# Patient Record
Sex: MALE | Race: WHITE | Employment: FULL TIME | URBAN - METROPOLITAN AREA
[De-identification: names, ages, dates, MRNs, and addresses within clinical notes are randomized per-mention and may not be internally consistent; named-entity substitution may affect disease eponyms.]

---

## 2017-01-04 ENCOUNTER — HOSPITAL ENCOUNTER (EMERGENCY)
Age: 32
Discharge: HOME | End: 2017-01-04
Attending: STUDENT IN AN ORGANIZED HEALTH CARE EDUCATION/TRAINING PROGRAM
Payer: COMMERCIAL

## 2017-01-04 ENCOUNTER — APPOINTMENT (OUTPATIENT)
Dept: GENERAL RADIOLOGY | Age: 32
End: 2017-01-04
Attending: EMERGENCY MEDICINE
Payer: COMMERCIAL

## 2017-01-04 VITALS
SYSTOLIC BLOOD PRESSURE: 113 MMHG | HEART RATE: 84 BPM | TEMPERATURE: 98 F | WEIGHT: 162.5 LBS | RESPIRATION RATE: 16 BRPM | OXYGEN SATURATION: 97 % | BODY MASS INDEX: 25.51 KG/M2 | HEIGHT: 67 IN | DIASTOLIC BLOOD PRESSURE: 76 MMHG

## 2017-01-04 DIAGNOSIS — R07.89 CHEST WALL PAIN: Primary | ICD-10-CM

## 2017-01-04 LAB
ALBUMIN SERPL BCP-MCNC: 4.2 G/DL (ref 3.5–5)
ALBUMIN/GLOB SERPL: 1.3 {RATIO} (ref 1.1–2.2)
ALP SERPL-CCNC: 100 U/L (ref 45–117)
ALT SERPL-CCNC: 64 U/L (ref 12–78)
ANION GAP BLD CALC-SCNC: 6 MMOL/L (ref 5–15)
AST SERPL W P-5'-P-CCNC: 23 U/L (ref 15–37)
ATRIAL RATE: 84 BPM
BASOPHILS # BLD AUTO: 0 K/UL (ref 0–0.1)
BASOPHILS # BLD: 0 % (ref 0–1)
BILIRUB SERPL-MCNC: 0.6 MG/DL (ref 0.2–1)
BUN SERPL-MCNC: 19 MG/DL (ref 6–20)
BUN/CREAT SERPL: 19 (ref 12–20)
CALCIUM SERPL-MCNC: 9 MG/DL (ref 8.5–10.1)
CALCULATED P AXIS, ECG09: 115 DEGREES
CALCULATED R AXIS, ECG10: 178 DEGREES
CALCULATED T AXIS, ECG11: 31 DEGREES
CHLORIDE SERPL-SCNC: 105 MMOL/L (ref 97–108)
CK MB CFR SERPL CALC: NORMAL % (ref 0–2.5)
CK MB SERPL-MCNC: <1 NG/ML (ref 5–25)
CK SERPL-CCNC: 94 U/L (ref 39–308)
CO2 SERPL-SCNC: 26 MMOL/L (ref 21–32)
CREAT SERPL-MCNC: 0.98 MG/DL (ref 0.7–1.3)
DIAGNOSIS, 93000: NORMAL
EOSINOPHIL # BLD: 0.1 K/UL (ref 0–0.4)
EOSINOPHIL NFR BLD: 1 % (ref 0–7)
ERYTHROCYTE [DISTWIDTH] IN BLOOD BY AUTOMATED COUNT: 13.8 % (ref 11.5–14.5)
GLOBULIN SER CALC-MCNC: 3.3 G/DL (ref 2–4)
GLUCOSE SERPL-MCNC: 83 MG/DL (ref 65–100)
HCT VFR BLD AUTO: 43.3 % (ref 36.6–50.3)
HGB BLD-MCNC: 14.7 G/DL (ref 12.1–17)
LYMPHOCYTES # BLD AUTO: 24 % (ref 12–49)
LYMPHOCYTES # BLD: 1.4 K/UL (ref 0.8–3.5)
MCH RBC QN AUTO: 31.6 PG (ref 26–34)
MCHC RBC AUTO-ENTMCNC: 33.9 G/DL (ref 30–36.5)
MCV RBC AUTO: 93.1 FL (ref 80–99)
MONOCYTES # BLD: 0.4 K/UL (ref 0–1)
MONOCYTES NFR BLD AUTO: 8 % (ref 5–13)
NEUTS SEG # BLD: 3.8 K/UL (ref 1.8–8)
NEUTS SEG NFR BLD AUTO: 67 % (ref 32–75)
P-R INTERVAL, ECG05: 196 MS
PLATELET # BLD AUTO: 167 K/UL (ref 150–400)
POTASSIUM SERPL-SCNC: 4.3 MMOL/L (ref 3.5–5.1)
PROT SERPL-MCNC: 7.5 G/DL (ref 6.4–8.2)
Q-T INTERVAL, ECG07: 390 MS
QRS DURATION, ECG06: 104 MS
QTC CALCULATION (BEZET), ECG08: 460 MS
RBC # BLD AUTO: 4.65 M/UL (ref 4.1–5.7)
SODIUM SERPL-SCNC: 137 MMOL/L (ref 136–145)
TROPONIN I SERPL-MCNC: <0.04 NG/ML
TROPONIN I SERPL-MCNC: <0.04 NG/ML
VENTRICULAR RATE, ECG03: 84 BPM
WBC # BLD AUTO: 5.7 K/UL (ref 4.1–11.1)

## 2017-01-04 PROCEDURE — 36415 COLL VENOUS BLD VENIPUNCTURE: CPT | Performed by: STUDENT IN AN ORGANIZED HEALTH CARE EDUCATION/TRAINING PROGRAM

## 2017-01-04 PROCEDURE — 71020 XR CHEST PA LAT: CPT

## 2017-01-04 PROCEDURE — 84484 ASSAY OF TROPONIN QUANT: CPT | Performed by: EMERGENCY MEDICINE

## 2017-01-04 PROCEDURE — 80053 COMPREHEN METABOLIC PANEL: CPT | Performed by: EMERGENCY MEDICINE

## 2017-01-04 PROCEDURE — 96372 THER/PROPH/DIAG INJ SC/IM: CPT

## 2017-01-04 PROCEDURE — 74011250637 HC RX REV CODE- 250/637: Performed by: STUDENT IN AN ORGANIZED HEALTH CARE EDUCATION/TRAINING PROGRAM

## 2017-01-04 PROCEDURE — 93005 ELECTROCARDIOGRAM TRACING: CPT

## 2017-01-04 PROCEDURE — 85025 COMPLETE CBC W/AUTO DIFF WBC: CPT | Performed by: EMERGENCY MEDICINE

## 2017-01-04 PROCEDURE — 82550 ASSAY OF CK (CPK): CPT | Performed by: EMERGENCY MEDICINE

## 2017-01-04 PROCEDURE — 99284 EMERGENCY DEPT VISIT MOD MDM: CPT

## 2017-01-04 PROCEDURE — 74011250636 HC RX REV CODE- 250/636: Performed by: STUDENT IN AN ORGANIZED HEALTH CARE EDUCATION/TRAINING PROGRAM

## 2017-01-04 RX ORDER — ONDANSETRON 4 MG/1
8 TABLET, ORALLY DISINTEGRATING ORAL
Status: COMPLETED | OUTPATIENT
Start: 2017-01-04 | End: 2017-01-04

## 2017-01-04 RX ORDER — ONDANSETRON 4 MG/1
4 TABLET, FILM COATED ORAL
Qty: 6 TAB | Refills: 0 | Status: SHIPPED | OUTPATIENT
Start: 2017-01-04 | End: 2017-01-06

## 2017-01-04 RX ORDER — METOPROLOL TARTRATE 100 MG/1
150 TABLET ORAL DAILY
COMMUNITY
End: 2017-01-04

## 2017-01-04 RX ORDER — METOPROLOL SUCCINATE 50 MG/1
50 TABLET, EXTENDED RELEASE ORAL DAILY
COMMUNITY
End: 2017-10-10 | Stop reason: SDUPTHER

## 2017-01-04 RX ORDER — PROCHLORPERAZINE EDISYLATE 5 MG/ML
10 INJECTION INTRAMUSCULAR; INTRAVENOUS
Status: COMPLETED | OUTPATIENT
Start: 2017-01-04 | End: 2017-01-04

## 2017-01-04 RX ORDER — KETOROLAC TROMETHAMINE 30 MG/ML
30 INJECTION, SOLUTION INTRAMUSCULAR; INTRAVENOUS
Status: COMPLETED | OUTPATIENT
Start: 2017-01-04 | End: 2017-01-04

## 2017-01-04 RX ORDER — ASPIRIN 325 MG
325 TABLET ORAL DAILY
COMMUNITY
End: 2017-09-25

## 2017-01-04 RX ORDER — TRAMADOL HYDROCHLORIDE 50 MG/1
50 TABLET ORAL
Status: COMPLETED | OUTPATIENT
Start: 2017-01-04 | End: 2017-01-04

## 2017-01-04 RX ORDER — KETOROLAC TROMETHAMINE 10 MG/1
10 TABLET, FILM COATED ORAL
Qty: 8 TAB | Refills: 0 | Status: SHIPPED | OUTPATIENT
Start: 2017-01-04 | End: 2017-01-06

## 2017-01-04 RX ADMIN — PROCHLORPERAZINE EDISYLATE 10 MG: 5 INJECTION INTRAMUSCULAR; INTRAVENOUS at 16:39

## 2017-01-04 RX ADMIN — KETOROLAC TROMETHAMINE 30 MG: 30 INJECTION, SOLUTION INTRAMUSCULAR at 16:39

## 2017-01-04 RX ADMIN — TRAMADOL HYDROCHLORIDE 50 MG: 50 TABLET, FILM COATED ORAL at 14:27

## 2017-01-04 RX ADMIN — ONDANSETRON 8 MG: 4 TABLET, ORALLY DISINTEGRATING ORAL at 14:27

## 2017-01-04 NOTE — ED NOTES
Pt ambulatory out of ED with discharge instructions and prescriptions in hand given by Dr. Tan Arce; pt verbalized understanding of discharge paperwork and time allotted for questions. VSS. Pt alert and oriented.

## 2017-01-04 NOTE — LETTER
[address] 
[address] [place] [id number] 
[phone] Work/School Note Date: 1/4/2017 To Whom It May concern: 
 
Esther Ford was seen and treated today in the emergency room by the following provider(s): 
Attending Provider: Luis Armando Sarabia MD. Esther Ford may return to work on 1-5-17.  
 
Sincerely, 
 
 
 
 
Marcella Lee RN

## 2017-01-04 NOTE — ED TRIAGE NOTES
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

## 2017-01-04 NOTE — PROGRESS NOTES
Prior to Admission Medications   Prescriptions Last Dose Informant Patient Reported? Taking?   allopurinol (ZYLOPRIM) 300 mg tablet   Yes Yes   Sig: Take 300 mg by mouth daily. aspirin (ASPIRIN) 325 mg tablet   Yes Yes   Sig: Take 325 mg by mouth daily. metoprolol succinate (TOPROL-XL) 50 mg XL tablet   Yes Yes   Sig: Take 100 mg by mouth daily. sacubitril-valsartan (ENTRESTO) 49 mg/51 mg tablet   Yes Yes   Sig: Take 1 Tab by mouth two (2) times a day. Facility-Administered Medications: None   Pt reveals current PTA meds at [facility] interview. Removed Adderall as pt states he no longer takes. Spoke with [facility] to obtain current dosing on Metoprolol & Entresto & thus documented.  Added Aspirin per pt

## 2017-01-04 NOTE — ED PROVIDER NOTES
HPI Comments: 32 y.o. male with past medical history significant for Pacemaker, gout, stroke, and asthma who presents with chief complaint of CP. The pt c/o constant CP and pressure for the past 4 hours that radiates to the back and R arm with associated nausea and vomiting. The pt reports that he took Nitro about 2 hours ago with no relief. The pt reports that he was told that his pacemaker \"wasn't pacing\" and was \"dislodged\" when he was hospitalized 2 weeks ago. The pt says that there was no interrogation by [employer] at that time and says that he has an appointment with cardio in 11 days to discuss future options. There are no other acute medical concerns at this time. Old Chart Review: The pt was seen in the ED on 12/12/2016, 12/15/2016, and 12/27/2016 for worsening right-sided CP. PCP: Deion Graf MD  Cardiology: Coy Valle MD    Note written by Fanny Pink, as dictated by Billy Garcia MD 1:50 PM      The history is provided by the patient. No  was used. Past Medical History:   Diagnosis Date    Asthma     Gout     Pacemaker     Stroke [facility])     Transposition great arteries        Past Surgical History:   Procedure Laterality Date    Hx pacemaker           History reviewed. No pertinent family history. Social History     Social History    Marital status:      Spouse name: N/A    Number of children: N/A    Years of education: N/A     Occupational History    Not on file. Social History Main Topics    Smoking status: Never Smoker    Smokeless tobacco: Not on file    Alcohol use Yes    Drug use: Yes     Special: Marijuana    Sexual activity: Not on file     Other Topics Concern    Not on file     Social History Narrative         ALLERGIES: Betadine [povidone-iodine]    Review of Systems   Constitutional: Negative for chills and fatigue.    HENT: Negative for congestion, rhinorrhea, sinus pressure, sore throat, trouble swallowing and voice change. Eyes: Negative for photophobia and visual disturbance. Respiratory: Negative for chest tightness. Cardiovascular: Positive for chest pain (pressure). Negative for leg swelling. Gastrointestinal: Positive for nausea and vomiting. Negative for blood in stool, constipation and diarrhea. Musculoskeletal: Positive for arthralgias (R arm pain) and back pain. Negative for myalgias and neck pain. Neurological: Negative for light-headedness. All other systems reviewed and are negative. Vitals:    01/04/17 1144   BP: 118/77   Pulse: 86   Resp: 16   Temp: 97.8 °F (36.6 °C)   SpO2: 99%   Weight: 73.7 kg (162 lb 8 oz)   Height: 5' 7\" (1.702 m)            Physical Exam   Constitutional: He is oriented to person, place, and time. He appears well-developed and well-nourished. He appears distressed (moderate secondary to pain). HENT:   Head: Normocephalic and atraumatic. Nose: Nose normal.   Mouth/Throat: Oropharynx is clear and moist. No oropharyngeal exudate. Eyes: Conjunctivae and EOM are normal. Right eye exhibits no discharge. Left eye exhibits no discharge. No scleral icterus. Neck: Normal range of motion. Neck supple. No JVD present. No tracheal deviation present. No thyromegaly present. Cardiovascular: Normal rate, regular rhythm, normal heart sounds and intact distal pulses. Exam reveals no gallop and no friction rub. No murmur heard. Pulmonary/Chest: Effort normal and breath sounds normal. No stridor. No respiratory distress. He has no wheezes. He has no rales. He exhibits no tenderness. Abdominal: Bowel sounds are normal. He exhibits no distension and no mass. There is no tenderness. There is no rebound. Musculoskeletal: Normal range of motion. He exhibits no edema or tenderness. Lymphadenopathy:     He has no cervical adenopathy. Neurological: He is alert and oriented to person, place, and time. No cranial nerve deficit.  Coordination normal.   Skin: Skin is warm and dry. No rash noted. He is not diaphoretic. No erythema. No pallor. Psychiatric: He has a normal mood and affect. His behavior is normal. Judgment and thought content normal.      Note written by Fanny Schneider, as dictated by Donna Hawkins MD 1:50 PM  MDM  Number of Diagnoses or Management Options  Chest wall pain:   Diagnosis management comments: Chest wall pain, narcotic seeking behavior, ACS. 33 y/o with multiple visits for chest pain at various EDs. Concern for narcotic seeking behavior verified by . Discussed case with Dr. Gilmer Godwin at [facility] who also confirms pt has mult complaints for pain. Will eval for ACS with cbc, cmp, ce, ck, CXR, toradol, compazine for nausea. Amount and/or Complexity of Data Reviewed  Clinical lab tests: ordered and reviewed  Tests in the radiology section of CPT®: reviewed and ordered  Review and summarize past medical records: yes  Discuss the patient with other providers: yes    Risk of Complications, Morbidity, and/or Mortality  Presenting problems: moderate  Diagnostic procedures: moderate  Management options: moderate    Patient Progress  Patient progress: stable    ED Course       Procedures    PROGRESS NOTE:  4:05 PM I spoke with one of Dr. Madeline Jauregui at [address] [place] [address] and scheduled an appointment with Dr. Gilmer Godwin tomorrow at 2 PM for the pt. PROGRESS NOTE:  4:24 PM [facility] called to state that the pt has been seen at [facility] with extensive cardiac hx involving transposition of great vessels. Pt is currently trying to be put on heart transplant list. He is currently being followed by Dr. Gilmer Godwin at [facility]. CONSULT NOTE:  4:26 PM Donna Hawkins MD spoke with Dr. Gilmer Godwin, Consult for Cardiology at [facility]. Discussed available diagnostic tests and clinical findings. He is in agreement with care plans as outlined. Dr. Gilmer Godwin explains that the pt frequents multiple ED's from various groups seeking pain medication.  Pt is not on a transplant list, has not been on a transplant list in the past, and is not being referred to a transplant list because he is stable. Dr. Graeme Hinojosa said that he previously had a conversation with the pt about his pain. The pt knows how to get into contact and can set up an appointment with Dr. Graeme Hinojosa at any time. The pt was just seen by Dr. Graeme Hinojosa 2 weeks ago.

## 2017-01-04 NOTE — DISCHARGE INSTRUCTIONS
We hope that we have addressed all of your medical concerns. The examination and treatment you received in the Emergency Department were for an emergent problem and were not intended as complete care. It is important that you follow up with your healthcare provider(s) for ongoing care. If your symptoms worsen or do not improve as expected, and you are unable to reach your usual health care provider(s), you should return to the Emergency Department. Today's healthcare is undergoing tremendous change, and patient satisfaction surveys are one of the many tools to assess the quality of medical care. You may receive a survey from the "[employer] regarding your experience in the Emergency Department. I hope that your experience has been completely positive, particularly the medical care that I provided. As such, please participate in the survey; anything less than excellent does not meet my expectations or intentions. [facility] and [address] [place] [address] participate in nationally recognized quality of care measures. If your blood pressure is greater than 120/80, as reported below, we urge that you seek medical care to address the potential of high blood pressure, commonly known as hypertension. Hypertension can be hereditary or can be caused by certain medical conditions, pain, stress, or \"white coat syndrome. \"       Please make an appointment with your health care provider(s) for follow up of your Emergency Department visit. VITALS:   Patient Vitals for the past 8 hrs:   Temp Pulse Resp BP SpO2   01/04/17 1455 98.2 °F (36.8 °C) 87 16 121/75 100 %   01/04/17 1144 97.8 °F (36.6 °C) 86 16 118/77 99 %          Thank you for allowing us to provide you with medical care today. We realize that you have many choices for your emergency care needs. Please choose us in the future for any continued health care needs. Vanesa Huitron [place] Doyle, [facility].   Office: [phone]            Recent Results (from the past 24 hour(s))   EKG, 12 LEAD, INITIAL    Collection Time: 01/04/17 12:03 PM   Result Value Ref Range    Ventricular Rate 84 BPM    Atrial Rate 84 BPM    P-R Interval 196 ms    QRS Duration 104 ms    Q-T Interval 390 ms    QTC Calculation (Bezet) 460 ms    Calculated P Axis 115 degrees    Calculated R Axis 178 degrees    Calculated T Axis 31 degrees    Diagnosis       ** Suspect arm lead reversal, interpretation assumes no reversal  Atrial-paced rhythm  When compared with ECG of 27-DEC-2016 20:29,  No significant change was found     CBC WITH AUTOMATED DIFF    Collection Time: 01/04/17 12:37 PM   Result Value Ref Range    WBC 5.7 4.1 - 11.1 K/uL    RBC 4.65 4.10 - 5.70 M/uL    HGB 14.7 12.1 - 17.0 g/dL    HCT 43.3 36.6 - 50.3 %    MCV 93.1 80.0 - 99.0 FL    MCH 31.6 26.0 - 34.0 PG    MCHC 33.9 30.0 - 36.5 g/dL    RDW 13.8 11.5 - 14.5 %    PLATELET 694 628 - 917 K/uL    NEUTROPHILS 67 32 - 75 %    LYMPHOCYTES 24 12 - 49 %    MONOCYTES 8 5 - 13 %    EOSINOPHILS 1 0 - 7 %    BASOPHILS 0 0 - 1 %    ABS. NEUTROPHILS 3.8 1.8 - 8.0 K/UL    ABS. LYMPHOCYTES 1.4 0.8 - 3.5 K/UL    ABS. MONOCYTES 0.4 0.0 - 1.0 K/UL    ABS. EOSINOPHILS 0.1 0.0 - 0.4 K/UL    ABS. BASOPHILS 0.0 0.0 - 0.1 K/UL   METABOLIC PANEL, COMPREHENSIVE    Collection Time: 01/04/17 12:37 PM   Result Value Ref Range    Sodium 137 136 - 145 mmol/L    Potassium 4.3 3.5 - 5.1 mmol/L    Chloride 105 97 - 108 mmol/L    CO2 26 21 - 32 mmol/L    Anion gap 6 5 - 15 mmol/L    Glucose 83 65 - 100 mg/dL    BUN 19 6 - 20 MG/DL    Creatinine 0.98 0.70 - 1.30 MG/DL    BUN/Creatinine ratio 19 12 - 20      GFR est AA >60 >60 ml/min/1.73m2    GFR est non-AA >60 >60 ml/min/1.73m2    Calcium 9.0 8.5 - 10.1 MG/DL    Bilirubin, total 0.6 0.2 - 1.0 MG/DL    ALT 64 12 - 78 U/L    AST 23 15 - 37 U/L    Alk.  phosphatase 100 45 - 117 U/L    Protein, total 7.5 6.4 - 8.2 g/dL Albumin 4.2 3.5 - 5.0 g/dL    Globulin 3.3 2.0 - 4.0 g/dL    A-G Ratio 1.3 1.1 - 2.2     TROPONIN I    Collection Time: 01/04/17 12:37 PM   Result Value Ref Range    Troponin-I, Qt. <0.04 <0.05 ng/mL   CK W/ CKMB & INDEX    Collection Time: 01/04/17 12:37 PM   Result Value Ref Range    CK 94 39 - 308 U/L    CK - MB <1.0 <3.6 NG/ML    CK-MB Index CANNOT BE CALCULATED 0 - 2.5     TROPONIN I    Collection Time: 01/04/17  3:49 PM   Result Value Ref Range    Troponin-I, Qt. <0.04 <0.05 ng/mL       Xr Chest Pa Lat    Result Date: 1/4/2017  Exam:  2 view chest Indication: Chest and right arm pain. COMPARISON: Numerous chest radiographs most recent of 12/27/2016 PA and lateral views demonstrate normal heart size. The right-sided pacemaker remains with its unusual course the tip projects to the left of the midline superiorly on lateral exam this is posteriorly located. This could be within the left atrium. Clinical correlation is suggested. This case was discussed with Dr. Anton Cobb. The lungs are clear. There is pectus excavatum. IMPRESSION: 1. Clinical correlation for the site of the pacemaker is suggested. 2. The lungs are clear           Chest Pain: Care Instructions  Your Care Instructions  There are many things that can cause chest pain. Some are not serious and will get better on their own in a few days. But some kinds of chest pain need more testing and treatment. Your doctor may have recommended a follow-up visit in the next 8 to 12 hours. If you are not getting better, you may need more tests or treatment. Even though your doctor has released you, you still need to watch for any problems. The doctor carefully checked you, but sometimes problems can develop later. If you have new symptoms or if your symptoms do not get better, get medical care right away.   If you have worse or different chest pain or pressure that lasts more than 5 minutes or you passed out (lost consciousness), call 911 or seek other emergency help right away. A medical visit is only one step in your treatment. Even if you feel better, you still need to do what your doctor recommends, such as going to all suggested follow-up appointments and taking medicines exactly as directed. This will help you recover and help prevent future problems. How can you care for yourself at home? · Rest until you feel better. · Take your medicine exactly as prescribed. Call your doctor if you think you are having a problem with your medicine. · Do not drive after taking a prescription pain medicine. When should you call for help? Call 911 if:  · You passed out (lost consciousness). · You have severe difficulty breathing. · You have symptoms of a heart attack. These may include:  ¨ Chest pain or pressure, or a strange feeling in your chest.  ¨ Sweating. ¨ Shortness of breath. ¨ Nausea or vomiting. ¨ Pain, pressure, or a strange feeling in your back, neck, jaw, or upper belly or in one or both shoulders or arms. ¨ Lightheadedness or sudden weakness. ¨ A fast or irregular heartbeat. After you call 911, the  may tell you to chew 1 adult-strength or 2 to 4 low-dose aspirin. Wait for an ambulance. Do not try to drive yourself. Call your doctor today if:  · You have any trouble breathing. · Your chest pain gets worse. · You are dizzy or lightheaded, or you feel like you may faint. · You are not getting better as expected. · You are having new or different chest pain. Where can you learn more? Go to http://mj-niels.info/. Enter A120 in the search box to learn more about \"Chest Pain: Care Instructions. \"  Current as of: May 27, 2016  Content Version: 11.1  © 5912-7747 [employer]. Care instructions adapted under license by [employer] (which disclaims liability or warranty for this information).  If you have questions about a medical condition or this instruction, always ask your healthcare professional. [address] any warranty or liability for your use of this information.

## 2017-01-26 ENCOUNTER — APPOINTMENT (OUTPATIENT)
Dept: GENERAL RADIOLOGY | Age: 32
End: 2017-01-26
Attending: EMERGENCY MEDICINE
Payer: COMMERCIAL

## 2017-01-26 ENCOUNTER — HOSPITAL ENCOUNTER (EMERGENCY)
Age: 32
Discharge: HOME | End: 2017-01-27
Attending: EMERGENCY MEDICINE | Admitting: EMERGENCY MEDICINE
Payer: COMMERCIAL

## 2017-01-26 VITALS
TEMPERATURE: 98.3 F | WEIGHT: 163.14 LBS | SYSTOLIC BLOOD PRESSURE: 128 MMHG | DIASTOLIC BLOOD PRESSURE: 80 MMHG | HEIGHT: 67 IN | OXYGEN SATURATION: 97 % | BODY MASS INDEX: 25.61 KG/M2 | HEART RATE: 90 BPM | RESPIRATION RATE: 16 BRPM

## 2017-01-26 DIAGNOSIS — R07.89 ATYPICAL CHEST PAIN: Primary | ICD-10-CM

## 2017-01-26 DIAGNOSIS — Z76.5 DRUG-SEEKING BEHAVIOR: ICD-10-CM

## 2017-01-26 LAB
ALBUMIN SERPL BCP-MCNC: 4.4 G/DL (ref 3.5–5)
ALBUMIN/GLOB SERPL: 1.2 {RATIO} (ref 1.1–2.2)
ALP SERPL-CCNC: 88 U/L (ref 45–117)
ALT SERPL-CCNC: 29 U/L (ref 12–78)
ANION GAP BLD CALC-SCNC: 8 MMOL/L (ref 5–15)
AST SERPL W P-5'-P-CCNC: 21 U/L (ref 15–37)
BASOPHILS # BLD AUTO: 0 K/UL (ref 0–0.1)
BASOPHILS # BLD: 0 % (ref 0–1)
BILIRUB SERPL-MCNC: 0.6 MG/DL (ref 0.2–1)
BUN SERPL-MCNC: 14 MG/DL (ref 6–20)
BUN/CREAT SERPL: 13 (ref 12–20)
CALCIUM SERPL-MCNC: 8.7 MG/DL (ref 8.5–10.1)
CHLORIDE SERPL-SCNC: 104 MMOL/L (ref 97–108)
CK SERPL-CCNC: 139 U/L (ref 39–308)
CO2 SERPL-SCNC: 28 MMOL/L (ref 21–32)
CREAT SERPL-MCNC: 1.04 MG/DL (ref 0.7–1.3)
D DIMER PPP FEU-MCNC: 0.19 MG/L FEU (ref 0–0.65)
EOSINOPHIL # BLD: 0.1 K/UL (ref 0–0.4)
EOSINOPHIL NFR BLD: 2 % (ref 0–7)
ERYTHROCYTE [DISTWIDTH] IN BLOOD BY AUTOMATED COUNT: 13.2 % (ref 11.5–14.5)
GLOBULIN SER CALC-MCNC: 3.7 G/DL (ref 2–4)
GLUCOSE SERPL-MCNC: 99 MG/DL (ref 65–100)
HCT VFR BLD AUTO: 43.5 % (ref 36.6–50.3)
HGB BLD-MCNC: 15 G/DL (ref 12.1–17)
LYMPHOCYTES # BLD AUTO: 33 % (ref 12–49)
LYMPHOCYTES # BLD: 1.8 K/UL (ref 0.8–3.5)
MCH RBC QN AUTO: 31.7 PG (ref 26–34)
MCHC RBC AUTO-ENTMCNC: 34.5 G/DL (ref 30–36.5)
MCV RBC AUTO: 92 FL (ref 80–99)
MONOCYTES # BLD: 0.5 K/UL (ref 0–1)
MONOCYTES NFR BLD AUTO: 9 % (ref 5–13)
NEUTS SEG # BLD: 3.1 K/UL (ref 1.8–8)
NEUTS SEG NFR BLD AUTO: 56 % (ref 32–75)
PLATELET # BLD AUTO: 144 K/UL (ref 150–400)
POTASSIUM SERPL-SCNC: 3.6 MMOL/L (ref 3.5–5.1)
PROT SERPL-MCNC: 8.1 G/DL (ref 6.4–8.2)
RBC # BLD AUTO: 4.73 M/UL (ref 4.1–5.7)
SODIUM SERPL-SCNC: 140 MMOL/L (ref 136–145)
TROPONIN I SERPL-MCNC: <0.04 NG/ML
WBC # BLD AUTO: 5.5 K/UL (ref 4.1–11.1)

## 2017-01-26 PROCEDURE — 96374 THER/PROPH/DIAG INJ IV PUSH: CPT

## 2017-01-26 PROCEDURE — 74011250636 HC RX REV CODE- 250/636: Performed by: EMERGENCY MEDICINE

## 2017-01-26 PROCEDURE — 99285 EMERGENCY DEPT VISIT HI MDM: CPT

## 2017-01-26 PROCEDURE — 85379 FIBRIN DEGRADATION QUANT: CPT | Performed by: EMERGENCY MEDICINE

## 2017-01-26 PROCEDURE — 71020 XR CHEST PA LAT: CPT

## 2017-01-26 PROCEDURE — 93005 ELECTROCARDIOGRAM TRACING: CPT

## 2017-01-26 PROCEDURE — 80053 COMPREHEN METABOLIC PANEL: CPT | Performed by: EMERGENCY MEDICINE

## 2017-01-26 PROCEDURE — 82550 ASSAY OF CK (CPK): CPT | Performed by: EMERGENCY MEDICINE

## 2017-01-26 PROCEDURE — 36415 COLL VENOUS BLD VENIPUNCTURE: CPT | Performed by: EMERGENCY MEDICINE

## 2017-01-26 PROCEDURE — 96375 TX/PRO/DX INJ NEW DRUG ADDON: CPT

## 2017-01-26 PROCEDURE — 74011250637 HC RX REV CODE- 250/637: Performed by: EMERGENCY MEDICINE

## 2017-01-26 PROCEDURE — 96361 HYDRATE IV INFUSION ADD-ON: CPT

## 2017-01-26 PROCEDURE — 85025 COMPLETE CBC W/AUTO DIFF WBC: CPT | Performed by: EMERGENCY MEDICINE

## 2017-01-26 PROCEDURE — 84484 ASSAY OF TROPONIN QUANT: CPT | Performed by: EMERGENCY MEDICINE

## 2017-01-26 RX ORDER — MORPHINE SULFATE 2 MG/ML
6 INJECTION, SOLUTION INTRAMUSCULAR; INTRAVENOUS
Status: COMPLETED | OUTPATIENT
Start: 2017-01-26 | End: 2017-01-26

## 2017-01-26 RX ORDER — KETOROLAC TROMETHAMINE 30 MG/ML
15 INJECTION, SOLUTION INTRAMUSCULAR; INTRAVENOUS
Status: COMPLETED | OUTPATIENT
Start: 2017-01-26 | End: 2017-01-26

## 2017-01-26 RX ORDER — OXYCODONE AND ACETAMINOPHEN 5; 325 MG/1; MG/1
2 TABLET ORAL
Status: COMPLETED | OUTPATIENT
Start: 2017-01-26 | End: 2017-01-26

## 2017-01-26 RX ORDER — SODIUM CHLORIDE 9 MG/ML
500 INJECTION, SOLUTION INTRAVENOUS ONCE
Status: COMPLETED | OUTPATIENT
Start: 2017-01-26 | End: 2017-01-26

## 2017-01-26 RX ORDER — ONDANSETRON 2 MG/ML
4 INJECTION INTRAMUSCULAR; INTRAVENOUS
Status: COMPLETED | OUTPATIENT
Start: 2017-01-26 | End: 2017-01-26

## 2017-01-26 RX ADMIN — OXYCODONE HYDROCHLORIDE AND ACETAMINOPHEN 2 TABLET: 5; 325 TABLET ORAL at 23:55

## 2017-01-26 RX ADMIN — SODIUM CHLORIDE 500 ML: 900 INJECTION, SOLUTION INTRAVENOUS at 22:59

## 2017-01-26 RX ADMIN — Medication 6 MG: at 22:15

## 2017-01-26 RX ADMIN — KETOROLAC TROMETHAMINE 15 MG: 30 INJECTION, SOLUTION INTRAMUSCULAR at 23:55

## 2017-01-26 RX ADMIN — ONDANSETRON 4 MG: 2 INJECTION INTRAMUSCULAR; INTRAVENOUS at 22:15

## 2017-01-27 LAB
ATRIAL RATE: 85 BPM
ATRIAL RATE: 98 BPM
CALCULATED R AXIS, ECG10: -164 DEGREES
CALCULATED R AXIS, ECG10: 152 DEGREES
CALCULATED T AXIS, ECG11: 33 DEGREES
CALCULATED T AXIS, ECG11: 9 DEGREES
DIAGNOSIS, 93000: NORMAL
DIAGNOSIS, 93000: NORMAL
P-R INTERVAL, ECG05: 128 MS
Q-T INTERVAL, ECG07: 366 MS
Q-T INTERVAL, ECG07: 414 MS
QRS DURATION, ECG06: 116 MS
QRS DURATION, ECG06: 116 MS
QTC CALCULATION (BEZET), ECG08: 464 MS
QTC CALCULATION (BEZET), ECG08: 492 MS
TROPONIN I SERPL-MCNC: <0.04 NG/ML
VENTRICULAR RATE, ECG03: 85 BPM
VENTRICULAR RATE, ECG03: 97 BPM

## 2017-01-27 PROCEDURE — 36415 COLL VENOUS BLD VENIPUNCTURE: CPT | Performed by: EMERGENCY MEDICINE

## 2017-01-27 PROCEDURE — 74011250637 HC RX REV CODE- 250/637: Performed by: EMERGENCY MEDICINE

## 2017-01-27 PROCEDURE — 84484 ASSAY OF TROPONIN QUANT: CPT | Performed by: EMERGENCY MEDICINE

## 2017-01-27 PROCEDURE — 93005 ELECTROCARDIOGRAM TRACING: CPT

## 2017-01-27 RX ORDER — DIAZEPAM 5 MG/1
2.5 TABLET ORAL
Qty: 10 TAB | Refills: 0 | Status: SHIPPED | OUTPATIENT
Start: 2017-01-27 | End: 2017-09-25

## 2017-01-27 RX ORDER — LORAZEPAM 1 MG/1
1 TABLET ORAL
Status: COMPLETED | OUTPATIENT
Start: 2017-01-27 | End: 2017-01-27

## 2017-01-27 RX ORDER — NAPROXEN 250 MG/1
250 TABLET ORAL 2 TIMES DAILY WITH MEALS
Qty: 14 TAB | Refills: 0 | Status: SHIPPED | OUTPATIENT
Start: 2017-01-27 | End: 2017-02-03

## 2017-01-27 RX ADMIN — LORAZEPAM 1 MG: 1 TABLET ORAL at 01:42

## 2017-01-27 NOTE — ED PROVIDER NOTES
HPI Comments: Azael Owen is a 32 y.o. male with PMHx of transposition of great arteries, asthma, CAD, and CHF, and PSHx of pacemaker placement, presenting ambulatory to ED c/o 10/10 right-sided pressure/sharp CP radiating down his right arm since one hour PTA. Pt notes pain is worse with deep breathing. He reports associated N/V and SOB. Pt endorses bending over to  one of his children and feeling something move at his pacemaker site prior to CP onset. He reports history of left-sided CP at baseline (rated 5-6) s/p 1985 Mustard procedure and intermittent 9-10/10 CP episodes, but not on the right side. He reports being followed by cardiologist at [facility] cardiology and recent referral to [facility] cardiology, with appointment scheduled on February 21, 2017. Pt endorses history of pacemaker placement, noting his paced HR is 85 at baseline. He denies recent fall or recent illness. Pt specifically denies abdominal pain, hemoptysis, leg swelling, recent long travel, fever, or new cough. PCP: PROVIDER UNKNOWN  Social Hx: never smoker; + EtOH; + drug use (marijuana)    There are no other complaints, changes, or physical findings at this time. Written by ELIJAH Cabrales, as dictated by Nelly Weinberg MD.          The history is provided by the patient. Past Medical History:   Diagnosis Date    Asthma     CAD (coronary artery disease)     Gout     Heart failure ([facility] [address]      with pacemaker, states transition of great vessels    Pacemaker     Stroke [facility])     Transposition great arteries        Past Surgical History:   Procedure Laterality Date    Hx pacemaker           History reviewed. No pertinent family history. Social History     Social History    Marital status:      Spouse name: N/A    Number of children: N/A    Years of education: N/A     Occupational History    Not on file.      Social History Main Topics    Smoking status: Never Smoker    Smokeless tobacco: Not on file    Alcohol use Yes    Drug use: Yes     Special: Marijuana    Sexual activity: Not on file     Other Topics Concern    Not on file     Social History Narrative         ALLERGIES: Betadine [povidone-iodine]    Review of Systems   Constitutional: Negative for chills and fever. HENT: Negative for congestion. Eyes: Negative for visual disturbance. Respiratory: Positive for shortness of breath. Negative for chest tightness. Denies hemoptysis   Cardiovascular: Positive for chest pain (right-sided with radiation down right arm). Negative for leg swelling. Gastrointestinal: Positive for nausea and vomiting. Negative for abdominal pain. Endocrine: Negative for polyuria. Genitourinary: Negative for dysuria and frequency. Musculoskeletal: Negative for myalgias. Skin: Negative for color change. Allergic/Immunologic: Negative for immunocompromised state. Neurological: Negative for numbness. All other systems reviewed and are negative. Vitals:    01/26/17 2015 01/26/17 2043 01/26/17 2106 01/26/17 2205   BP: 128/80      Pulse: (!) 103 96 97 90   Resp: 18 12 14 16   Temp: 98.3 °F (36.8 °C)      SpO2: 99% 99% 98% 97%   Weight: 74 kg (163 lb 2.3 oz)      Height: 5' 7\" (1.702 m)               Physical Exam     Nursing note and vitals reviewed.   General appearance: non-toxic, NAD  Eyes: PERRL, EOMI, conjunctiva normal, anicteric sclera  HENT: mucous membranes moist, oropharynx is clear  Pulmonary: clear to auscultation bilaterally  Cardiac: tachycardic, no murmurs, gallops, or rubs, 2+ peripheral pulses,pectus excavatum, TTP over right chest wall adjacent to pacemaker scar, CR is brisk, no splinter hemorrhage, no osler nodes  Abdomen: soft, nontender, nondistended, bowel sounds present  MSK: no lower extremity edema, subungual hematoma to left thumb  Neuro: Alert, answers questions appropriately    MDM  Number of Diagnoses or Management Options  Atypical chest pain:   Diagnosis management comments: DDx: musculoskeletal CP, pleurisy, PE, pacer lead malposition. Amount and/or Complexity of Data Reviewed  Clinical lab tests: reviewed and ordered  Tests in the radiology section of CPT®: ordered and reviewed  Tests in the medicine section of CPT®: reviewed and ordered  Review and summarize past medical records: yes  Discuss the patient with other providers: yes (Cardiology)  Independent visualization of images, tracings, or specimens: yes    Patient Progress  Patient progress: stable       Procedures    EKG interpretation: (2018)  Rhythm: atrial paced. Rate (approx.): 97; Axis: normal; QRS interval: normal (116); QT/QTc: 366/464; ST/T wave: no STEPAN/STD; Other findings: nonspecific intraventricular delay. Written by ELIJAH Mascorro, as dictated by Casandra Butler MD.     EKG interpretation: (0120)  Rhythm: normal sinus rhythm and left posterior fascicular block. Rate (approx.): 85; Axis: normal; WI interval: normal (128); QRS interval: normal (116); ST/T wave: no STEPAN/STD, nonspecific changes; Other findings: unchanged from prior EKG. Written by ELIJAH Mascorro, as dictated by Casandra Butler MD.     CONSULT NOTE:   2:05 AM  Casandra Butler MD spoke with Dr. Lian Dickerson [facility] cardiology),   Specialty: Cardiology  Discussed pt's hx, disposition, and available diagnostic and imaging results. Reviewed care plans. Consultant agrees with plans as outlined. Pt had normal cath last year; EKG similar to prior; multiple negative D dimers with low suspicion for PE; multiple ED visits for CP with negative work ups. Recommends d/c, pt may be seen in [facility] cardiology clinic early next week.     Written by ELIJAH Mascorro, as dictated by Casandra Butler MD.    LABORATORY TESTS:  Recent Results (from the past 12 hour(s))   EKG, 12 LEAD, INITIAL    Collection Time: 01/26/17  8:18 PM   Result Value Ref Range    Ventricular Rate 97 BPM    Atrial Rate 98 BPM    QRS Duration 116 ms    Q-T Interval 366 ms    QTC Calculation (Bezet) 464 ms    Calculated R Axis -164 degrees    Calculated T Axis 33 degrees    Diagnosis       Accelerated Junctional rhythm  Right bundle branch block , plus right ventricular hypertrophy  Possible Inferior infarct , age undetermined  T wave abnormality, consider lateral ischemia  Abnormal ECG  When compared with ECG of 04-JAN-2017 12:03,  Junctional rhythm has replaced Electronic atrial pacemaker  Right bundle branch block is now present     CBC WITH AUTOMATED DIFF    Collection Time: 01/26/17  8:39 PM   Result Value Ref Range    WBC 5.5 4.1 - 11.1 K/uL    RBC 4.73 4.10 - 5.70 M/uL    HGB 15.0 12.1 - 17.0 g/dL    HCT 43.5 36.6 - 50.3 %    MCV 92.0 80.0 - 99.0 FL    MCH 31.7 26.0 - 34.0 PG    MCHC 34.5 30.0 - 36.5 g/dL    RDW 13.2 11.5 - 14.5 %    PLATELET 254 (L) 996 - 400 K/uL    NEUTROPHILS 56 32 - 75 %    LYMPHOCYTES 33 12 - 49 %    MONOCYTES 9 5 - 13 %    EOSINOPHILS 2 0 - 7 %    BASOPHILS 0 0 - 1 %    ABS. NEUTROPHILS 3.1 1.8 - 8.0 K/UL    ABS. LYMPHOCYTES 1.8 0.8 - 3.5 K/UL    ABS. MONOCYTES 0.5 0.0 - 1.0 K/UL    ABS. EOSINOPHILS 0.1 0.0 - 0.4 K/UL    ABS. BASOPHILS 0.0 0.0 - 0.1 K/UL   METABOLIC PANEL, COMPREHENSIVE    Collection Time: 01/26/17  8:39 PM   Result Value Ref Range    Sodium 140 136 - 145 mmol/L    Potassium 3.6 3.5 - 5.1 mmol/L    Chloride 104 97 - 108 mmol/L    CO2 28 21 - 32 mmol/L    Anion gap 8 5 - 15 mmol/L    Glucose 99 65 - 100 mg/dL    BUN 14 6 - 20 MG/DL    Creatinine 1.04 0.70 - 1.30 MG/DL    BUN/Creatinine ratio 13 12 - 20      GFR est AA >60 >60 ml/min/1.73m2    GFR est non-AA >60 >60 ml/min/1.73m2    Calcium 8.7 8.5 - 10.1 MG/DL    Bilirubin, total 0.6 0.2 - 1.0 MG/DL    ALT 29 12 - 78 U/L    AST 21 15 - 37 U/L    Alk.  phosphatase 88 45 - 117 U/L    Protein, total 8.1 6.4 - 8.2 g/dL    Albumin 4.4 3.5 - 5.0 g/dL    Globulin 3.7 2.0 - 4.0 g/dL    A-G Ratio 1.2 1.1 - 2.2     TROPONIN I    Collection Time: 01/26/17  8:39 PM   Result Value Ref Range    Troponin-I, Qt. <0.04 <0.05 ng/mL   CK W/ REFLX CKMB    Collection Time: 01/26/17  8:39 PM   Result Value Ref Range     39 - 308 U/L   D DIMER    Collection Time: 01/26/17 10:45 PM   Result Value Ref Range    D-dimer 0.19 0.00 - 0.65 mg/L FEU   TROPONIN I    Collection Time: 01/27/17  1:08 AM   Result Value Ref Range    Troponin-I, Qt. <0.04 <0.05 ng/mL       IMAGING RESULTS:  INDICATION: right side CP onset this morning, pt reports \"significant cardiac  hx\" feels \"like pacemaker moved out of place\" this morning     COMPARISON: January 4, 2017     FINDINGS:     Frontal and lateral views of the chest demonstrate unchanged right subclavian  single lead pacemaker. The lead is unchanged in position over the superior  margin of the cardiac silhouette since November 2016. Heart size within normal  limits. The lungs are adequately expanded. There is no edema, effusion,  consolidation, or pneumothorax. The osseous structures are unremarkable.     IMPRESSION  IMPRESSION:  No acute process or significant interval change. Single lead of right subclavian  pacemaker in the region of the left atrium is unchanged since November 2016. Correlation with intended location of the leads/prior films is recommended.             MEDICATIONS GIVEN:  Medications   morphine injection 6 mg (6 mg IntraVENous Given 1/26/17 2215)   ondansetron (ZOFRAN) injection 4 mg (4 mg IntraVENous Given 1/26/17 2215)   0.9% sodium chloride infusion 500 mL (0 mL IntraVENous IV Completed 1/26/17 2355)   ketorolac (TORADOL) injection 15 mg (15 mg IntraVENous Given 1/26/17 2355)   oxyCODONE-acetaminophen (PERCOCET) 5-325 mg per tablet 2 Tab (2 Tabs Oral Given 1/26/17 2355)   LORazepam (ATIVAN) tablet 1 mg (1 mg Oral Given 1/27/17 0142)       IMPRESSION:  1. Atypical chest pain        PLAN:  1.    Current Discharge Medication List      START taking these medications    Details   diazePAM (VALIUM) 5 mg tablet Take 0.5 Tabs by mouth three (3) times daily as needed for Anxiety (or muscle spasm). Max Daily Amount: 7.5 mg. Indications: MUSCLE SPASM, causes drowsiness;do not drink,drive, or use machinery while taking  Qty: 10 Tab, Refills: 0      naproxen (NAPROSYN) 250 mg tablet Take 1 Tab by mouth two (2) times daily (with meals) for 7 days. Qty: 14 Tab, Refills: 0         CONTINUE these medications which have NOT CHANGED    Details   aspirin (ASPIRIN) 325 mg tablet Take 325 mg by mouth daily. metoprolol succinate (TOPROL-XL) 50 mg XL tablet Take 100 mg by mouth daily. sacubitril-valsartan (ENTRESTO) 49 mg/51 mg tablet Take 1 Tab by mouth two (2) times a day. allopurinol (ZYLOPRIM) 300 mg tablet Take 300 mg by mouth daily. 2.   Follow-up Information     Follow up With Details Comments Contact Info    PRIMARY CARE DOCTOR Schedule an appointment as soon as possible for a visit in 4 days  SEE ATTACHED LIST    [facility] EM[facility]Y DEPT Go in 1 day If symptoms worsen [address]  [facility] [address]   [address] [facility] [address] [facility] Schedule an appointment as soon as possible for a visit in 1 day [address]  [phone]        Return to ED if worse     DISCHARGE NOTE  1:34 AM  The patient has been re-evaluated and is ready for discharge. Reviewed available results with patient. Counseled pt on diagnosis and care plan. Pt has expressed understanding, and all questions have been answered. Pt agrees with plan and agrees to F/U as recommended, or return to the ED if their sxs worsen. Discharge instructions have been provided and explained to the pt, along with reasons to return to the ED. Written by Luis Calderon ED Scribe, as dictated by Juventino Crabtree MD.      This note is prepared by Luis Calderon, acting as Scribe for Juventino Crabtree MD.    Juventino Crabtree MD: The scribe's documentation has been prepared under my direction and personally reviewed by me in its entirety.  I confirm that the note above accurately reflects all work, treatment, procedures, and medical decision making performed by me.

## 2017-01-27 NOTE — ED NOTES
33 yo male Pt with PMH of CAD, heart failure with pacemaker placed originally 11yrs prior, changed 5yrs ago presents to ED for chest pain, nausea, vomiting, fatigue and SOB x 3 days, states \"I feel like my pacemaker got dislodged, my heart normally goes at 85 but for the last 2 hours it's been going all over the place\", HR fluctuating between , oriented to room and call bell, cardiac and continuous spO2 monitoring initiated, IV started, blood samples collected and sent to lab for analysis, EKG and chest xray completed, plan of care reviewed, call bell in reach, side rails up x2, will continue to monitor. 10:21 PM  Medicated for pain and nausea as ordered, will continue to monitor. 12:27 AM  Medicated with Toradol and Percocet for 6/10 chest pain. 2:17 AM  Provider at bedside for follow up.

## 2017-01-27 NOTE — DISCHARGE INSTRUCTIONS
Chest Pain: Care Instructions  Your Care Instructions  There are many things that can cause chest pain. Some are not serious and will get better on their own in a few days. But some kinds of chest pain need more testing and treatment. Your doctor may have recommended a follow-up visit in the next 8 to 12 hours. If you are not getting better, you may need more tests or treatment. Even though your doctor has released you, you still need to watch for any problems. The doctor carefully checked you, but sometimes problems can develop later. If you have new symptoms or if your symptoms do not get better, get medical care right away. If you have worse or different chest pain or pressure that lasts more than 5 minutes or you passed out (lost consciousness), call 911 or seek other emergency help right away. A medical visit is only one step in your treatment. Even if you feel better, you still need to do what your doctor recommends, such as going to all suggested follow-up appointments and taking medicines exactly as directed. This will help you recover and help prevent future problems. How can you care for yourself at home? · Rest until you feel better. · Take your medicine exactly as prescribed. Call your doctor if you think you are having a problem with your medicine. · Do not drive after taking a prescription pain medicine. When should you call for help? Call 911 if:  · You passed out (lost consciousness). · You have severe difficulty breathing. · You have symptoms of a heart attack. These may include:  ¨ Chest pain or pressure, or a strange feeling in your chest.  ¨ Sweating. ¨ Shortness of breath. ¨ Nausea or vomiting. ¨ Pain, pressure, or a strange feeling in your back, neck, jaw, or upper belly or in one or both shoulders or arms. ¨ Lightheadedness or sudden weakness. ¨ A fast or irregular heartbeat.   After you call 911, the  may tell you to chew 1 adult-strength or 2 to 4 low-dose aspirin. Wait for an ambulance. Do not try to drive yourself. Call your doctor today if:  · You have any trouble breathing. · Your chest pain gets worse. · You are dizzy or lightheaded, or you feel like you may faint. · You are not getting better as expected. · You are having new or different chest pain. Where can you learn more? Go to http://mj-niels.info/. Enter A120 in the search box to learn more about \"Chest Pain: Care Instructions. \"  Current as of: May 27, 2016  Content Version: 11.1  © 2818-6490 [employer]. Care instructions adapted under license by [employer] (which disclaims liability or warranty for this information). If you have questions about a medical condition or this instruction, always ask your healthcare professional. [address]  any warranty or liability for your use of this information.    ======================================================================    [facility] Departments     For adult and child immunizations, family planning, TB screening, STD testing and women's health services. [facility]: [place] [address] [phone]      [facility] 25   657 [facility]   [address]   170 [facility]: [facility] [address] 200 [facility] [phone]      240 [facility]          [address] [address]     For primary care services, woman and child wellness, and some clinics providing specialty care. [facility] -- 1011 [facility]. [address] [place] [address] [phone]   411 [facility] 200 [facility] 3614 [facility] [phone]   339 [facility] [address]  [phone]   [address] [facility] [address] [place] [address]  [phone]   [address] [place] [address] [phone]   [address] [phone]   Gonzalo Escalante [address], Lenard Cutler [phone]   [facility]: [facility] gaudencio Valles [facility], [address] [phone]     [address] [phone]   [address] [phone]   [employer] [facility]  [address], [address] ([email or web address] [email or web address]/mission. asp) [phone]         Sexual Health/Woman Wellness Clinics    For STD/HIV testing and treatment, pregnancy testing and services, men's health, birth control services, LGBT services, and hepatitis/HPV vaccine services. [facility] [employer] [address] [facility] 75 [facility] 1579 600 [facility] [phone]   [facility] [address] , 5th floor [phone]   Pregnancy 3928 [facility] [address] Women [address]  [place] [phone]         [place] [address]82 [facility] [address] [place] [address]   [phone]   [place]   [phone]   Women, Infant and Children's Services: [address] [phone]       [address] [phone]   [facility]   [phone]   [facility]   [phone]   [facility] Psychiatry     [phone]   [address] Crisis   1212 [facility] [phone]     Local Primary Care Physicians  64 [facility] [phone]  MD Mu Hopkins MD Karoline Joe, MD [facility] Doctors [phone]  Nadege Muñoz, [facility]  MD Shwetha Caruso MD Armando Hoar, MD [address]  [phone]  [place] [address] [facility] [phone]  MD Lilia Price MD Charlsie Oiler, MD Juventino River, MD Florentino Brasher Omkar Serna, MD Mariaelena Peterson, MD Christianne iSmon, NP [address] [phone]  Carmell Breath, MD Razia Berrios, MD Queenie Watson, MD John Paul Mata, MD Peggy Saini, MD Burton Lares, MD Sree Zhou, MD   [address] [facility]  Brennen Menjivar MD [address] [phone]  Jose L Butt, MD Lennox Mimes, NP  Niraj Rivera, MD Kathryn Mims, MD Adam Posada, MD Flora Rausch, MD Elian Donaldson, MD   3600 [facility] [phone]  Mikey Beth, MD Infante Pac, FNP  Betsy Rey, NP  Waqar Martin, MD Rosemarie Scott, MD Panchito Hinojosa, MD Jose Mccullough, MD AYON [facility] [phone]  Marni Witt, MD Zhen Guerin, MD Coni Rivera, MD Mary Anne Henry, MD Russ Chavez, MD   [address] [phone]  Yaneth Swanson, MD Anamika Lau, MD [facility] [phone]  Shyam Dudley, MD Shane Obrien, MD Angi Mccann MD   [facility] Physicians [phone]  Dana Herndon, MD Lyndsey Baig, MD Shira Bruce, MD Sheryle Amble, MD El Kerns, MD Rei Dueñas, NP  Errol Scott MD 1619 [facility]   [phone]  Erasto Herr, MD Juma Lopez, MD Carola Waldrop MD   [address] [facility] [address] [phone]  [address], MD Juan Murphy, FNP  Hans Ruhsing, PAPOONAM Rushing, FNP  Patti Doanhue, FLORA Enriquez, MD Kate Aguirre, NP   Noam Lovell, DO Miscellaneous:  Alejandra Jones [phone]

## 2017-02-05 ENCOUNTER — HOSPITAL ENCOUNTER (EMERGENCY)
Age: 32
Discharge: LEFT BEFORE DISCHARGE | End: 2017-02-05
Attending: EMERGENCY MEDICINE
Payer: COMMERCIAL

## 2017-02-05 VITALS
HEIGHT: 67 IN | OXYGEN SATURATION: 98 % | SYSTOLIC BLOOD PRESSURE: 98 MMHG | RESPIRATION RATE: 11 BRPM | DIASTOLIC BLOOD PRESSURE: 59 MMHG | WEIGHT: 160 LBS | BODY MASS INDEX: 25.11 KG/M2 | HEART RATE: 85 BPM | TEMPERATURE: 98.4 F

## 2017-02-05 LAB
ALBUMIN SERPL BCP-MCNC: 4.3 G/DL (ref 3.5–5)
ALBUMIN/GLOB SERPL: 1.2 {RATIO} (ref 1.1–2.2)
ALP SERPL-CCNC: 81 U/L (ref 45–117)
ALT SERPL-CCNC: 24 U/L (ref 12–78)
ANION GAP BLD CALC-SCNC: 7 MMOL/L (ref 5–15)
AST SERPL W P-5'-P-CCNC: 16 U/L (ref 15–37)
BASOPHILS # BLD AUTO: 0 K/UL (ref 0–0.1)
BASOPHILS # BLD: 0 % (ref 0–1)
BILIRUB SERPL-MCNC: 0.8 MG/DL (ref 0.2–1)
BUN SERPL-MCNC: 20 MG/DL (ref 6–20)
BUN/CREAT SERPL: 19 (ref 12–20)
CALCIUM SERPL-MCNC: 8.3 MG/DL (ref 8.5–10.1)
CHLORIDE SERPL-SCNC: 109 MMOL/L (ref 97–108)
CO2 SERPL-SCNC: 27 MMOL/L (ref 21–32)
CREAT SERPL-MCNC: 1.06 MG/DL (ref 0.7–1.3)
EOSINOPHIL # BLD: 0.1 K/UL (ref 0–0.4)
EOSINOPHIL NFR BLD: 1 % (ref 0–7)
ERYTHROCYTE [DISTWIDTH] IN BLOOD BY AUTOMATED COUNT: 13.1 % (ref 11.5–14.5)
GLOBULIN SER CALC-MCNC: 3.5 G/DL (ref 2–4)
GLUCOSE SERPL-MCNC: 82 MG/DL (ref 65–100)
HCT VFR BLD AUTO: 43.6 % (ref 36.6–50.3)
HGB BLD-MCNC: 14.8 G/DL (ref 12.1–17)
LYMPHOCYTES # BLD AUTO: 34 % (ref 12–49)
LYMPHOCYTES # BLD: 1.7 K/UL (ref 0.8–3.5)
MCH RBC QN AUTO: 32 PG (ref 26–34)
MCHC RBC AUTO-ENTMCNC: 33.9 G/DL (ref 30–36.5)
MCV RBC AUTO: 94.2 FL (ref 80–99)
MONOCYTES # BLD: 0.4 K/UL (ref 0–1)
MONOCYTES NFR BLD AUTO: 8 % (ref 5–13)
NEUTS SEG # BLD: 2.9 K/UL (ref 1.8–8)
NEUTS SEG NFR BLD AUTO: 57 % (ref 32–75)
PLATELET # BLD AUTO: 128 K/UL (ref 150–400)
POTASSIUM SERPL-SCNC: 3.9 MMOL/L (ref 3.5–5.1)
PROT SERPL-MCNC: 7.8 G/DL (ref 6.4–8.2)
RBC # BLD AUTO: 4.63 M/UL (ref 4.1–5.7)
SODIUM SERPL-SCNC: 143 MMOL/L (ref 136–145)
TROPONIN I SERPL-MCNC: <0.04 NG/ML
WBC # BLD AUTO: 5.2 K/UL (ref 4.1–11.1)

## 2017-02-05 PROCEDURE — 80053 COMPREHEN METABOLIC PANEL: CPT | Performed by: EMERGENCY MEDICINE

## 2017-02-05 PROCEDURE — 84484 ASSAY OF TROPONIN QUANT: CPT | Performed by: EMERGENCY MEDICINE

## 2017-02-05 PROCEDURE — 36415 COLL VENOUS BLD VENIPUNCTURE: CPT | Performed by: EMERGENCY MEDICINE

## 2017-02-05 PROCEDURE — 93005 ELECTROCARDIOGRAM TRACING: CPT

## 2017-02-05 PROCEDURE — 85025 COMPLETE CBC W/AUTO DIFF WBC: CPT | Performed by: EMERGENCY MEDICINE

## 2017-02-05 PROCEDURE — 75810000275 HC EMERGENCY DEPT VISIT NO LEVEL OF CARE

## 2017-02-06 LAB
ATRIAL RATE: 88 BPM
CALCULATED R AXIS, ECG10: 173 DEGREES
CALCULATED T AXIS, ECG11: 17 DEGREES
DIAGNOSIS, 93000: NORMAL
P-R INTERVAL, ECG05: 104 MS
Q-T INTERVAL, ECG07: 394 MS
QRS DURATION, ECG06: 102 MS
QTC CALCULATION (BEZET), ECG08: 476 MS
VENTRICULAR RATE, ECG03: 88 BPM

## 2017-02-06 NOTE — ED NOTES
Pt placed call light on and reported that he would like to leave. This nurse attempted to explain to pt that he should wait and be evaluated by the . Timothy Foy reports that he would still like to leave. This nurse spoke with  [facility] who reports that we cannot keep pt here and the [address] Dr should be here soon. This nurse returned to bedside to advise pt that he should be the first pt that the night shift Dr sees. Pt reports that he has waited too long and that he would like to leave. IV removed and pressure dressing applied. Pt ambulatory from department.

## 2017-02-06 NOTE — ED NOTES
This nurse spoke with Charge RN Price Calloway about having a provider evaluate pt as pt is complaining of increasing chest pain.

## 2017-02-06 NOTE — ED NOTES
Arrived to start of my shift at 10pm and patient had already left the department. I did not evaluate him or have the opportunity to speak with him prior to his elopement.

## 2017-02-06 NOTE — ED TRIAGE NOTES
Pt arrives from home c/o RIGHT neck and arm pain that radiates to his RIGHT chest.  Pt states that he is not sure if it is cardiac related or because of his recent stress. Pt states that he would also like to speak with a [employer] counselor for increased depression and anxiety after finding out his daughter was molested. Pt crying in triage.

## 2017-05-01 ENCOUNTER — HOSPITAL ENCOUNTER (EMERGENCY)
Age: 32
Discharge: HOME | End: 2017-05-01
Attending: EMERGENCY MEDICINE
Payer: COMMERCIAL

## 2017-05-01 ENCOUNTER — APPOINTMENT (OUTPATIENT)
Dept: GENERAL RADIOLOGY | Age: 32
End: 2017-05-01
Attending: EMERGENCY MEDICINE
Payer: COMMERCIAL

## 2017-05-01 VITALS
HEIGHT: 67 IN | RESPIRATION RATE: 12 BRPM | SYSTOLIC BLOOD PRESSURE: 102 MMHG | DIASTOLIC BLOOD PRESSURE: 58 MMHG | HEART RATE: 86 BPM | WEIGHT: 158.73 LBS | BODY MASS INDEX: 24.91 KG/M2 | OXYGEN SATURATION: 93 % | TEMPERATURE: 97.7 F

## 2017-05-01 DIAGNOSIS — R07.89 ATYPICAL CHEST PAIN: Primary | ICD-10-CM

## 2017-05-01 DIAGNOSIS — Z45.018 PACEMAKER REPROGRAMMING/CHECK: ICD-10-CM

## 2017-05-01 LAB
ALBUMIN SERPL BCP-MCNC: 4.7 G/DL (ref 3.5–5)
ALBUMIN/GLOB SERPL: 1.2 {RATIO} (ref 1.1–2.2)
ALP SERPL-CCNC: 90 U/L (ref 45–117)
ALT SERPL-CCNC: 29 U/L (ref 12–78)
ANION GAP BLD CALC-SCNC: 8 MMOL/L (ref 5–15)
AST SERPL W P-5'-P-CCNC: 20 U/L (ref 15–37)
BASOPHILS # BLD AUTO: 0 K/UL (ref 0–0.1)
BASOPHILS # BLD: 0 % (ref 0–1)
BILIRUB SERPL-MCNC: 1 MG/DL (ref 0.2–1)
BNP SERPL-MCNC: 347 PG/ML (ref 0–125)
BUN SERPL-MCNC: 18 MG/DL (ref 6–20)
BUN/CREAT SERPL: 15 (ref 12–20)
CALCIUM SERPL-MCNC: 9.2 MG/DL (ref 8.5–10.1)
CHLORIDE SERPL-SCNC: 101 MMOL/L (ref 97–108)
CK MB CFR SERPL CALC: NORMAL % (ref 0–2.5)
CK MB SERPL-MCNC: <1 NG/ML (ref 5–25)
CK SERPL-CCNC: 126 U/L (ref 39–308)
CK SERPL-CCNC: 146 U/L (ref 39–308)
CO2 SERPL-SCNC: 27 MMOL/L (ref 21–32)
CREAT SERPL-MCNC: 1.2 MG/DL (ref 0.7–1.3)
EOSINOPHIL # BLD: 0 K/UL (ref 0–0.4)
EOSINOPHIL NFR BLD: 1 % (ref 0–7)
ERYTHROCYTE [DISTWIDTH] IN BLOOD BY AUTOMATED COUNT: 12.7 % (ref 11.5–14.5)
GLOBULIN SER CALC-MCNC: 3.8 G/DL (ref 2–4)
GLUCOSE SERPL-MCNC: 84 MG/DL (ref 65–100)
HCT VFR BLD AUTO: 43.3 % (ref 36.6–50.3)
HGB BLD-MCNC: 15.4 G/DL (ref 12.1–17)
LYMPHOCYTES # BLD AUTO: 17 % (ref 12–49)
LYMPHOCYTES # BLD: 1.2 K/UL (ref 0.8–3.5)
MCH RBC QN AUTO: 31.6 PG (ref 26–34)
MCHC RBC AUTO-ENTMCNC: 35.6 G/DL (ref 30–36.5)
MCV RBC AUTO: 88.7 FL (ref 80–99)
MONOCYTES # BLD: 0.5 K/UL (ref 0–1)
MONOCYTES NFR BLD AUTO: 7 % (ref 5–13)
NEUTS SEG # BLD: 5.3 K/UL (ref 1.8–8)
NEUTS SEG NFR BLD AUTO: 75 % (ref 32–75)
PLATELET # BLD AUTO: 146 K/UL (ref 150–400)
POTASSIUM SERPL-SCNC: 3.8 MMOL/L (ref 3.5–5.1)
PROT SERPL-MCNC: 8.5 G/DL (ref 6.4–8.2)
RBC # BLD AUTO: 4.88 M/UL (ref 4.1–5.7)
SODIUM SERPL-SCNC: 136 MMOL/L (ref 136–145)
TROPONIN I SERPL-MCNC: <0.04 NG/ML
TROPONIN I SERPL-MCNC: <0.04 NG/ML
WBC # BLD AUTO: 7 K/UL (ref 4.1–11.1)

## 2017-05-01 PROCEDURE — 36415 COLL VENOUS BLD VENIPUNCTURE: CPT | Performed by: EMERGENCY MEDICINE

## 2017-05-01 PROCEDURE — 71010 XR CHEST PORT: CPT

## 2017-05-01 PROCEDURE — 99284 EMERGENCY DEPT VISIT MOD MDM: CPT

## 2017-05-01 PROCEDURE — 82553 CREATINE MB FRACTION: CPT | Performed by: EMERGENCY MEDICINE

## 2017-05-01 PROCEDURE — 93005 ELECTROCARDIOGRAM TRACING: CPT

## 2017-05-01 PROCEDURE — 82550 ASSAY OF CK (CPK): CPT | Performed by: EMERGENCY MEDICINE

## 2017-05-01 PROCEDURE — 74011250636 HC RX REV CODE- 250/636: Performed by: EMERGENCY MEDICINE

## 2017-05-01 PROCEDURE — 83880 ASSAY OF NATRIURETIC PEPTIDE: CPT | Performed by: EMERGENCY MEDICINE

## 2017-05-01 PROCEDURE — 80053 COMPREHEN METABOLIC PANEL: CPT | Performed by: EMERGENCY MEDICINE

## 2017-05-01 PROCEDURE — 84484 ASSAY OF TROPONIN QUANT: CPT | Performed by: EMERGENCY MEDICINE

## 2017-05-01 PROCEDURE — 85025 COMPLETE CBC W/AUTO DIFF WBC: CPT | Performed by: EMERGENCY MEDICINE

## 2017-05-01 PROCEDURE — 96374 THER/PROPH/DIAG INJ IV PUSH: CPT

## 2017-05-01 PROCEDURE — 96375 TX/PRO/DX INJ NEW DRUG ADDON: CPT

## 2017-05-01 RX ORDER — MORPHINE SULFATE 2 MG/ML
2 INJECTION, SOLUTION INTRAMUSCULAR; INTRAVENOUS
Status: COMPLETED | OUTPATIENT
Start: 2017-05-01 | End: 2017-05-01

## 2017-05-01 RX ORDER — SPIRONOLACTONE 25 MG/1
25 TABLET ORAL DAILY
COMMUNITY
End: 2017-10-10 | Stop reason: SDUPTHER

## 2017-05-01 RX ORDER — DEXTROAMPHETAMINE SACCHARATE, AMPHETAMINE ASPARTATE MONOHYDRATE, DEXTROAMPHETAMINE SULFATE AND AMPHETAMINE SULFATE 5; 5; 5; 5 MG/1; MG/1; MG/1; MG/1
20 CAPSULE, EXTENDED RELEASE ORAL
COMMUNITY
End: 2017-09-25

## 2017-05-01 RX ORDER — FENTANYL CITRATE 50 UG/ML
50 INJECTION, SOLUTION INTRAMUSCULAR; INTRAVENOUS
Status: COMPLETED | OUTPATIENT
Start: 2017-05-01 | End: 2017-05-01

## 2017-05-01 RX ORDER — NAPROXEN 375 MG/1
375 TABLET ORAL 2 TIMES DAILY WITH MEALS
Qty: 10 TAB | Refills: 0 | Status: SHIPPED | OUTPATIENT
Start: 2017-05-01 | End: 2017-05-06

## 2017-05-01 RX ORDER — ONDANSETRON 2 MG/ML
4 INJECTION INTRAMUSCULAR; INTRAVENOUS
Status: COMPLETED | OUTPATIENT
Start: 2017-05-01 | End: 2017-05-01

## 2017-05-01 RX ORDER — FUROSEMIDE 20 MG/1
20 TABLET ORAL 2 TIMES DAILY
COMMUNITY
End: 2017-10-10 | Stop reason: SDUPTHER

## 2017-05-01 RX ORDER — LISINOPRIL 20 MG/1
20 TABLET ORAL DAILY
COMMUNITY
End: 2018-09-17 | Stop reason: ALTCHOICE

## 2017-05-01 RX ORDER — LIDOCAINE 50 MG/G
PATCH TOPICAL
Qty: 1 EACH | Refills: 0 | Status: SHIPPED | OUTPATIENT
Start: 2017-05-01 | End: 2017-09-25

## 2017-05-01 RX ADMIN — FENTANYL CITRATE 50 MCG: 50 INJECTION, SOLUTION INTRAMUSCULAR; INTRAVENOUS at 20:18

## 2017-05-01 RX ADMIN — ONDANSETRON HYDROCHLORIDE 4 MG: 2 INJECTION, SOLUTION INTRAMUSCULAR; INTRAVENOUS at 17:22

## 2017-05-01 RX ADMIN — Medication 2 MG: at 17:22

## 2017-05-01 NOTE — ED NOTES
Assumed care of pt from [place], [address] [place] [address]. Pt resting quietly and in no acute distress. Call bell within reach.

## 2017-05-01 NOTE — ED PROVIDER NOTES
HPI Comments: Fito Renner is a 32 y.o. male with h/o CAD, transposition great arteries, and heart failure with pacemaker who presents ambulatory to the ED c/o 8/10 chest pressure with associated SOB x 10 hours. Pt reports being seen by cardiology at [facility] on 2/14/17 and receiving a \"full work up\". He states \"they found something new that [facility] did not find. \" Additionally, he notes having his Lisinopril increased. Furthermore, he reports having his pacemaker interrogated about 1 month ago ([employer]). Pt specifically denies lower extremity edema, fever, chills or nausea. PCP: PROVIDER UNKNOWN  Cardiology: Dr. Hammer Late Hx: -tobacco, -EtOH, -Illicit Drugs      There are no other complaints, changes or physical findings at this time. The history is provided by the patient. Past Medical History:   Diagnosis Date    Asthma     CAD (coronary artery disease)     Gout     Heart failure ([facility] [address]     with pacemaker, states transition of great vessels    Pacemaker     Stroke [facility])     Transposition great arteries        Past Surgical History:   Procedure Laterality Date    HX PACEMAKER           History reviewed. No pertinent family history. Social History     Social History    Marital status:      Spouse name: N/A    Number of children: N/A    Years of education: N/A     Occupational History    Not on file. Social History Main Topics    Smoking status: Never Smoker    Smokeless tobacco: Not on file    Alcohol use No    Drug use: No    Sexual activity: Not on file     Other Topics Concern    Not on file     Social History Narrative         ALLERGIES: Betadine [povidone-iodine]    Review of Systems   Constitutional: Negative. Negative for activity change, appetite change, chills, fatigue, fever and unexpected weight change. HENT: Negative. Negative for congestion, hearing loss, rhinorrhea, sneezing and voice change. Eyes: Negative.   Negative for pain and visual disturbance. Respiratory: Positive for shortness of breath. Negative for apnea, cough, choking and chest tightness. Cardiovascular: Positive for chest pain (pressure). Negative for palpitations. Gastrointestinal: Negative. Negative for abdominal distention, abdominal pain, blood in stool, diarrhea, nausea and vomiting. Genitourinary: Negative. Negative for difficulty urinating, flank pain, frequency and urgency. No discharge   Musculoskeletal: Negative. Negative for arthralgias, back pain, myalgias and neck stiffness. Skin: Negative. Negative for color change and rash. Neurological: Negative. Negative for dizziness, seizures, syncope, speech difficulty, weakness, numbness and headaches. Hematological: Negative for adenopathy. Psychiatric/Behavioral: Negative. Negative for agitation, behavioral problems, dysphoric mood and suicidal ideas. The patient is not nervous/anxious. All other systems reviewed and are negative.       Patient Vitals for the past 12 hrs:   Temp Pulse Resp BP SpO2   05/01/17 1831 - 85 13 - 92 %   05/01/17 1830 - 85 12 108/55 -   05/01/17 1802 - 85 15 - 92 %   05/01/17 1746 - 86 19 - 92 %   05/01/17 1745 - 85 14 109/72 -   05/01/17 1730 - 100 18 115/73 94 %   05/01/17 1653 97.7 °F (36.5 °C) 98 18 118/81 98 %     Physical Exam     Physical Examination: General appearance - WDWN, in no apparent distress  Head - NC/AT  Eyes - pupils equal, round  and reactive, extraocular eye movements intact, conj/sclera clear, anicteric  Mouth - mucous membranes moist, pharynx normal without lesions  Nose/Ears - nares clear, Tms & canals clear  Neck - supple, no significant adenopathy, trachea midline, no crepitus, c spine diffusely non-tender, no step offs  Chest - pacemaker in L upper chest wall, Normal respiratory effort, clear to auscultation bilaterally, no wheezes/rales/rhonchi  Heart - normal rate and regular rhythm, S1 and S2 normal, no murmurs, gallops, or rubs  Abdomen - soft, nontender, nondistended, nabs, no masses, guarding, rebound or rigidity  Neurological - alert, oriented, normal speech, cranial nerves intact, no focal motor findings, motor & sensory diffusely intact, normal gait  Extremities/MS - peripheral pulses normal, no pedal edema, all joints atraumatic, FROM, non-tender, no gross deformities, spine diffusely non-tender  Skin - normal coloration and turgor, no rashes, no lesions or lacerations    `    MDM  Number of Diagnoses or Management Options  Atypical chest pain:   Pacemaker reprogramming/check:   Diagnosis management comments:   DDx: CHF, ACS, MI, chronic chest pain, muscular pain       Amount and/or Complexity of Data Reviewed  Clinical lab tests: ordered and reviewed  Tests in the radiology section of CPT®: ordered and reviewed  Tests in the medicine section of CPT®: ordered and reviewed  Review and summarize past medical records: yes  Discuss the patient with other providers: yes (Cardiology)    Patient Progress  Patient progress: stable    ED Course       Procedures     EKG interpretation: (Preliminary) 16:57  Rhythm: paced; and regular . Rate (approx.): 86 bpm; Axis: normal; QRS interval: old T wave inversion v2-v3; ST/T wave: more pronounced v2-v3, no acute changes  Written by Rachel Mendez ED Scribe, as dictated by Jorje Chowdhury. Minus MD Cecilia    5:21 PM  Per chart review, prior ED visit  \"CONSULT NOTE:   2:05 AM  Karmen Shelby MD spoke with Dr. Kyle Hernandez [facility] cardiology),   Specialty: Cardiology  Discussed pt's hx, disposition, and available diagnostic and imaging results. Reviewed care plans. Consultant agrees with plans as outlined. Pt had normal cath last year; EKG similar to prior; multiple negative D dimers with low suspicion for PE; multiple ED visits for CP with negative work ups. Recommends d/c, pt may be [facility] early next week.     Written by Cher Morales ED Scribe, as dictated by Karmen Shelby MD.\"    Similar presentation today, advised pt to call [phone] cardiology and follow up there or locally with Dr. Estrella Kevin if needing local follow up, return to ER if worse. Pt agrees, feels better, no pain at this time. Progress Note:  7:06 PM  Paged [facility] representative to have the pt's pacemaker interrogated. Written by ELIJAH Benitezibscott, as dictated by [employer] Heladio Nicholson MD    LABORATORY TESTS:      IMAGING RESULTS:    XR CHEST PORT  EXAM: XR CHEST PORT     INDICATION: Chest pain for 12 hours, increasing in severity over the past hour  . History of transposition of the great arteries     COMPARISON: 1/26/2017     FINDINGS: A portable AP radiograph of the chest was obtained at hours. The  patient is on a cardiac monitor. A single-lead pacemaker wires in place. The  lungs are clear. The cardiac and mediastinal contours and pulmonary vascularity  are stable. The bones and soft tissues are grossly within normal limits.      IMPRESSION  IMPRESSION: No acute cardiopulmonary process seen    MEDICATIONS GIVEN:  Medications   ondansetron (ZOFRAN) injection 4 mg (4 mg IntraVENous Given 5/1/17 1722)   morphine injection 2 mg (2 mg IntraVENous Given 5/1/17 1722)   fentaNYL citrate (PF) injection 50 mcg (50 mcg IntraVENous Given 5/1/17 2018)       IMPRESSION:  1. Atypical chest pain    2. Pacemaker reprogramming/check        PLAN:  1. Discharge Medication List as of 5/1/2017  8:58 PM      START taking these medications    Details   naproxen (NAPROSYN) 375 mg tablet Take 1 Tab by mouth two (2) times daily (with meals) for 5 days. , Print, Disp-10 Tab, R-0         CONTINUE these medications which have NOT CHANGED    Details   lisinopril (PRINIVIL, ZESTRIL) 20 mg tablet Take 20 mg by mouth daily. , Historical Med      furosemide (LASIX) 20 mg tablet Take 20 mg by mouth two (2) times a day., Historical Med      spironolactone (ALDACTONE) 25 mg tablet Take 25 mg by mouth daily. , Historical Med      amphetamine-dextroamphetamine XR (ADDERALL XR) 20 mg XR capsule Take 20 mg by mouth every morning., Historical Med      diazePAM (VALIUM) 5 mg tablet Take 0.5 Tabs by mouth three (3) times daily as needed for Anxiety (or muscle spasm). Max Daily Amount: 7.5 mg. Indications: MUSCLE SPASM, causes drowsiness;do not drink,drive, or use machinery while taking, Print, Disp-10 Tab, R-0      aspirin (ASPIRIN) 325 mg tablet Take 325 mg by mouth daily. , Historical Med      metoprolol succinate (TOPROL-XL) 50 mg XL tablet Take 100 mg by mouth daily. , Historical Med      sacubitril-valsartan (ENTRESTO) 49 mg/51 mg tablet Take 1 Tab by mouth two (2) times a day., Historical Med      allopurinol (ZYLOPRIM) 300 mg tablet Take 300 mg by mouth daily. , Historical Med           2. Follow-up Information     Follow up With Details Comments Contact Info    Cameron Forte MD Schedule an appointment as soon as possible for a visit in 1 day or your doctor [address]  [address]       [facility]  As needed, If symptoms worsen [address]  [address]   [address]        Return to ED if worse     DISCHARGE NOTE    The patient has been re-evaluated and is ready for discharge. Reviewed available results with patient. Counseled pt on diagnosis and care plan. Pt has expressed understanding, and all questions have been answered. Pt agrees with plan and agrees to F/U as recommended, or return to the ED if their sxs worsen. Discharge instructions have been provided and explained to the pt, along with reasons to return to the ED. Written by Maria Alejandra Witt, ED Scribe, as dictated by Baldemar Henry MD    This note is prepared by Maria Alejandra Witt, acting as Scribe for Baldemar Dotson. MD Baldemar Yoder. Kaiser Henry MD: The scribe's documentation has been prepared under my direction and personally reviewed by me in its entirety.  I confirm that the note above accurately reflects all work, treatment, procedures, and medical decision making performed by me.

## 2017-05-01 NOTE — ED NOTES
Bedside and Verbal shift change report given to Antonina Davis RN (oncoming nurse) by Dalia Payton RN (offgoing nurse). Report included the following information SBAR, ED Summary, MAR and Recent Results.

## 2017-05-02 LAB
ATRIAL RATE: 81 BPM
CALCULATED R AXIS, ECG10: -149 DEGREES
CALCULATED T AXIS, ECG11: 34 DEGREES
DIAGNOSIS, 93000: NORMAL
Q-T INTERVAL, ECG07: 376 MS
QRS DURATION, ECG06: 114 MS
QTC CALCULATION (BEZET), ECG08: 449 MS
VENTRICULAR RATE, ECG03: 86 BPM

## 2017-05-02 NOTE — DISCHARGE INSTRUCTIONS
Chest Pain: Care Instructions  Your Care Instructions  There are many things that can cause chest pain. Some are not serious and will get better on their own in a few days. But some kinds of chest pain need more testing and treatment. Your doctor may have recommended a follow-up visit in the next 8 to 12 hours. If you are not getting better, you may need more tests or treatment. Even though your doctor has released you, you still need to watch for any problems. The doctor carefully checked you, but sometimes problems can develop later. If you have new symptoms or if your symptoms do not get better, get medical care right away. If you have worse or different chest pain or pressure that lasts more than 5 minutes or you passed out (lost consciousness), call 911 or seek other emergency help right away. A medical visit is only one step in your treatment. Even if you feel better, you still need to do what your doctor recommends, such as going to all suggested follow-up appointments and taking medicines exactly as directed. This will help you recover and help prevent future problems. How can you care for yourself at home? · Rest until you feel better. · Take your medicine exactly as prescribed. Call your doctor if you think you are having a problem with your medicine. · Do not drive after taking a prescription pain medicine. When should you call for help? Call 911 if:  · You passed out (lost consciousness). · You have severe difficulty breathing. · You have symptoms of a heart attack. These may include:  ¨ Chest pain or pressure, or a strange feeling in your chest.  ¨ Sweating. ¨ Shortness of breath. ¨ Nausea or vomiting. ¨ Pain, pressure, or a strange feeling in your back, neck, jaw, or upper belly or in one or both shoulders or arms. ¨ Lightheadedness or sudden weakness. ¨ A fast or irregular heartbeat.   After you call 911, the  may tell you to chew 1 adult-strength or 2 to 4 low-dose aspirin. Wait for an ambulance. Do not try to drive yourself. Call your doctor today if:  · You have any trouble breathing. · Your chest pain gets worse. · You are dizzy or lightheaded, or you feel like you may faint. · You are not getting better as expected. · You are having new or different chest pain. Where can you learn more? Go to http://mj-niels.info/. Enter A120 in the search box to learn more about \"Chest Pain: Care Instructions. \"  Current as of: May 27, 2016  Content Version: 11.2  © 6977-2183 [employer]. Care instructions adapted under license by [employer] (which disclaims liability or warranty for this information). If you have questions about a medical condition or this instruction, always ask your healthcare professional. [address] any warranty or liability for your use of this information.

## 2017-05-20 ENCOUNTER — APPOINTMENT (OUTPATIENT)
Dept: CT IMAGING | Age: 32
End: 2017-05-20
Attending: EMERGENCY MEDICINE
Payer: COMMERCIAL

## 2017-05-20 ENCOUNTER — APPOINTMENT (OUTPATIENT)
Dept: GENERAL RADIOLOGY | Age: 32
End: 2017-05-20
Attending: EMERGENCY MEDICINE
Payer: COMMERCIAL

## 2017-05-20 ENCOUNTER — HOSPITAL ENCOUNTER (EMERGENCY)
Age: 32
Discharge: HOME | End: 2017-05-21
Attending: EMERGENCY MEDICINE
Payer: COMMERCIAL

## 2017-05-20 VITALS
WEIGHT: 160 LBS | RESPIRATION RATE: 15 BRPM | OXYGEN SATURATION: 96 % | HEART RATE: 85 BPM | DIASTOLIC BLOOD PRESSURE: 61 MMHG | BODY MASS INDEX: 25.06 KG/M2 | SYSTOLIC BLOOD PRESSURE: 104 MMHG

## 2017-05-20 DIAGNOSIS — R07.89 ATYPICAL CHEST PAIN: Primary | ICD-10-CM

## 2017-05-20 LAB
ALBUMIN SERPL BCP-MCNC: 3.9 G/DL (ref 3.5–5)
ALBUMIN/GLOB SERPL: 1.2 {RATIO} (ref 1.1–2.2)
ALP SERPL-CCNC: 82 U/L (ref 45–117)
ALT SERPL-CCNC: 35 U/L (ref 12–78)
ANION GAP BLD CALC-SCNC: 9 MMOL/L (ref 5–15)
AST SERPL W P-5'-P-CCNC: 23 U/L (ref 15–37)
BASOPHILS # BLD AUTO: 0 K/UL (ref 0–0.1)
BASOPHILS # BLD: 0 % (ref 0–1)
BILIRUB SERPL-MCNC: 0.8 MG/DL (ref 0.2–1)
BNP SERPL-MCNC: 143 PG/ML (ref 0–125)
BUN SERPL-MCNC: 28 MG/DL (ref 6–20)
BUN/CREAT SERPL: 23 (ref 12–20)
CALCIUM SERPL-MCNC: 8.4 MG/DL (ref 8.5–10.1)
CHLORIDE SERPL-SCNC: 107 MMOL/L (ref 97–108)
CK SERPL-CCNC: 152 U/L (ref 39–308)
CO2 SERPL-SCNC: 25 MMOL/L (ref 21–32)
CREAT SERPL-MCNC: 1.23 MG/DL (ref 0.7–1.3)
EOSINOPHIL # BLD: 0.1 K/UL (ref 0–0.4)
EOSINOPHIL NFR BLD: 2 % (ref 0–7)
ERYTHROCYTE [DISTWIDTH] IN BLOOD BY AUTOMATED COUNT: 13.1 % (ref 11.5–14.5)
GLOBULIN SER CALC-MCNC: 3.2 G/DL (ref 2–4)
GLUCOSE SERPL-MCNC: 96 MG/DL (ref 65–100)
HCT VFR BLD AUTO: 37.5 % (ref 36.6–50.3)
HGB BLD-MCNC: 13.2 G/DL (ref 12.1–17)
LYMPHOCYTES # BLD AUTO: 28 % (ref 12–49)
LYMPHOCYTES # BLD: 1.5 K/UL (ref 0.8–3.5)
MCH RBC QN AUTO: 31.6 PG (ref 26–34)
MCHC RBC AUTO-ENTMCNC: 35.2 G/DL (ref 30–36.5)
MCV RBC AUTO: 89.7 FL (ref 80–99)
MONOCYTES # BLD: 0.3 K/UL (ref 0–1)
MONOCYTES NFR BLD AUTO: 6 % (ref 5–13)
NEUTS SEG # BLD: 3.4 K/UL (ref 1.8–8)
NEUTS SEG NFR BLD AUTO: 64 % (ref 32–75)
PLATELET # BLD AUTO: 153 K/UL (ref 150–400)
POTASSIUM SERPL-SCNC: 4.2 MMOL/L (ref 3.5–5.1)
PROT SERPL-MCNC: 7.1 G/DL (ref 6.4–8.2)
RBC # BLD AUTO: 4.18 M/UL (ref 4.1–5.7)
SODIUM SERPL-SCNC: 141 MMOL/L (ref 136–145)
TROPONIN I SERPL-MCNC: <0.04 NG/ML
WBC # BLD AUTO: 5.3 K/UL (ref 4.1–11.1)

## 2017-05-20 PROCEDURE — 82550 ASSAY OF CK (CPK): CPT | Performed by: EMERGENCY MEDICINE

## 2017-05-20 PROCEDURE — 93005 ELECTROCARDIOGRAM TRACING: CPT

## 2017-05-20 PROCEDURE — 96375 TX/PRO/DX INJ NEW DRUG ADDON: CPT

## 2017-05-20 PROCEDURE — 99285 EMERGENCY DEPT VISIT HI MDM: CPT

## 2017-05-20 PROCEDURE — 80053 COMPREHEN METABOLIC PANEL: CPT | Performed by: EMERGENCY MEDICINE

## 2017-05-20 PROCEDURE — 74011250636 HC RX REV CODE- 250/636: Performed by: EMERGENCY MEDICINE

## 2017-05-20 PROCEDURE — 36415 COLL VENOUS BLD VENIPUNCTURE: CPT | Performed by: EMERGENCY MEDICINE

## 2017-05-20 PROCEDURE — 96376 TX/PRO/DX INJ SAME DRUG ADON: CPT

## 2017-05-20 PROCEDURE — 71020 XR CHEST PA LAT: CPT

## 2017-05-20 PROCEDURE — 83880 ASSAY OF NATRIURETIC PEPTIDE: CPT | Performed by: EMERGENCY MEDICINE

## 2017-05-20 PROCEDURE — 74011636320 HC RX REV CODE- 636/320: Performed by: EMERGENCY MEDICINE

## 2017-05-20 PROCEDURE — 74011250637 HC RX REV CODE- 250/637: Performed by: EMERGENCY MEDICINE

## 2017-05-20 PROCEDURE — 71275 CT ANGIOGRAPHY CHEST: CPT

## 2017-05-20 PROCEDURE — 96374 THER/PROPH/DIAG INJ IV PUSH: CPT

## 2017-05-20 PROCEDURE — 85025 COMPLETE CBC W/AUTO DIFF WBC: CPT | Performed by: EMERGENCY MEDICINE

## 2017-05-20 PROCEDURE — 84484 ASSAY OF TROPONIN QUANT: CPT | Performed by: EMERGENCY MEDICINE

## 2017-05-20 RX ORDER — KETOROLAC TROMETHAMINE 30 MG/ML
30 INJECTION, SOLUTION INTRAMUSCULAR; INTRAVENOUS
Status: COMPLETED | OUTPATIENT
Start: 2017-05-20 | End: 2017-05-20

## 2017-05-20 RX ORDER — SODIUM CHLORIDE 9 MG/ML
50 INJECTION, SOLUTION INTRAVENOUS
Status: COMPLETED | OUTPATIENT
Start: 2017-05-20 | End: 2017-05-20

## 2017-05-20 RX ORDER — ONDANSETRON 2 MG/ML
4 INJECTION INTRAMUSCULAR; INTRAVENOUS
Status: COMPLETED | OUTPATIENT
Start: 2017-05-20 | End: 2017-05-20

## 2017-05-20 RX ORDER — FENTANYL CITRATE 50 UG/ML
50 INJECTION, SOLUTION INTRAMUSCULAR; INTRAVENOUS
Status: COMPLETED | OUTPATIENT
Start: 2017-05-20 | End: 2017-05-20

## 2017-05-20 RX ORDER — DIPHENHYDRAMINE HYDROCHLORIDE 50 MG/ML
50 INJECTION, SOLUTION INTRAMUSCULAR; INTRAVENOUS
Status: COMPLETED | OUTPATIENT
Start: 2017-05-20 | End: 2017-05-20

## 2017-05-20 RX ORDER — LORAZEPAM 2 MG/ML
0.5 INJECTION INTRAMUSCULAR
Status: COMPLETED | OUTPATIENT
Start: 2017-05-20 | End: 2017-05-20

## 2017-05-20 RX ORDER — SODIUM CHLORIDE 0.9 % (FLUSH) 0.9 %
10 SYRINGE (ML) INJECTION
Status: COMPLETED | OUTPATIENT
Start: 2017-05-20 | End: 2017-05-20

## 2017-05-20 RX ORDER — MORPHINE SULFATE 2 MG/ML
4 INJECTION, SOLUTION INTRAMUSCULAR; INTRAVENOUS
Status: COMPLETED | OUTPATIENT
Start: 2017-05-20 | End: 2017-05-20

## 2017-05-20 RX ORDER — HYDROCODONE BITARTRATE AND ACETAMINOPHEN 5; 325 MG/1; MG/1
1 TABLET ORAL
Qty: 12 TAB | Refills: 0 | Status: SHIPPED | OUTPATIENT
Start: 2017-05-20 | End: 2017-09-25

## 2017-05-20 RX ORDER — DIAZEPAM 10 MG/2ML
5 INJECTION INTRAMUSCULAR
Status: COMPLETED | OUTPATIENT
Start: 2017-05-20 | End: 2017-05-20

## 2017-05-20 RX ORDER — ONDANSETRON 4 MG/1
4 TABLET, FILM COATED ORAL
Qty: 20 TAB | Refills: 0 | Status: SHIPPED | OUTPATIENT
Start: 2017-05-20 | End: 2017-11-27

## 2017-05-20 RX ADMIN — ONDANSETRON HYDROCHLORIDE 4 MG: 2 INJECTION, SOLUTION INTRAMUSCULAR; INTRAVENOUS at 18:17

## 2017-05-20 RX ADMIN — Medication 4 MG: at 19:14

## 2017-05-20 RX ADMIN — Medication 10 ML: at 22:08

## 2017-05-20 RX ADMIN — Medication 4 MG: at 20:54

## 2017-05-20 RX ADMIN — NITROGLYCERIN 1 INCH: 20 OINTMENT TOPICAL at 19:28

## 2017-05-20 RX ADMIN — FENTANYL CITRATE 50 MCG: 50 INJECTION, SOLUTION INTRAMUSCULAR; INTRAVENOUS at 18:41

## 2017-05-20 RX ADMIN — IOPAMIDOL 100 ML: 755 INJECTION, SOLUTION INTRAVENOUS at 22:08

## 2017-05-20 RX ADMIN — LORAZEPAM 0.5 MG: 2 INJECTION INTRAMUSCULAR; INTRAVENOUS at 18:17

## 2017-05-20 RX ADMIN — DIPHENHYDRAMINE HYDROCHLORIDE 50 MG: 50 INJECTION, SOLUTION INTRAMUSCULAR; INTRAVENOUS at 21:25

## 2017-05-20 RX ADMIN — DIAZEPAM 5 MG: 5 INJECTION, SOLUTION INTRAMUSCULAR; INTRAVENOUS at 23:40

## 2017-05-20 RX ADMIN — KETOROLAC TROMETHAMINE 30 MG: 30 INJECTION, SOLUTION INTRAMUSCULAR at 18:17

## 2017-05-20 RX ADMIN — SODIUM CHLORIDE 50 ML/HR: 900 INJECTION, SOLUTION INTRAVENOUS at 22:08

## 2017-05-20 NOTE — ED NOTES
Pt arrives to ED via EMS c/o 10/10 chest pain, pt is on [facility] HEART transplant list, had a cardiac cath this past Monday at [facility], pt is tearful, monitor x 3.

## 2017-05-20 NOTE — ED PROVIDER NOTES
HPI Comments: Natalya Monroe is a 32 y.o. male with PMHx significant for gout, asthma, CAD, heart failure and stroke presenting ambulatory to ED [facility] ED with c/o acute on chronic 10/10 sharp chest pain that has been worse over the  The pt notes additional sx of SOB, nausea and vomiting. He states that his sx radiate up his neck, down his right arm and through to his back. He reports taking 3 sublingual NTGs and an aspirin with no relief of his sx. He states that he has a chronic history of chest pain and sees Dr. Kierra Fairchild of [facility]. He notes that he had a cardiac catheterization done 6 days ago that showed no blockages. He reports that he is on a waiting list for a heart transplant at [facility]. He states that he is here in the ED today for pain management and notes that he is due to follow up with cardiology next week. He reports that nothing makes his chest pain better or worse. He denies seeing a cardiologist in [place]. PCP: PROVIDER UNKNOWN  PSHx: pacemaker   Social History:  (-) Tobacco,   (-) EtOH,      (-) Drugs     There are no other complaints, changes, or physical findings at this time. The history is provided by the patient. Past Medical History:   Diagnosis Date    Asthma     CAD (coronary artery disease)     Gout     Heart failure ([address]     with pacemaker, states transition of great vessels    Pacemaker     Stroke [facility])     Transposition great arteries        Past Surgical History:   Procedure Laterality Date    HX PACEMAKER           No family history on file. Social History     Social History    Marital status:      Spouse name: N/A    Number of children: N/A    Years of education: N/A     Occupational History    Not on file.      Social History Main Topics    Smoking status: Never Smoker    Smokeless tobacco: Not on file    Alcohol use No    Drug use: No    Sexual activity: Not on file     Other Topics Concern    Not on file     Social History Narrative         ALLERGIES: Betadine [povidone-iodine]    Review of Systems   Constitutional: Negative for chills and fever. Respiratory: Positive for shortness of breath. Negative for cough. Cardiovascular: Positive for chest pain. Gastrointestinal: Positive for nausea and vomiting. Negative for constipation and diarrhea. Musculoskeletal: Positive for back pain and myalgias. Neurological: Negative for weakness and numbness. All other systems reviewed and are negative. Vitals:    05/20/17 1741 05/20/17 1800 05/20/17 1845 05/20/17 1857   BP: 119/73 113/82 112/65    Pulse: 86 85 85    Resp: 16 17 16    SpO2:  96% 92%    Weight:    72.6 kg (160 lb)            Physical Exam   Constitutional: He is oriented to person, place, and time. He appears well-developed and well-nourished. Pt is writhing around in pain. HENT:   Head: Normocephalic and atraumatic. Eyes: Conjunctivae and EOM are normal.   Neck: Normal range of motion. Neck supple. Cardiovascular: Normal rate and regular rhythm. Pulmonary/Chest: Effort normal and breath sounds normal. No respiratory distress. Abdominal: Soft. He exhibits no distension. There is no tenderness. Musculoskeletal: Normal range of motion. Mild chest tenderness over the mid sternal area   Neurological: He is alert and oriented to person, place, and time. Skin: Skin is warm and dry. Old surgical scar over the chest wall. Psychiatric: He has a normal mood and affect. Nursing note and vitals reviewed. MDM  Number of Diagnoses or Management Options  Atypical chest pain:   Diagnosis management comments: Patient presents with CP. DDx:  ACS, Aortic dissection, PNA, PE, PTX, pericarditis, myocarditis, GERD, costochondritis, anxiety. Most likely ACS vs chronic chest pain given the HPI and Physical exam. Recent cardiac catheterization is reassuring. Will consult with [address] [place] [address] cardiology. Will obtain labs, CXR, EKG.          Amount and/or Complexity of Data Reviewed  Clinical lab tests: reviewed and ordered  Tests in the radiology section of CPT®: ordered and reviewed  Tests in the medicine section of CPT®: reviewed and ordered  Review and summarize past medical records: yes  Independent visualization of images, tracings, or specimens: yes    Patient Progress  Patient progress: stable    ED Course       Procedures    EKG interpretation: (Preliminary) 17:40  Rhythm: normal sinus rhythm; and regular . Rate (approx.): 84; Axis: right superior axis deviation; MD interval: normal; QRS interval: normal ; ST/T wave: normal;  Other findings: right bundle branch block, ECG same as previous ECG. CONSULT NOTE:   7:12 PM  Agueda Parker spoke with Dr. Hannah Singh,  Specialty: Cardiology  Discussed pt's hx, disposition, and available diagnostic and imaging results. Reviewed care plans. Consultant agrees with plans as outlined. Dr. Hannah Singh reports that the pt recently had a cardiac catheterization at [address] [place] [address] that was normal. He states that he does not believe that the pt's chest pain is due to his heart in any way. He notes that the pt is not on any heart transplant list at [address] [place] [address]. He also reports that the pt has been referred to pain management. Written by ELIJAH Laws, as dictated by Agueda Parker M.D.    7:13 PM  I have just reevaluated the patient. He is currently writhing in pain. Results have been reviewed with them and their questions have been answered. We will continue to review further results as they come available. 8:24 PM  Pt's pain is still not controlled. Will obtain CT to rule out disection and PE. If unable to control pt's pain and if CT is negative the pt will be admitted for pain management. 10:59 PM  The pt's CTA is negative for a PE and Aortic Dissection. He is still having intermittent chest pains that are causing him to wake up from his sleep. He states that he does not feel safe going home.  He will be admitted for pain control. CONSULT NOTE:   11:05 PM  Anneliese Avila M.D spoke with Dr. Alissa Viera,   Specialty: Hospitalist  Discussed pt's hx, disposition, and available diagnostic and imaging results. Reviewed care plans. Consultant will evaluate pt for admission. Written by Kip Perez ED Scribe, as dictated by Anneliese Avila M.D.    11:19 PM   Dr. Alissa Viera reports that she does not believe that the pt's presentation meets admission criteria. She recommends that he follow up with pain management as an outpatient. LABORATORY TESTS:  Recent Results (from the past 12 hour(s))   EKG, 12 LEAD, INITIAL    Collection Time: 05/20/17  5:40 PM   Result Value Ref Range    Ventricular Rate 84 BPM    Atrial Rate 84 BPM    P-R Interval 154 ms    QRS Duration 104 ms    Q-T Interval 382 ms    QTC Calculation (Bezet) 451 ms    Calculated R Axis -128 degrees    Calculated T Axis 62 degrees    Diagnosis       Normal sinus rhythm  Right superior axis deviation  Incomplete right bundle branch block , plus right ventricular hypertrophy  Nonspecific T wave abnormality  Abnormal ECG  When compared with ECG of 01-MAY-2017 16:57,  Sinus rhythm has replaced Junctional rhythm  Borderline criteria for Inferior infarct are no longer present     CBC WITH AUTOMATED DIFF    Collection Time: 05/20/17  5:47 PM   Result Value Ref Range    WBC 5.3 4.1 - 11.1 K/uL    RBC 4.18 4.10 - 5.70 M/uL    HGB 13.2 12.1 - 17.0 g/dL    HCT 37.5 36.6 - 50.3 %    MCV 89.7 80.0 - 99.0 FL    MCH 31.6 26.0 - 34.0 PG    MCHC 35.2 30.0 - 36.5 g/dL    RDW 13.1 11.5 - 14.5 %    PLATELET 657 789 - 578 K/uL    NEUTROPHILS 64 32 - 75 %    LYMPHOCYTES 28 12 - 49 %    MONOCYTES 6 5 - 13 %    EOSINOPHILS 2 0 - 7 %    BASOPHILS 0 0 - 1 %    ABS. NEUTROPHILS 3.4 1.8 - 8.0 K/UL    ABS. LYMPHOCYTES 1.5 0.8 - 3.5 K/UL    ABS. MONOCYTES 0.3 0.0 - 1.0 K/UL    ABS. EOSINOPHILS 0.1 0.0 - 0.4 K/UL    ABS.  BASOPHILS 0.0 0.0 - 0.1 K/UL   METABOLIC PANEL, COMPREHENSIVE    Collection Time: 05/20/17  5:47 PM   Result Value Ref Range    Sodium 141 136 - 145 mmol/L    Potassium 4.2 3.5 - 5.1 mmol/L    Chloride 107 97 - 108 mmol/L    CO2 25 21 - 32 mmol/L    Anion gap 9 5 - 15 mmol/L    Glucose 96 65 - 100 mg/dL    BUN 28 (H) 6 - 20 MG/DL    Creatinine 1.23 0.70 - 1.30 MG/DL    BUN/Creatinine ratio 23 (H) 12 - 20      GFR est AA >60 >60 ml/min/1.73m2    GFR est non-AA >60 >60 ml/min/1.73m2    Calcium 8.4 (L) 8.5 - 10.1 MG/DL    Bilirubin, total 0.8 0.2 - 1.0 MG/DL    ALT (SGPT) 35 12 - 78 U/L    AST (SGOT) 23 15 - 37 U/L    Alk. phosphatase 82 45 - 117 U/L    Protein, total 7.1 6.4 - 8.2 g/dL    Albumin 3.9 3.5 - 5.0 g/dL    Globulin 3.2 2.0 - 4.0 g/dL    A-G Ratio 1.2 1.1 - 2.2     TROPONIN I    Collection Time: 05/20/17  5:47 PM   Result Value Ref Range    Troponin-I, Qt. <0.04 <0.05 ng/mL   CK W/ REFLX CKMB    Collection Time: 05/20/17  5:47 PM   Result Value Ref Range     39 - 308 U/L   PRO-BNP    Collection Time: 05/20/17  5:47 PM   Result Value Ref Range    NT pro- (H) 0 - 125 PG/ML         IMAGING RESULTS:  CTA CHEST W OR W WO CONT   Final Result      XR CHEST PA LAT   Final Result      INDICATION: Chest pain, shortness of breath.     CT pulmonary angiogram is performed with 2.5 mm collimation. 100 mL of nonionic  IV Isovue-370 was administered for exam. 3D coronal and sagittal postprocessed  images were performed for this examination.     CT dose reduction was achieved through use of a standardized protocol tailored  for this examination and automatic exposure control for dose modulation.     The pulmonary arteries are well opacified and there is no evidence of pulmonary  embolism. The thoracic aorta is normal in course and caliber and there is no  aortic dissection. The ascending thoracic aorta arises from the right ventricle. There is no axillary, mediastinal or hilar lymphadenopathy. The heart is normal  in the size and there is no pericardial effusion.  The pleural spaces are normal.  The lung parenchyma is clear bilaterally. The visualized upper abdominal  structures are normal.     IMPRESSION  IMPRESSION: No evidence pulmonary embolism. EXAM: XR CHEST PA LAT     INDICATION: Chest pain     COMPARISON: 5/1/2017 and 1/26/2017     FINDINGS: PA and lateral radiographs of the chest demonstrate clear lungs. The  cardiac and mediastinal contours and pulmonary vascularity are normal. The  bones and soft tissues are within normal limits. A pacemaker wire projects over  the left atrium unchanged in position. Is made of a pectus excavatum deformity.     IMPRESSION  IMPRESSION: No acute cardiopulmonary process seen. MEDICATIONS GIVEN:  Medications   diazePAM (VALIUM) injection 5 mg (not administered)   LORazepam (ATIVAN) injection 0.5 mg (0.5 mg IntraVENous Given 5/20/17 1817)   ondansetron (ZOFRAN) injection 4 mg (4 mg IntraVENous Given 5/20/17 1817)   ketorolac (TORADOL) injection 30 mg (30 mg IntraVENous Given 5/20/17 1817)   fentaNYL citrate (PF) injection 50 mcg (50 mcg IntraVENous Given 5/20/17 1841)   nitroglycerin (NITROBID) 2 % ointment 1 Inch (1 Inch Topical Given 5/20/17 1928)   morphine injection 4 mg (4 mg IntraVENous Given 5/20/17 1914)   morphine injection 4 mg (4 mg IntraVENous Given 5/20/17 2054)   0.9% sodium chloride infusion (50 mL/hr IntraVENous New Bag 5/20/17 2208)   iopamidol (ISOVUE-370) 76 % injection 100 mL (100 mL IntraVENous Given 5/20/17 2208)   sodium chloride (NS) flush 10 mL (10 mL IntraVENous Given 5/20/17 2208)   diphenhydrAMINE (BENADRYL) injection 50 mg (50 mg IntraVENous Given 5/20/17 2125)       IMPRESSION:  1. Atypical chest pain        PLAN:  1. Current Discharge Medication List      START taking these medications    Details   HYDROcodone-acetaminophen (NORCO) 5-325 mg per tablet Take 1 Tab by mouth every six (6) hours as needed for Pain. Max Daily Amount: 4 Tabs. Qty: 12 Tab, Refills: 0           2.    Follow-up Information     Follow up With Details Comments Contact Info    Provider Unknown  If symptoms worsen Patient not available to ask          Return to ED if worse     DISCHARGE NOTE  11:20 PM  The patient has been re-evaluated and is ready for discharge. Reviewed available results with patient. Counseled pt on diagnosis and care plan. Pt has expressed understanding, and all questions have been answered. Pt agrees with plan and agrees to F/U as recommended, or return to the ED if their sxs worsen. Discharge instructions have been provided and explained to the pt, along with reasons to return to the ED. This note is prepared by Mireya Bucio, acting as Scribe for Tiffanie Barajas M.D. Tiffanie Barajas M.D: The scribe's documentation has been prepared under my direction and personally reviewed by me in its entirety. I confirm that the note above accurately reflects all work, treatment, procedures, and medical decision making performed by me.

## 2017-05-21 LAB
ATRIAL RATE: 84 BPM
CALCULATED R AXIS, ECG10: -128 DEGREES
CALCULATED T AXIS, ECG11: 62 DEGREES
DIAGNOSIS, 93000: NORMAL
P-R INTERVAL, ECG05: 154 MS
Q-T INTERVAL, ECG07: 382 MS
QRS DURATION, ECG06: 104 MS
QTC CALCULATION (BEZET), ECG08: 451 MS
VENTRICULAR RATE, ECG03: 84 BPM

## 2017-05-21 NOTE — ED NOTES
Patient requesting to talk to charge nurse, nurse to the bedside and patient is upset and concerned about pain control. States that he has a follow up appointment at [facility] on Monday or Tuesday and that he is just needing to get some pain control, concerned about his heart and that no one has done anything, \"no EKG\" but there was an EKG done on arrival and was reviewed by the doctor. Patient is holding his right hand and states that his pain is in his left chest and radiating to his right arm, patient appears very anxious and the record show that he has been treated with Ativan, Lab results results reviewed with normal values with the patient, but he remains concerned about his uncontrolled pain. Spoke to Dr Gracie Corcoran and she has a plan to CTA chest to further work up the patient to rule out any other problems that may not have been discovered to this point in his care.  Patient was updated

## 2017-05-21 NOTE — ED NOTES
Pt requested Benadryl to be given prior to CTA due to itching problems from [place] [address]. CT called, pt ready.

## 2017-05-21 NOTE — DISCHARGE INSTRUCTIONS
Please the list of Pain Management Specialist below for your convenience:    Shay Sevilla M.D. [phone] Pain Management  Samina Smith M.D. [phone] Physical Medicine and Sharon Marie M.D. [phone] Physical Medicine and Hai Gentile M.D. [phone] Pain Management  Gordon Miguel M.D. [phone] Pain Management    Dr. Lesa Garcia, [address] [facility]                                         [place] [address], [address] , [address] [facility]                                     [place], [address] [facility]    [phone]       [facility] Center                            LILI Olsen MD DO    [address], [address], [address]    [phone]       Dr. Hammad Gannon. [address]                                     [place] [id number]                                                                                   Dr. Marlon Brasher, [facility] [address] [facility] Ilia Tijerina    [address] [facility]                                           [address] [facility], [address], [address]                                        [facility], [address]    [email or web address]                                            [phone]    [phone] Dr. Doron Pederson, [place] [address]                            [phone]    [place], [address]                         [phone]                                                       Dr. Wandy Tobar, [address] [facility]    21         Dr. Terrie Bach [address]    [place], [address] [facility]    [phone]    [facility]. [address] [place] [address], [address] [place] [address]    [phone]    [facility]  [email or web address]                  Chest Pain: Care Instructions  Your Care Instructions  There are many things that can cause chest pain. Some are not serious and will get better on their own in a few days. But some kinds of chest pain need more testing and treatment. Your doctor may have recommended a follow-up visit in the next 8 to 12 hours. If you are not getting better, you may need more tests or treatment. Even though your doctor has released you, you still need to watch for any problems. The doctor carefully checked you, but sometimes problems can develop later. If you have new symptoms or if your symptoms do not get better, get medical care right away. If you have worse or different chest pain or pressure that lasts more than 5 minutes or you passed out (lost consciousness), call 911 or seek other emergency help right away. A medical visit is only one step in your treatment.  Even if you feel better, you still need to do what your doctor recommends, such as going to all suggested follow-up appointments and taking medicines exactly as directed. This will help you recover and help prevent future problems. How can you care for yourself at home? · Rest until you feel better. · Take your medicine exactly as prescribed. Call your doctor if you think you are having a problem with your medicine. · Do not drive after taking a prescription pain medicine. When should you call for help? Call 911 if:  · You passed out (lost consciousness). · You have severe difficulty breathing. · You have symptoms of a heart attack. These may include:  ¨ Chest pain or pressure, or a strange feeling in your chest.  ¨ Sweating. ¨ Shortness of breath. ¨ Nausea or vomiting. ¨ Pain, pressure, or a strange feeling in your back, neck, jaw, or upper belly or in one or both shoulders or arms. ¨ Lightheadedness or sudden weakness. ¨ A fast or irregular heartbeat. After you call 911, the  may tell you to chew 1 adult-strength or 2 to 4 low-dose aspirin. Wait for an ambulance. Do not try to drive yourself. Call your doctor today if:  · You have any trouble breathing. · Your chest pain gets worse. · You are dizzy or lightheaded, or you feel like you may faint. · You are not getting better as expected. · You are having new or different chest pain. Where can you learn more? Go to http://mj-niels.info/. Enter A120 in the search box to learn more about \"Chest Pain: Care Instructions. \"  Current as of: May 27, 2016  Content Version: 11.2  © 3469-4113 [employer]. Care instructions adapted under license by [employer] (which disclaims liability or warranty for this information). If you have questions about a medical condition or this instruction, always ask your healthcare professional. [address] any warranty or liability for your use of this information.

## 2017-05-21 NOTE — ED NOTES
MD Clyde Chowdary verbally discharged patient. Education regarding prescriptions, discharge instructions and follow up provided. PT discharged with friend from ED to home at this time.

## 2017-09-25 ENCOUNTER — APPOINTMENT (OUTPATIENT)
Dept: CT IMAGING | Age: 32
End: 2017-09-25
Attending: EMERGENCY MEDICINE
Payer: COMMERCIAL

## 2017-09-25 ENCOUNTER — HOSPITAL ENCOUNTER (EMERGENCY)
Age: 32
Discharge: HOME | End: 2017-09-25
Attending: EMERGENCY MEDICINE
Payer: COMMERCIAL

## 2017-09-25 ENCOUNTER — APPOINTMENT (OUTPATIENT)
Dept: GENERAL RADIOLOGY | Age: 32
End: 2017-09-25
Attending: EMERGENCY MEDICINE
Payer: COMMERCIAL

## 2017-09-25 VITALS
WEIGHT: 155 LBS | SYSTOLIC BLOOD PRESSURE: 134 MMHG | HEIGHT: 67 IN | OXYGEN SATURATION: 98 % | HEART RATE: 84 BPM | BODY MASS INDEX: 24.33 KG/M2 | RESPIRATION RATE: 14 BRPM | TEMPERATURE: 97.7 F | DIASTOLIC BLOOD PRESSURE: 96 MMHG

## 2017-09-25 DIAGNOSIS — R07.89 OTHER CHEST PAIN: Primary | ICD-10-CM

## 2017-09-25 LAB
ALBUMIN SERPL-MCNC: 4.2 G/DL (ref 3.5–5)
ALBUMIN/GLOB SERPL: 1.2 {RATIO} (ref 1.1–2.2)
ALP SERPL-CCNC: 97 U/L (ref 45–117)
ALT SERPL-CCNC: 48 U/L (ref 12–78)
AMPHET UR QL SCN: POSITIVE
ANION GAP SERPL CALC-SCNC: 6 MMOL/L (ref 5–15)
AST SERPL-CCNC: 48 U/L (ref 15–37)
ATRIAL RATE: 84 BPM
BARBITURATES UR QL SCN: NEGATIVE
BASOPHILS # BLD: 0 K/UL (ref 0–0.1)
BASOPHILS NFR BLD: 1 % (ref 0–1)
BENZODIAZ UR QL: NEGATIVE
BILIRUB SERPL-MCNC: 0.4 MG/DL (ref 0.2–1)
BNP SERPL-MCNC: 459 PG/ML (ref 0–125)
BUN SERPL-MCNC: 17 MG/DL (ref 6–20)
BUN/CREAT SERPL: 13 (ref 12–20)
CALCIUM SERPL-MCNC: 8.2 MG/DL (ref 8.5–10.1)
CALCULATED P AXIS, ECG09: 105 DEGREES
CALCULATED R AXIS, ECG10: -170 DEGREES
CALCULATED T AXIS, ECG11: 31 DEGREES
CANNABINOIDS UR QL SCN: POSITIVE
CHLORIDE SERPL-SCNC: 109 MMOL/L (ref 97–108)
CK MB CFR SERPL CALC: 0.2 % (ref 0–2.5)
CK MB SERPL-MCNC: 1.8 NG/ML (ref 5–25)
CK SERPL-CCNC: 1022 U/L (ref 39–308)
CO2 SERPL-SCNC: 25 MMOL/L (ref 21–32)
COCAINE UR QL SCN: NEGATIVE
CREAT SERPL-MCNC: 1.28 MG/DL (ref 0.7–1.3)
DIAGNOSIS, 93000: NORMAL
DRUG SCRN COMMENT,DRGCM: ABNORMAL
EOSINOPHIL # BLD: 0.2 K/UL (ref 0–0.4)
EOSINOPHIL NFR BLD: 4 % (ref 0–7)
ERYTHROCYTE [DISTWIDTH] IN BLOOD BY AUTOMATED COUNT: 13.2 % (ref 11.5–14.5)
GLOBULIN SER CALC-MCNC: 3.6 G/DL (ref 2–4)
GLUCOSE SERPL-MCNC: 74 MG/DL (ref 65–100)
HCT VFR BLD AUTO: 41.8 % (ref 36.6–50.3)
HGB BLD-MCNC: 14.4 G/DL (ref 12.1–17)
LYMPHOCYTES # BLD: 1.1 K/UL (ref 0.8–3.5)
LYMPHOCYTES NFR BLD: 28 % (ref 12–49)
MCH RBC QN AUTO: 31.2 PG (ref 26–34)
MCHC RBC AUTO-ENTMCNC: 34.4 G/DL (ref 30–36.5)
MCV RBC AUTO: 90.7 FL (ref 80–99)
METHADONE UR QL: NEGATIVE
MONOCYTES # BLD: 0.4 K/UL (ref 0–1)
MONOCYTES NFR BLD: 10 % (ref 5–13)
NEUTS SEG # BLD: 2.3 K/UL (ref 1.8–8)
NEUTS SEG NFR BLD: 57 % (ref 32–75)
OPIATES UR QL: POSITIVE
P-R INTERVAL, ECG05: 214 MS
PCP UR QL: NEGATIVE
PLATELET # BLD AUTO: 167 K/UL (ref 150–400)
POTASSIUM SERPL-SCNC: 3.7 MMOL/L (ref 3.5–5.1)
PROT SERPL-MCNC: 7.8 G/DL (ref 6.4–8.2)
Q-T INTERVAL, ECG07: 408 MS
QRS DURATION, ECG06: 98 MS
QTC CALCULATION (BEZET), ECG08: 482 MS
RBC # BLD AUTO: 4.61 M/UL (ref 4.1–5.7)
SODIUM SERPL-SCNC: 140 MMOL/L (ref 136–145)
TROPONIN I SERPL-MCNC: <0.04 NG/ML
VENTRICULAR RATE, ECG03: 84 BPM
WBC # BLD AUTO: 4 K/UL (ref 4.1–11.1)

## 2017-09-25 PROCEDURE — 80307 DRUG TEST PRSMV CHEM ANLYZR: CPT | Performed by: EMERGENCY MEDICINE

## 2017-09-25 PROCEDURE — 99285 EMERGENCY DEPT VISIT HI MDM: CPT

## 2017-09-25 PROCEDURE — 96376 TX/PRO/DX INJ SAME DRUG ADON: CPT

## 2017-09-25 PROCEDURE — 96374 THER/PROPH/DIAG INJ IV PUSH: CPT

## 2017-09-25 PROCEDURE — 74011250636 HC RX REV CODE- 250/636: Performed by: EMERGENCY MEDICINE

## 2017-09-25 PROCEDURE — 82550 ASSAY OF CK (CPK): CPT | Performed by: EMERGENCY MEDICINE

## 2017-09-25 PROCEDURE — 71020 XR CHEST PA LAT: CPT

## 2017-09-25 PROCEDURE — 82553 CREATINE MB FRACTION: CPT | Performed by: EMERGENCY MEDICINE

## 2017-09-25 PROCEDURE — 80053 COMPREHEN METABOLIC PANEL: CPT | Performed by: EMERGENCY MEDICINE

## 2017-09-25 PROCEDURE — 93005 ELECTROCARDIOGRAM TRACING: CPT

## 2017-09-25 PROCEDURE — 83880 ASSAY OF NATRIURETIC PEPTIDE: CPT | Performed by: EMERGENCY MEDICINE

## 2017-09-25 PROCEDURE — 84484 ASSAY OF TROPONIN QUANT: CPT | Performed by: EMERGENCY MEDICINE

## 2017-09-25 PROCEDURE — 70450 CT HEAD/BRAIN W/O DYE: CPT

## 2017-09-25 PROCEDURE — 36415 COLL VENOUS BLD VENIPUNCTURE: CPT | Performed by: EMERGENCY MEDICINE

## 2017-09-25 PROCEDURE — 85025 COMPLETE CBC W/AUTO DIFF WBC: CPT | Performed by: EMERGENCY MEDICINE

## 2017-09-25 PROCEDURE — 96375 TX/PRO/DX INJ NEW DRUG ADDON: CPT

## 2017-09-25 RX ORDER — DEXTROAMPHETAMINE SACCHARATE, AMPHETAMINE ASPARTATE MONOHYDRATE, DEXTROAMPHETAMINE SULFATE AND AMPHETAMINE SULFATE 5; 5; 5; 5 MG/1; MG/1; MG/1; MG/1
20 CAPSULE, EXTENDED RELEASE ORAL DAILY
COMMUNITY
End: 2017-10-10 | Stop reason: SDUPTHER

## 2017-09-25 RX ORDER — ZOLPIDEM TARTRATE 5 MG/1
5 TABLET ORAL
COMMUNITY
End: 2017-11-27

## 2017-09-25 RX ORDER — ONDANSETRON 2 MG/ML
4 INJECTION INTRAMUSCULAR; INTRAVENOUS ONCE
Status: COMPLETED | OUTPATIENT
Start: 2017-09-25 | End: 2017-09-25

## 2017-09-25 RX ORDER — MORPHINE SULFATE 10 MG/ML
6 INJECTION, SOLUTION INTRAMUSCULAR; INTRAVENOUS
Status: COMPLETED | OUTPATIENT
Start: 2017-09-25 | End: 2017-09-25

## 2017-09-25 RX ORDER — MORPHINE SULFATE 2 MG/ML
4 INJECTION, SOLUTION INTRAMUSCULAR; INTRAVENOUS
Status: COMPLETED | OUTPATIENT
Start: 2017-09-25 | End: 2017-09-25

## 2017-09-25 RX ORDER — DIAZEPAM 5 MG/1
2.5 TABLET ORAL
COMMUNITY
End: 2017-10-10

## 2017-09-25 RX ADMIN — ONDANSETRON 4 MG: 2 INJECTION INTRAMUSCULAR; INTRAVENOUS at 10:49

## 2017-09-25 RX ADMIN — MORPHINE SULFATE 4 MG: 2 INJECTION, SOLUTION INTRAMUSCULAR; INTRAVENOUS at 13:56

## 2017-09-25 RX ADMIN — MORPHINE SULFATE 6 MG: 10 INJECTION, SOLUTION INTRAMUSCULAR; INTRAVENOUS at 11:48

## 2017-09-25 NOTE — PROGRESS NOTES
Admission Medication Reconciliation:    Information obtained from: Patient    Significant PMH/Disease States:   Past Medical History:   Diagnosis Date    Asthma     CAD (coronary artery disease)     Gout     Heart failure ([address]     with pacemaker, states transition of great vessels    Pacemaker     Stroke [facility])     Transposition great arteries        Chief Complaint for this Admission:  Chest pain    Allergies:  Betadine [povidone-iodine]    Prior to Admission Medications:   Prior to Admission Medications   Prescriptions Last Dose Informant Patient Reported? Taking?   furosemide (LASIX) 20 mg tablet 9/25/2017  Yes Yes   Sig: Take 20 mg by mouth two (2) times a day. lisinopril (PRINIVIL, ZESTRIL) 20 mg tablet 9/25/2017 at morning  Yes Yes   Sig: Take 20 mg by mouth daily. metoprolol succinate (TOPROL-XL) 50 mg XL tablet 9/25/2017 at morning  Yes Yes   Sig: Take 50 mg by mouth daily. ondansetron hcl (ZOFRAN, AS HYDROCHLORIDE,) 4 mg tablet 9/11/2017  No Yes   Sig: Take 1 Tab by mouth every eight (8) hours as needed for Nausea. spironolactone (ALDACTONE) 25 mg tablet 9/25/2017 at morning  Yes Yes   Sig: Take 25 mg by mouth daily. Facility-Administered Medications: None         Comments/Recommendations: Patient was in apparent pain but is a good historian. The patient claims that the last time he had to take Zofran was weeks ago.     Changed:  Metoprolol dose    Removed:  Allopurinol  Adderall  Aspirin  Valium  Norco  Lidocaine  Entresto    Tori Clement)  PharmD Candidate [id number]

## 2017-09-25 NOTE — DISCHARGE INSTRUCTIONS
Chest Pain: Care Instructions  Your Care Instructions  There are many things that can cause chest pain. Some are not serious and will get better on their own in a few days. But some kinds of chest pain need more testing and treatment. Your doctor may have recommended a follow-up visit in the next 8 to 12 hours. If you are not getting better, you may need more tests or treatment. Even though your doctor has released you, you still need to watch for any problems. The doctor carefully checked you, but sometimes problems can develop later. If you have new symptoms or if your symptoms do not get better, get medical care right away. If you have worse or different chest pain or pressure that lasts more than 5 minutes or you passed out (lost consciousness), call 911 or seek other emergency help right away. A medical visit is only one step in your treatment. Even if you feel better, you still need to do what your doctor recommends, such as going to all suggested follow-up appointments and taking medicines exactly as directed. This will help you recover and help prevent future problems. How can you care for yourself at home? · Rest until you feel better. · Take your medicine exactly as prescribed. Call your doctor if you think you are having a problem with your medicine. · Do not drive after taking a prescription pain medicine. When should you call for help? Call 911 if:  · You passed out (lost consciousness). · You have severe difficulty breathing. · You have symptoms of a heart attack. These may include:  ¨ Chest pain or pressure, or a strange feeling in your chest.  ¨ Sweating. ¨ Shortness of breath. ¨ Nausea or vomiting. ¨ Pain, pressure, or a strange feeling in your back, neck, jaw, or upper belly or in one or both shoulders or arms. ¨ Lightheadedness or sudden weakness. ¨ A fast or irregular heartbeat.   After you call 911, the  may tell you to chew 1 adult-strength or 2 to 4 low-dose aspirin. Wait for an ambulance. Do not try to drive yourself. Call your doctor today if:  · You have any trouble breathing. · Your chest pain gets worse. · You are dizzy or lightheaded, or you feel like you may faint. · You are not getting better as expected. · You are having new or different chest pain. Where can you learn more? Go to http://mj-niels.info/. Enter A120 in the search box to learn more about \"Chest Pain: Care Instructions. \"  Current as of: March 20, 2017  Content Version: 11.3  © 5079-2403 [employer]. Care instructions adapted under license by [employer] (which disclaims liability or warranty for this information). If you have questions about a medical condition or this instruction, always ask your healthcare professional. [address] any warranty or liability for your use of this information.

## 2017-09-25 NOTE — ED NOTES
I have reviewed discharge instructions with the patient. The patient verbalized understanding. Patient did not want to be discharged, became argumentative with staff. PA and Dr. Tasha Pickering went in to talk to the patient. Patient has all his discharge instructions. RN attempted to call a ride for the patient but he will not let staff arrange a ride for him.  States that \"he'll take care of it himself\"

## 2017-09-25 NOTE — ED TRIAGE NOTES
Patient arrives from work with c/o mid chest pain that radiates to his R arm and neck. Patient states he has hx of congenital heart disease and heart failure, which he is on a list for a heart transplant. Patients cardiologist is Dr Charbel Nieves. Patient states he was recently released to go back to work but today he had a syncopal episode, +LOC, and hit his back of his head on concrete.

## 2017-09-25 NOTE — ED PROVIDER NOTES
HPI Comments: The patient is a 33 yo  male with PMHx significant for asthma, CAD, heart failure and stroke presenting of chest pain. Pain began 1 week ago after he was released back to work. Pain has been gradually increasing over the week. Over the weekend and into today, the pain increased. Pain has been persistent through the week. Pain rated  10/10 sharp. The pt notes accompanying shortness of breath that is also not new. No nausea and vomiting. He states that his radiate to his right arm and through to his back. Pt has chronic history of chest pain and sees Dr. Radha Edwards of [facility]. He states he feels cramping in hands and legs. No abdominal vomiting, dysuria or frequency. Today, patient states he became dizzy and had a syncopal episode and struck his head. No vomiting, neck pain or back pain. No visual disturbances. He reports he called his cardiologist last week and informed of pain. He states he was to make an appointment for this week. No relief of pain with advil. Pt denies having cardiologist in [facility]. Pt asking for pain medication. Old chart reviewed from 5/20, 5/1/17  pt reporting he had  a cardiac catheterization done in May days ago that showed no blockages. Social hx  Nonsmoker  No alcohol    Patient is a 32 y.o. male presenting with chest pain. The history is provided by the patient. Chest Pain (Angina)    Associated symptoms include shortness of breath. Pertinent negatives include no abdominal pain, no back pain, no cough, no dizziness, no fever, no headaches, no nausea, no palpitations, no vomiting and no weakness. 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  No pertinent family history. Social History     Social History    Marital status:      Spouse name: N/A    Number of children: N/A    Years of education: N/A     Occupational History    Not on file. Social History Main Topics    Smoking status: Never Smoker    Smokeless tobacco: Never Used    Alcohol use No    Drug use: No    Sexual activity: Not on file     Other Topics Concern    Not on file     Social History Narrative         ALLERGIES: Betadine [povidone-iodine]    Review of Systems   Constitutional: Negative for chills and fever. HENT: Negative for congestion and rhinorrhea. Respiratory: Positive for shortness of breath. Negative for cough, chest tightness and wheezing. Cardiovascular: Positive for chest pain. Negative for palpitations and leg swelling. Gastrointestinal: Negative for abdominal pain, nausea and vomiting. Genitourinary: Negative for dysuria and hematuria. Musculoskeletal: Negative for back pain, neck pain and neck stiffness. Skin: Negative for color change and wound. Neurological: Negative for dizziness, weakness, light-headedness and headaches. All other systems reviewed and are negative. Vitals:    09/25/17 0954   BP: 135/86   Pulse: 85   Resp: 20   Temp: 97.5 °F (36.4 °C)   SpO2: 98%   Weight: 70.3 kg (155 lb)   Height: 5' 7\" (1.702 m)            Physical Exam   Constitutional: He is oriented to person, place, and time. He appears well-developed and well-nourished. No distress. HENT:   Head: Normocephalic and atraumatic. Right Ear: External ear normal.   Left Ear: External ear normal.   Eyes: Conjunctivae and EOM are normal. Pupils are equal, round, and reactive to light. Neck: Normal range of motion. Neck supple. No midline tenderness to palpation of cspine. Cardiovascular: Normal rate and regular rhythm. Pulmonary/Chest: Effort normal and breath sounds normal. No respiratory distress. He has no wheezes. He has no rales.    Abdominal: Soft. Bowel sounds are normal. He exhibits no distension and no mass. There is no tenderness. There is no rebound and no guarding. Abdomen soft and nontender to palpation. Musculoskeletal: Normal range of motion. He exhibits no edema or tenderness. No leg swelling, no calf tenderness. No palpable cords. 2+ dorsalis pedis bilaterally   Neurological: He is alert and oriented to person, place, and time. He exhibits normal muscle tone. Coordination normal.   Skin: Skin is warm and dry. No erythema. Psychiatric: He has a normal mood and affect. His behavior is normal. Judgment and thought content normal.   Nursing note and vitals reviewed. MDM  Number of Diagnoses or Management Options  Other chest pain:   Diagnosis management comments: 33 yo male presenting to ER for evaluation of chest pain. Hx significant for pacemaker, transposition of great vessel, CAD. Chest pain x 1 week, associated shortness of breath. No associated nausea or vomiting. Syncope today  Pt asking for pain medication and nausea medicine. Hx of same. Cardiologist in [place].  P: EKG, lab, chest xray, CT head. Consult his cardiologist at [facility], pacemaker interrogation  Will give zofran for nausea. Defer pain medicine until after head CT.    10:43 AM  Pt wishes to leave: \"i am in constant pain and would be better off at [facility] . \" Discussed with patient that we will have pacemaker interrogated and that pain medicine will be possible after head CT. He is in agreement with staying for evaluation. 1:45 PM  Discussed with Dr. Patricia Stephens , [facility] cardiology. He reviewed notes. Pt with recent normal cath. Pt does have hx of anxiety as well as chronic pain syndrome. Pt has been referred to pain management. Pt has anxiety. Pt next appointment in November. Reassuring with normal enzymes and unchanged EKG. 3:42 PM  Discussed with Dr. Enmanuel Kim Cardiology. He is familiar with patient.  Pt has had multiple syncopal episodes in past. Pt is noncompliant with treatment. He has had multiple negative evaluations. Pt felt to have large psychiatric component. With unchanged ekg, normal labs and normal vital signs. He feels patient is safe for discharge with followup at [facility]. Amount and/or Complexity of Data Reviewed  Discuss the patient with other providers: yes (ER attending-Jamila)    Patient Progress  Patient progress: stable    ED Course       Procedures      ED EKG interpretation:  Rhythm: paced; and regular . Rate (approx.): 85; Axis: normal; P wave: normal; QRS interval: normal ; ST/T wave: normal; no acute changes}; Other findings: unchanged from previous ekg 5/20. This EKG was interpreted by Ashlee Sanchez PA-C, Dr. Greyson Barber MD ED Provider. Pt has been seen and evaluated by attending physician who has reviewed lab work and imaging tests. He agrees with discharge and follow-up plan.

## 2017-09-25 NOTE — CONSULTS
Cardiology Consult Note    Assessment/Plan/Discussion:Cardiology Attending:     Patient seen earlier today and examined  and agree with Advance Practice Provider (ELKIN, NP,PA)  assessment and plans. Ellis Whaley is a 32 y.o. male   Syncope  chronic chest pain  Had hx of TGA with surgery  Has pacer  On exam no rales RRR no edema  Unfortunately we are not experienced at any of this congenital heart disease here   With full syncope fallen on his head, I would advise transfer to a tertiary referral facility  Like [facility], [facility] or [facility] ( his Cards) he refuses [facility], Dr Deisi Cho to evaluate for ER to ER transfer to Iggy Cerrato MD 2017           Patient Name: Ellis Whaley  : 1985 MRN: [id number]  Date: 2017  Time: 2:04 PM    Admit Diagnosis: There are no admission diagnoses documented for this encounter. Primary Cardiologist: Dr. Charbel Nieves, [facility]   Consulting Cardiologist: Teofilo Butler. OLGA Looney for Consult: syncope, CHF, transposition of great vessels    Requesting MD: MICHAEL Graham    HPI:  Ellis Whaley is a 32 y.o. 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 Pain worsened over past week after he was released back to work. Over the weekend and into today, the pain increased. Pain rated  10/10 sharp. The pt notes accompanying shortness of breath that is also not new. No nausea and vomiting. He states that the pain radiated to his right arm and through to his back. Pt has chronic history of chest pain and sees Dr. Jason Lundberg of [facility]. He states he feels cramping in hands and legs. No abdominal vomiting, dysuria or frequency. Today, patient states he became dizzy and had a syncopal episode and struck his head.  No vomiting, neck pain or back pain. No visual disturbances. He reports he called his cardiologist last week and informed of pain. He states he was to make an appointment for this week. No relief of pain with advil. Pt denies having cardiologist in [place]. Old chart reviewed from 5/20, 5/1/17  pt reporting he had  a cardiac catheterization done in May days ago that showed no blockages. ER PA stated that he spoke to [facility] and that pt also has PMH of CPS and anxiety. They also reported that pt had NL cath a few months ago and that pt is not on a transplant list.       Subjective:  Pt states that he has had sharp midsternal chest pain daily for last 5 years which has gradually intesified. States pain is worse when sitting up and in certain positions. No change with deep inspiration. States that the most comfortable position for him has been laying back in bed with feet elevated. Pain radiates to RUE. States that chest pain and SOB has worsened with less activity. Pt reports just returning back to work last week after period of being on short term disability. 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 Pt has had more SOB, chest pain and ankle edmea since returning to work and increased activity. Has had more insomnia and nightsweats for the past 4 months. Pt reports that he had his friend bring him to ER today after he was at work and became lightheaded when he went from sitting to standing position. States he \"fell out\", had LOC. States he had another episode of syncope over weekend when he was showering and passed out, woke up in shower. Denies tobacco use or any drug use. Assessment and Plan     1. Syncope:  Pacemaker interrogated, no events noted per report. 2. Chest pain, chronic. Troponin <0.04. Recent cardiac cath 2 months ago normal per [facility].     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 Pt with hx of congenital heart disease and mustard atrial switch surgery- now with syncope episode. Recommend transfer to tertiary care center for continued monitoring and evaluation. Review of Symptoms:  Pertinent items are noted in HPI. and subjective. Previous treatment/evaluation includes echocardiogram, cardiac catheterization and mustard atrial switch procedure . Cardiac risk factors: smoking/ tobacco exposure, male gender. 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 Current Outpatient Prescriptions   Medication Sig    ondansetron hcl (ZOFRAN, AS HYDROCHLORIDE,) 4 mg tablet Take 1 Tab by mouth every eight (8) hours as needed for Nausea.  lisinopril (PRINIVIL, ZESTRIL) 20 mg tablet Take 20 mg by mouth daily.  furosemide (LASIX) 20 mg tablet Take 20 mg by mouth two (2) times a day.  spironolactone (ALDACTONE) 25 mg tablet Take 25 mg by mouth daily.  metoprolol succinate (TOPROL-XL) 50 mg XL tablet Take 50 mg by mouth daily. Allergies   Allergen Reactions    Betadine [Povidone-Iodine] Rash      History reviewed. No pertinent family history.    Social History     Social History    Marital status:      Spouse name: N/A    Number of children: N/A    Years of education: N/A     Social History Main Topics    Smoking status: Never Smoker    Smokeless tobacco: Never Used    Alcohol use No    Drug use: No    Sexual activity: Not Asked     Other Topics Concern    None     Social History Narrative       Objective:    Physical Exam    Vitals:   Vitals:    09/25/17 0954 09/25/17 1315 BP: 135/86 (!) 138/93   Pulse: 85 85   Resp: 20 16   Temp: 97.5 °F (36.4 °C)    SpO2: 98% 100%   Weight: 70.3 kg (155 lb)    Height: 5' 7\" (1.702 m)        General:    Alert, cooperative, no distress, appears stated age. Neck:   Supple,no JVD. Back:     Symmetric,   Lungs:     Clear to auscultation bilaterally, no use of accessory muscles   Heart[de-identified]    Regular rate and rhythm, S1, S2 normal, no murmur, click, rub or gallop. Abdomen:     Soft, non-tender. Bowel sounds normal. No masses,  No      organomegaly. Extremities:   Extremities normal, atraumatic, no cyanosis or edema. Vascular:   Pulses - 2+   Skin:   Skin color normal. No rashes or lesions   Neurologic:  Alert, oriented, JURADO. Telemetry: 100% atrial paced at 80    ECG: Atrial-paced rhythm with prolonged AV conduction       Data Review:     Radiology:  CXR:IMPRESSION: No acute cardiopulmonary disease  CT head: 1. Areas of decreased attenuation in the right greater than left frontal white  matter appears to represent old encephalomalacia. Correlate clinically or with  any prior outside head imaging if available. 2. Otherwise no findings to suggest acute intracranial abnormality    Recent Labs      09/25/17   1000   TROIQ  <0.04     Recent Labs      09/25/17   1000   NA  140   K  3.7   CL  109*   CO2  25   BUN  17   CREA  1.28   GLU  74   CA  8.2*     Recent Labs      09/25/17   1000   WBC  4.0*   HGB  14.4   HCT  41.8   PLT  167     Recent Labs      09/25/17   1000   SGOT  48*   AP  97     No results for input(s): CHOL, LDLC in the last 72 hours. No lab exists for component: TGL, HDLC,  HBA1C  No results for input(s): CRP, TSH, TSHEXT in the last 72 hours. No lab exists for component: ESR    Thank you very much for this referral. I appreciate the opportunity to participate in this patient's care. I will follow along with above stated plan. Chata Tyson.  ANDREY Herrera         [facility] [address] [place] [address], [facility] [place] [phone]

## 2017-09-25 NOTE — ED NOTES
Redness and welps developed at the upper forearm area. Pt denies any itching or SOB. Informed PA, no new orders given.

## 2017-10-10 ENCOUNTER — OFFICE VISIT (OUTPATIENT)
Dept: FAMILY MEDICINE CLINIC | Age: 32
End: 2017-10-10

## 2017-10-10 VITALS
TEMPERATURE: 97 F | RESPIRATION RATE: 20 BRPM | HEIGHT: 67 IN | BODY MASS INDEX: 25.74 KG/M2 | OXYGEN SATURATION: 95 % | SYSTOLIC BLOOD PRESSURE: 109 MMHG | HEART RATE: 85 BPM | DIASTOLIC BLOOD PRESSURE: 66 MMHG | WEIGHT: 164 LBS

## 2017-10-10 DIAGNOSIS — F41.9 CHRONIC ANXIETY: ICD-10-CM

## 2017-10-10 DIAGNOSIS — Z86.73 HISTORY OF CVA (CEREBROVASCULAR ACCIDENT): ICD-10-CM

## 2017-10-10 DIAGNOSIS — Z87.74 H/O MUSTARD PROCEDURE: ICD-10-CM

## 2017-10-10 DIAGNOSIS — I50.810 RIGHT VENTRICULAR FAILURE (HCC): ICD-10-CM

## 2017-10-10 DIAGNOSIS — Q20.3 TRANSPOSITION GREAT ARTERIES: Primary | ICD-10-CM

## 2017-10-10 DIAGNOSIS — Z95.0 PACEMAKER: ICD-10-CM

## 2017-10-10 DIAGNOSIS — G89.29 OTHER CHRONIC PAIN: ICD-10-CM

## 2017-10-10 RX ORDER — HYDROCODONE BITARTRATE AND ACETAMINOPHEN 5; 325 MG/1; MG/1
1 TABLET ORAL
Qty: 12 TAB | Refills: 0 | Status: SHIPPED | OUTPATIENT
Start: 2017-10-10 | End: 2017-11-14

## 2017-10-10 RX ORDER — FUROSEMIDE 20 MG/1
20 TABLET ORAL 2 TIMES DAILY
Qty: 30 TAB | Refills: 1 | Status: SHIPPED | OUTPATIENT
Start: 2017-10-10 | End: 2018-02-18

## 2017-10-10 RX ORDER — SPIRONOLACTONE 25 MG/1
25 TABLET ORAL DAILY
Qty: 30 TAB | Refills: 1 | Status: SHIPPED | OUTPATIENT
Start: 2017-10-10

## 2017-10-10 RX ORDER — METOPROLOL SUCCINATE 50 MG/1
50 TABLET, EXTENDED RELEASE ORAL DAILY
Qty: 30 TAB | Refills: 1 | Status: SHIPPED | OUTPATIENT
Start: 2017-10-10 | End: 2017-12-05 | Stop reason: SDUPTHER

## 2017-10-10 RX ORDER — DEXTROAMPHETAMINE SACCHARATE, AMPHETAMINE ASPARTATE MONOHYDRATE, DEXTROAMPHETAMINE SULFATE AND AMPHETAMINE SULFATE 5; 5; 5; 5 MG/1; MG/1; MG/1; MG/1
20 CAPSULE, EXTENDED RELEASE ORAL DAILY
Qty: 30 CAP | Refills: 0 | Status: SHIPPED | OUTPATIENT
Start: 2017-10-10 | End: 2017-12-11

## 2017-10-10 NOTE — MR AVS SNAPSHOT
Visit Information Date & Time Provider Department Dept. Phone Encounter #  
 10/10/2017 11:00 AM Greer Duane, NP [place] [facility] [phone] [id number] Upcoming Health Maintenance Date Due Pneumococcal 19-64 Medium Risk (1 of 1 - PPSV23) 11/10/2004 DTaP/Tdap/Td series (1 - Tdap) 11/10/2006 INFLUENZA AGE 9 TO ADULT 8/1/2017 Allergies as of 10/10/2017  Review Complete On: 10/10/2017 By: Greer Duane, NP Severity Noted Reaction Type Reactions Betadine [Povidone-iodine]  03/19/2014    Rash Current Immunizations  Never Reviewed No immunizations on file. Not reviewed this visit You Were Diagnosed With   
  
 Codes Comments Transposition great arteries    -  Primary ICD-10-CM: Q20.3 ICD-9-CM: 745.10 Right ventricular failure ([facility] [address]     ICD-10-CM: I50.9 ICD-9-CM: 428.0 H/O Mustard procedure     ICD-10-CM: Z87.74 ICD-9-CM: V13.65 Pacemaker     ICD-10-CM: Z95.0 ICD-9-CM: V45.01 History of CVA (cerebrovascular accident)     ICD-10-CM: Z86.73 
ICD-9-CM: V12.54 Other chronic pain     ICD-10-CM: G89.29 ICD-9-CM: 338.29 Chronic anxiety     ICD-10-CM: F41.9 ICD-9-CM: 300.00 Vitals BP Pulse Temp Resp Height(growth percentile) Weight(growth percentile) 109/66 (BP 1 Location: Left arm, BP Patient Position: Sitting) 85 97 °F (36.1 °C) (Oral) 20 5' 7\" (1.702 m) 164 lb (74.4 kg) SpO2 BMI Smoking Status 95% 25.69 kg/m2 Never Smoker BMI and BSA Data Body Mass Index Body Surface Area  
 25.69 kg/m 2 1.88 m 2 Preferred Pharmacy Pharmacy Name Phone [id number] [phone] [facility] [phone] Your Updated Medication List  
  
   
This list is accurate as of: 10/10/17 12:19 PM.  Always use your most recent med list.  
  
  
  
  
 amphetamine-dextroamphetamine XR 20 mg XR capsule Commonly known as:  ADDERALL XR Take 1 Cap (20 mg total) by mouth daily. Max Daily Amount: 20 mg  
  
 furosemide 20 mg tablet Commonly known as:  LASIX Take 1 Tab by mouth two (2) times a day. HYDROcodone-acetaminophen 5-325 mg per tablet Commonly known as:  Juanetta Rasp Take 1 Tab by mouth every eight (8) hours as needed for Pain. Max Daily Amount: 3 Tabs. lisinopril 20 mg tablet Commonly known as:  Cheryl Victor Take 20 mg by mouth daily. metoprolol succinate 50 mg XL tablet Commonly known as:  TOPROL-XL Take 1 Tab by mouth daily. ondansetron hcl 4 mg tablet Commonly known as:  ZOFRAN (AS HYDROCHLORIDE) Take 1 Tab by mouth every eight (8) hours as needed for Nausea. spironolactone 25 mg tablet Commonly known as:  ALDACTONE Take 1 Tab by mouth daily. zolpidem 5 mg tablet Commonly known as:  AMBIEN Take 5 mg by mouth nightly. Prescriptions Printed Refills  
 amphetamine-dextroamphetamine XR (ADDERALL XR) 20 mg XR capsule 0 Sig: Take 1 Cap (20 mg total) by mouth daily. Max Daily Amount: 20 mg  
 Class: Print Route: Oral  
 HYDROcodone-acetaminophen (NORCO) 5-325 mg per tablet 0 Sig: Take 1 Tab by mouth every eight (8) hours as needed for Pain. Max Daily Amount: 3 Tabs. Class: Print Route: Oral  
  
Prescriptions Sent to Pharmacy Refills  
 spironolactone (ALDACTONE) 25 mg tablet 1 Sig: Take 1 Tab by mouth daily. Class: Normal  
 Pharmacy: [address] Ph #: [phone] Route: Oral  
 metoprolol succinate (TOPROL-XL) 50 mg XL tablet 1 Sig: Take 1 Tab by mouth daily. Class: Normal  
 Pharmacy: [address] Ph #: [phone] Route: Oral  
 furosemide (LASIX) 20 mg tablet 1 Sig: Take 1 Tab by mouth two (2) times a day. Class: Normal  
 Pharmacy: [place] [address], [address] [place] [address] #: [phone] Route: Oral  
  
We Performed the Following REFERRAL TO CARDIOLOGY [LFU53 Custom] Comments:  
 Patient needs to establish care in [place]. Has cards at [facility],  of Mustard Procedure in 1985. REFERRAL TO PALLIATIVE MEDICINE [WLL469 Custom] Comments:  
 Pt needs chronic pain medication for heart condition. REFERRAL TO PSYCHIATRY [REF91 Custom] Comments:  
 Patient has anxiety from chronic heart disease Referral Information Referral ID Referred By Referred To  
  
 [id number] Sonali Nunez MD   
   [place] [address] [address] Phone: [phone] Fax: [phone] Visits Status Start Date End Date 1 New Request 10/10/17 10/10/18 If your referral has a status of pending review or denied, additional information will be sent to support the outcome of this decision. Referral ID Referred By Referred To  
 [id number] Maria Calhoun MD  
   [address] [place] [address] Phone: [phone] Fax: [phone] Visits Status Start Date End Date 1 New Request 10/10/17 10/10/18 If your referral has a status of pending review or denied, additional information will be sent to support the outcome of this decision. Referral ID Referred By Referred To  
 [id number] Gabino Estrada NP  
   [facility] [address] Phone: [phone] Fax: [phone] Visits Status Start Date End Date 1 New Request 10/10/17 10/10/18 If your referral has a status of pending review or denied, additional information will be sent to support the outcome of this decision. Patient Instructions 1) Refill for 1 month for adderrall 20mg daily. Will need neuropsych eval to confirm ADHD dx. Cardiology will have to approve continued use. Establish care with cardiology (referral to Dr. Erik Grady) so they can act as liaison between [facility] and you. Referrals to [facility] (psych) for chronic anxiety - will take a few months to get an appt, so schedule now. [facility] refill #12, but will need palliative for pain control - (referral to Dr. Pippa Vieira, may have to switch this to chronic pain but will try Dr. Pippa Vieira first.) 2) try and eat healthy - when you are hungry, eat healthy choices - lean meats, chicken, fish, fruits and veggies No fast foods or fried foods. Introducing [facility]! Shannen Ruano introduces [employer] patient portal. Now you can access parts of your medical record, email your doctor's office, and request medication refills online. 1. In your internet browser, go to [email or web address] 2. Click on the First Time User? Click Here link in the Sign In box. You will see the New Member Sign Up page. 3. Enter your [employer] Access Code exactly as it appears below. You will not need to use this code after youve completed the sign-up process. If you do not sign up before the expiration date, you must request a new code. · [employer] Access Code: [email or web address] Expires: 12/24/2017  3:47 PM 
 
4. Enter the last four digits of your Social Security Number (xxxx) and Date of Birth (mm/dd/yyyy) as indicated and click Submit. You will be taken to the next sign-up page. 5. Create a [employer] ID. This will be your [employer] login ID and cannot be changed, so think of one that is secure and easy to remember. 6. Create a [employer] password. You can change your password at any time. 7. Enter your Password Reset Question and Answer. This can be used at a later time if you forget your password. 8. Enter your e-mail address.  You will receive e-mail notification when new information is available in [employer]. 9. Click Sign Up. You can now view and download portions of your medical record. 10. Click the Download Summary menu link to download a portable copy of your medical information. If you have questions, please visit the Frequently Asked Questions section of the [employer] website. Remember, [employer] is NOT to be used for urgent needs. For medical emergencies, dial 911. Now available from your iPhone and Android! Please provide this summary of care documentation to your next provider. Your primary care clinician is listed as PROVIDER UNKNOWN. If you have any questions after today's visit, please call [phone].

## 2017-10-10 NOTE — PROGRESS NOTES
S: Catherine Kilpatrick is a 32 y.o. male who presents for establish care, pain management, heart medication refill    Assessment/Plan:    1. Transposition great arteries, hx of Mustard Procedure (1985), Right ventricular failure, hx of CVA, pacemaker  -pt goes to [facility] for cardiology care but needs cardiologist in [place] for acute needs and to liaison with [place] team Yogesh Martin did work up on him, but referred him to [facility] for treatment due to hx of mustard procedure)  -currently on heart transplant list, SOB increasing, 6 ED visits for chest pain in 2017  -Taking lisinopril 20mg; lasix 20mg, metoprolol succinate XL 50mg, spironolactone 25mg daily  - Referral to Dr. Jane Ayon    2. Chronic pain, SOB  - Pt needs chronic pain medication for heart condition.  -Controlled Substance agreement signed, urine drug screen today;  -[place] #12 given  -Referral to Dr. Celena Lai (palliative)    3. Chronic anxiety due to heart condition  -Referral to [facility] for anxiety management and med recommendations    4. ADHD  -takes adderall XR 20mg for ADHD   -refill 30 days of adderall; pt to obtain dx of ADHD or new neuropsych eval;   -advised cannot refill adderall without proper documentation    Consulted with Dr. 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  Functionality - heart is pushing against chest cavity wall   SOB - any kind of exertion, getting worse   Will need a heart transplant eventually (states he has been on list since 2014 but keeps getting pushed down bc he is not as ill as other pts)  Stroke when he was young - on right side  Cardiology team at [facility]  - goes down 1-2x month; usually drives himself, but recently had friend drive him  Did have work up at [facility] [employer], but they wanted him to go to [facility] or [facility] where docs had expertise with mustard procedure  Needs a cardiology in [place] that can liaison with [employer];    Takes lisinopril 20mg; lasix 20mg, metoprolol succinate XL 50mg, spironolactone 25mg daily; has lisinopril but needs refills on all the others    Constant pain  \"elephant always sitting on chest\" kind of pain  On average, pain 6/10 pain and will go up to [facility] for pain - ran out last month and he states \"no one wants to refill it\"; he was advised to get a PCP to manage his care  has been taking Advil but doesn't help. Pain stays at 6/10  Takes zofran for nausea bc pain gets so bad causes nausea    ADHD - dx when he was a child, cardiologist approved XR form. Hasn't taken adderrall for 10 days bc he ran out. States if he can't find paperwork on dx, will go to [facility] for dx    No longer takes valium - not really depressed or occasionally anxious, anxiety more due to overall idea that he could die from heart attack at any moment  No longer takes ambien 5mg - but has not taken this for a couple months; now uses melantonin and healthy diet to help sleep      Advised pt he was complicated case and needs team of specialists. My role would be more to help navigate and coordinate care as symptoms or problems arise.      Social History:  Nutrition:  Hard time eating and doesn't have much appetite due to pain; supposed to follow heart healthy diet  Physical: can't do it, slowly over time has had to give it up   Social: lives alone, has 2 kids   Occupation: building [employer] with [address]: none  Drugs: none; has done edible marijuana in past for pain and nausea, which gives him some relief  Alcohol: none    Review of Systems:  - Constitutional Symptoms: no fevers, chills, weight loss  - Respiratory: + shortness of breath  - Gastrointestinal: no dysphagia or abdominal pain      PHQ over the last two weeks 10/10/2017   Little interest or pleasure in doing things Not at all   Feeling down, depressed or hopeless Not at all   Total Score PHQ 2 0        I reviewed the following:  Past Medical History:   Diagnosis Date    Asthma     CAD (coronary artery disease)     Gout     Heart failure ([facility] [address]     with pacemaker, states transition of great vessels    Pacemaker     Stroke [facility])     Transposition great arteries        Current Outpatient Prescriptions   Medication Sig Dispense Refill    amphetamine-dextroamphetamine XR (ADDERALL XR) 20 mg XR capsule Take 20 mg by mouth daily.  zolpidem (AMBIEN) 5 mg tablet Take 5 mg by mouth nightly.  ondansetron hcl (ZOFRAN, AS HYDROCHLORIDE,) 4 mg tablet Take 1 Tab by mouth every eight (8) hours as needed for Nausea. 20 Tab 0    lisinopril (PRINIVIL, ZESTRIL) 20 mg tablet Take 20 mg by mouth daily.  furosemide (LASIX) 20 mg tablet Take 20 mg by mouth two (2) times a day.  spironolactone (ALDACTONE) 25 mg tablet Take 25 mg by mouth daily.  metoprolol succinate (TOPROL-XL) 50 mg XL tablet Take 50 mg by mouth daily. Allergies   Allergen Reactions    Betadine [Povidone-Iodine] Rash       O: VS:   Visit Vitals    /66 (BP 1 Location: Left arm, BP Patient Position: Sitting)    Pulse 85    Temp 97 °F (36.1 °C) (Oral)    Resp 20    Ht 5' 7\" (1.702 m)    Wt 164 lb (74.4 kg)    SpO2 95%    BMI 25.69 kg/m2     Data Reviewed:  6 ED visits for chest pain in 2017  9/25/17 - urine drug = positive amphetamines, opiates, THC    GENERAL: [place] is in no acute distress. Non-toxic. Well nourished. Well developed. Appropriately groomed. HEAD:  Normocephalic. Atraumatic. Non tender sinuses x 4. RESP: Breath sounds are symmetrical bilaterally. Unlabored without SOB. Speaking in full sentences. Clear to auscultation bilaterally anteriorly and posteriorly. No wheezes. No rales or rhonchi. CV: normal rate. Regular rhythm. S1, S2 audible. No murmur noted. Click noted. NEURO:  awake, alert and oriented to person, place, and time and event. Clear speech. Sensation is intact to light touch bilaterally. Steady gait. SKIN: Skin is warm and dry.  Turgor is normal. No petechiae, no purpura, no rash. No cyanosis. PSYCH: appropriate behavior, dress and thought processes. Good eye contact. Clear and coherent speech. Full affect. Good insight.     ____________________________________________________________________  I spent >40 minutes face to face with patient with >50% of time spent in counseling and coordinating care  Patient education was done. Advised on nutrition, physical activity, weight management, tobacco, alcohol and safety. Counseling included discussion of diagnosis, differentials, treatment options, prescribed treatment, warning signs and follow up. Medication risks/benefits and interactions and alternatives discussed with patient.      Patient verbalized understanding and agreed to plan of care. Patient was given an after visit summary which included current diagnoses, medications and vital signs. Follow up 1 month.

## 2017-10-10 NOTE — Clinical Note
[facility] have referred Mr. Lara Weston to you to help with medication for his chronic anxiety due to his heart condition.

## 2017-10-10 NOTE — Clinical Note
Hi Dr. Evangelista Restrepo - I wanted to refer Mr. Rae Hurtado to you. He is a very interesting case, with Transposition great arteries, hx of Mustard Procedure (1985), Right ventricular failure, hx of CVA, and pacemaker. He goes to [facility] for his care, but needs a local cardiologist for acute management (he has been to the ED 6 times this year for chest pain).   Thanks,  Mendy Dyer

## 2017-10-10 NOTE — Clinical Note
Michel Geronimo - I wanted to refer this patient to you for pain managment. Although he is not your standard palliative patient, he is a 31 yo who has chronic heart dx, is on the transplant list and needs creative pain management. He has chronic anxiety and ADHD dx as well. Just let me know if this is a pt you would be willing to see.  Thanks, Yuki Severino

## 2017-10-10 NOTE — PATIENT INSTRUCTIONS
1) Refill for 1 month for adderrall 20mg daily. Will need neuropsych eval to confirm ADHD dx. Cardiology will have to approve continued use. Establish care with cardiology (referral to Dr. Radha Rhodes) so they can act as liaison between [address] Dr Kenneth Granda and you. Referrals to [facility] (psych) for chronic anxiety - will take a few months to get an appt, so schedule now. [place] refill #12, but will need palliative for pain control - (referral to Dr. Celena Lai, may have to switch this to chronic pain but will try Dr. Celena Lai first.)    2) try and eat healthy - when you are hungry, eat healthy choices - lean meats, chicken, fish, fruits and veggies  No fast foods or fried foods.

## 2017-10-20 ENCOUNTER — NURSE NAVIGATOR (OUTPATIENT)
Dept: PALLATIVE CARE | Age: 32
End: 2017-10-20

## 2017-10-20 NOTE — PROGRESS NOTES
[facility]  Nursing Note  [phone]  Fax [phone]  Name:  Azael Owen  YOB: 1985    Received outpatient Palliative Medicine referral from Vanita Nelson NP to see Aristeo Us for chronic pain management due to heart condition. Chart  reviewed. Pt s/p Mustard procedure in 1985 for transposition of great arteries. Pt is [facility] at [facility] cardiology by Dr. Don West. Pt is currently on the heart transplant list.      Dr. Leandra Seip reviewed pt's chart and determined that does not meet criteria for our clinic at this time. Our MD's do not manage chronic pain.   Nurse messaged referring provider and gave her two names pf pain management physicians that are listed in the Nurse Navigator resource book:  Dr. Stephanie Muhammad ([phone]) and Dr. Tim Barton ([phone])  DASHAWN Gonzalez, RN-BC  Clinical Referral Navigator  [phone]

## 2017-11-06 ENCOUNTER — OFFICE VISIT (OUTPATIENT)
Dept: BEHAVIORAL/MENTAL HEALTH CLINIC | Age: 32
End: 2017-11-06

## 2017-11-06 VITALS
DIASTOLIC BLOOD PRESSURE: 62 MMHG | WEIGHT: 159.6 LBS | BODY MASS INDEX: 24.19 KG/M2 | HEIGHT: 68 IN | SYSTOLIC BLOOD PRESSURE: 94 MMHG | HEART RATE: 85 BPM

## 2017-11-06 DIAGNOSIS — Z86.59 HISTORY OF ADHD: Primary | ICD-10-CM

## 2017-11-06 DIAGNOSIS — F06.4 ANXIETY DISORDER DUE TO MEDICAL CONDITION: ICD-10-CM

## 2017-11-06 RX ORDER — GABAPENTIN 300 MG/1
CAPSULE ORAL
Refills: 3 | COMMUNITY
Start: 2017-08-19 | End: 2017-11-27

## 2017-11-06 RX ORDER — MIRTAZAPINE 15 MG/1
15 TABLET, ORALLY DISINTEGRATING ORAL
Qty: 30 TAB | Refills: 0 | Status: SHIPPED | OUTPATIENT
Start: 2017-11-06 | End: 2017-11-27

## 2017-11-06 RX ORDER — HYDROXYZINE PAMOATE 25 MG/1
25 CAPSULE ORAL
Qty: 90 CAP | Refills: 0 | Status: SHIPPED | OUTPATIENT
Start: 2017-11-06 | End: 2017-11-20

## 2017-11-06 RX ORDER — CLONAZEPAM 0.5 MG/1
0.5 TABLET ORAL DAILY
Qty: 15 TAB | Refills: 0 | Status: SHIPPED | OUTPATIENT
Start: 2017-11-06 | End: 2017-11-27

## 2017-11-06 RX ORDER — CLONAZEPAM 0.5 MG/1
TABLET ORAL
COMMUNITY
Start: 2015-06-24 | End: 2017-11-06 | Stop reason: SDUPTHER

## 2017-11-06 NOTE — MR AVS SNAPSHOT
Visit Information Date & Time Provider Department Dept. Phone Encounter #  
 11/6/2017  9:00 AM [address] [place] [address], [id number] [place] [address] [address] [phone] [id number] Follow-up Instructions Return in about 4 weeks (around 12/4/2017). Upcoming Health Maintenance Date Due Pneumococcal 19-64 Medium Risk (1 of 1 - PPSV23) 11/10/2004 DTaP/Tdap/Td series (1 - Tdap) 11/10/2006 INFLUENZA AGE 9 TO ADULT 8/1/2017 Allergies as of 11/6/2017  Review Complete On: 11/6/2017 By: [address], NP Severity Noted Reaction Type Reactions Betadine [Povidone-iodine]  03/19/2014    Rash Current Immunizations  Never Reviewed No immunizations on file. Not reviewed this visit Vitals BP Pulse Height(growth percentile) Weight(growth percentile) BMI Smoking Status 94/62 (BP 1 Location: Left arm, BP Patient Position: Sitting) 85 5' 8\" (1.727 m) 159 lb 9.6 oz (72.4 kg) 24.27 kg/m2 Never Smoker Vitals History BMI and BSA Data Body Mass Index Body Surface Area  
 24.27 kg/m 2 1.86 m 2 Preferred Pharmacy Pharmacy Name Phone [id number] [facility] [phone] Your Updated Medication List  
  
   
This list is accurate as of: 11/6/17 10:06 AM.  Always use your most recent med list.  
  
  
  
  
 amphetamine-dextroamphetamine XR 20 mg XR capsule Commonly known as:  ADDERALL XR Take 1 Cap (20 mg total) by mouth daily. Max Daily Amount: 20 mg  
  
 clonazePAM 0.5 mg tablet Commonly known as:  KlonoPIN  
0.5 mg = 1 tab each dose, PO, bid, PRN: Anxiety  
  
 furosemide 20 mg tablet Commonly known as:  LASIX Take 1 Tab by mouth two (2) times a day.  
  
 gabapentin 300 mg capsule Commonly known as:  NEURONTIN  
TAKE 1 CAPSULE BY MOUTH ONCE DAILY, BUT CAN TAKE UP TO 3 CAPSULES DAILY. HYDROcodone-acetaminophen 5-325 mg per tablet Commonly known as:  [place] Take 1 Tab by mouth every eight (8) hours as needed for Pain. Max Daily Amount: 3 Tabs. lisinopril 20 mg tablet Commonly known as:  Elva November Take 20 mg by mouth daily. metoprolol succinate 50 mg XL tablet Commonly known as:  TOPROL-XL Take 1 Tab by mouth daily. ondansetron hcl 4 mg tablet Commonly known as:  ZOFRAN (AS HYDROCHLORIDE) Take 1 Tab by mouth every eight (8) hours as needed for Nausea. spironolactone 25 mg tablet Commonly known as:  ALDACTONE Take 1 Tab by mouth daily. zolpidem 5 mg tablet Commonly known as:  AMBIEN Take 5 mg by mouth nightly. Follow-up Instructions Return in about 4 weeks (around 12/4/2017). Introducing [facility] SERVICES! Romina Gong introduces [employer] patient portal. Now you can access parts of your medical record, email your doctor's office, and request medication refills online. 1. In your internet browser, go to [email or web address] 2. Click on the First Time User? Click Here link in the Sign In box. You will see the New Member Sign Up page. 3. Enter your [employer] Access Code exactly as it appears below. You will not need to use this code after youve completed the sign-up process. If you do not sign up before the expiration date, you must request a new code. · [employer] Access Code: [email or web address] Expires: 12/24/2017  2:47 PM 
 
4. Enter the last four digits of your Social Security Number (xxxx) and Date of Birth (mm/dd/yyyy) as indicated and click Submit. You will be taken to the next sign-up page. 5. Create a [employer] ID. This will be your [employer] login ID and cannot be changed, so think of one that is secure and easy to remember. 6. Create a [employer] password. You can change your password at any time. 7. Enter your Password Reset Question and Answer. This can be used at a later time if you forget your password. 8. Enter your e-mail address. You will receive e-mail notification when new information is available in [address] 9. Click Sign Up. You can now view and download portions of your medical record. 10. Click the Download Summary menu link to download a portable copy of your medical information. If you have questions, please visit the Frequently Asked Questions section of the [employer] website. Remember, [employer] is NOT to be used for urgent needs. For medical emergencies, dial 911. Now available from your iPhone and Android! Please provide this summary of care documentation to your next provider. Your primary care clinician is listed as Lucila Gonzalez. If you have any questions after today's visit, please call [phone].

## 2017-11-06 NOTE — PROGRESS NOTES
CHIEF COMPLAINT:  Sultana Gaston is a 32 y.o. male and was seen today to establish psychiatric care. \" Cardiologist referred me\". HPI:      Kaelyn Alegria reports the following psychiatric symptoms:  anxiety and ADHD. The symptoms have been present for years and are of moderate/high severity. The symptoms occur constantly. Associated symptoms include  agitation, anger outbursts, anxiety, anxiety attacks, avoidance of crowds, chronic pain, concern about health problems, difficulty sleeping, increased irritability, poor concentration and stressed at work. Pt reports that distraction helps symptoms. Pt reports being at work, pain and shortness of breath increase symptoms. Pt is here today to discuss a tx plan. Pts score on the PHQ scale was a 17. This indicates moderate/severe depression. Pts score on the HAM-A was 26 and this indicates moderate/severe anxiety. Pt scored positve on the ADHD self report scale (ASRS-V1.1). PAST HISTORY:  Psychiatric:  Past Psychiatric Hospitalization:  1x at [facility] for homicidal ideations due to anger after finding out his daughter had been sexually abused. Past Outpatient Providers: Dr. Lalo Brooks- 6 months to 1 year ago (stopped going due to insurance). Short-term therapist after d/c from [facility] inpatient. Past Psychiatric Medications: Zoloft (felt depressed with administration), Celexa (doesn't remember), Cymbalta (that was okay but didn't notice much difference), xanax, klonopin (most helpful for vaso spasms), vyvanse (didnt' work as well as adderall), adderall, and Ambien.    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    Right ventricular failure ([address] 10/11/2016    Wheezing 10/11/2016    History of DVT (deep vein thrombosis) 10/11/2016    H/O Mustard procedure 10/11/2016    History of CVA (cerebrovascular accident) 10/11/2016    Pacemaker 10/11/2016    Chronic pain 10/11/2016     Resolved Ambulatory Problems     Diagnosis Date Noted    No Resolved Ambulatory Problems     Past Medical History:   Diagnosis Date    Asthma     CAD (coronary artery disease)     Gout     [facility] failure [facility] [address] [facility]     Pacemaker     Stroke [facility])     Transposition great arteries      Substance Use:   Illicit: Denies current use. THC in the past. UDS + 9/2017 for THC. Caffeine: Denies   Nicotine: Denies  ETOH: Denies, in the past but cannot due to being on the transplant list.     Social History     Social History    Marital status:      Spouse name: N/A    Number of children: N/A    Years of education: N/A     Social History Main Topics    Smoking status: Never Smoker    Smokeless tobacco: Never Used      Comment: advised not to start    Alcohol use No    Drug use: No    Sexual activity: Yes     Partners: Female     Birth control/ protection: Condom     Other Topics Concern    None     Social History Narrative     Social:  Marital Status:  for 4 years. Good co-parenting relationship. Children: 2 children. 8year old boy and 10year old girl. Educational Level:  Graduated HS. One semester of college. Diagnosed with ADHD in childhood and was on medication tx since 8years of age. Work History: Selling cell phones, law firm (), [employer]. Currently at [employer] part time. Legal History: DUI at age 23years of age. Pertinent Childhood History: \"It was good\".  at age 3. Mother was  3 times. Parents were supportive. No siblings. Denies any childhood trauma. Hobbies: Traveling     Family:  No family history of mental or substance use history reported. Family history of medical problems reviewed. Mother: congential heart ds. Maternal Grandmother: congential heart ds.      MEDICATIONS:  Current Outpatient Prescriptions   Medication Sig Dispense Refill    gabapentin (NEURONTIN) 300 mg capsule TAKE 1 CAPSULE BY MOUTH ONCE DAILY, BUT CAN TAKE UP TO 3 CAPSULES DAILY.  3    mirtazapine (REMERON SOL-TAB) 15 mg disintegrating tablet Take 1 Tab by mouth nightly. 30 Tab 0    hydrOXYzine pamoate (VISTARIL) 25 mg capsule Take 1 Cap by mouth three (3) times daily as needed for Anxiety for up to 14 days. 90 Cap 0    clonazePAM (KLONOPIN) 0.5 mg tablet Take 1 Tab by mouth daily. Max Daily Amount: 0.5 mg. 15 Tab 0    amphetamine-dextroamphetamine XR (ADDERALL XR) 20 mg XR capsule Take 1 Cap (20 mg total) by mouth daily. Max Daily Amount: 20 mg 30 Cap 0    spironolactone (ALDACTONE) 25 mg tablet Take 1 Tab by mouth daily. 30 Tab 1    metoprolol succinate (TOPROL-XL) 50 mg XL tablet Take 1 Tab by mouth daily. 30 Tab 1    furosemide (LASIX) 20 mg tablet Take 1 Tab by mouth two (2) times a day. 30 Tab 1    HYDROcodone-acetaminophen (NORCO) 5-325 mg per tablet Take 1 Tab by mouth every eight (8) hours as needed for Pain. Max Daily Amount: 3 Tabs. 12 Tab 0    zolpidem (AMBIEN) 5 mg tablet Take 5 mg by mouth nightly.  ondansetron hcl (ZOFRAN, AS HYDROCHLORIDE,) 4 mg tablet Take 1 Tab by mouth every eight (8) hours as needed for Nausea. 20 Tab 0    lisinopril (PRINIVIL, ZESTRIL) 20 mg tablet Take 20 mg by mouth daily. ALLERGIES:  Allergies   Allergen Reactions    Betadine [Povidone-Iodine] Rash       REVIEW OF SYSTEMS:    Psychiatric:  ADHD, anxiety  Appetite:decreased   Sleep: poor with DIMS (difficulty initiating & maintaining sleep)- Ambien was beneficial.   Neuro: Past stroke, denies sz history, +HA's, +Migranes   GI: Denies N/V   CV: chronic chest pain     All other systems reviewed and are considered negative.     MENTAL STATUS EXAM:     Orientation oriented to time, place and person   Vital Signs (BP,Pulse, Temp) See below (reviewed)   Gait and Station Within normal limits   Abnormal Muscular Movements/Tone/Behavior No EPS, no Tardive Dyskinesia, no abnormal muscular movements; wnl tone   Relations cooperative   General Appearance:  casually dressed   Language No aphasia or dysarthria   Speech:  normal pitch and normal volume   Thought Processes logical, wnl rate of thoughts, good abstract reasoning and computation   Thought Associations circumstantial and goal directed   Thought Content free of delusions and free of hallucinations   Suicidal Ideations no plan  and no intention   Homicidal Ideations no plan  and no intention   Mood:  anxious and irritable   Affect:  euthymic and increased in intensity   Memory recent  adequate   Memory remote:  adequate   Concentration/Attention:  adequate   Fund of Knowledge Fair/average   Insight:  fair   Reliability fair   Judgment:  fair     SAFE-T ASSESSMENT:   Risk Factors: anxiety symptoms r/t to medical condition   Protective Factors/strengths: Children   Suicide Thoughts/Plans/Behaviors/Intent: Denies  Assessment of risk/intervention/follow up: Will f/u as needed. Access to guns: Denies     VITALS:     Visit Vitals    BP 94/62 (BP 1 Location: Left arm, BP Patient Position: Sitting)    Pulse 85    Ht 5' 8\" (1.727 m)    Wt 72.4 kg (159 lb 9.6 oz)    BMI 24.27 kg/m2       PERTINENT DATA:  No visits with results within 2 Day(s) from this visit.   Latest known visit with results is:    Admission on 09/25/2017, Discharged on 09/25/2017   Component Date Value Ref Range Status    Ventricular Rate 09/25/2017 84  BPM Final    Atrial Rate 09/25/2017 84  BPM Final    P-R Interval 09/25/2017 214  ms Final    QRS Duration 09/25/2017 98  ms Final    Q-T Interval 09/25/2017 408  ms Final    QTC Calculation (Bezet) 09/25/2017 482  ms Final    Calculated P Axis 09/25/2017 105  degrees Final    Calculated R Axis 09/25/2017 -170  degrees Final    Calculated T Axis 09/25/2017 31  degrees Final    Diagnosis 09/25/2017    Final                    Value:Atrial-paced rhythm with prolonged AV conduction  Incomplete right bundle branch block  Confirmed by Reyes William M.D., Rosalba Chow ([id number]) on 9/25/2017 5:29:32 PM      WBC 09/25/2017 4.0* 4.1 - 11.1 K/uL Final    RBC 09/25/2017 4.61  4.10 - 5.70 M/uL Final    HGB 09/25/2017 14.4  12.1 - 17.0 g/dL Final    HCT 09/25/2017 41.8  36.6 - 50.3 % Final    MCV 09/25/2017 90.7  80.0 - 99.0 FL Final    MCH 09/25/2017 31.2  26.0 - 34.0 PG Final    MCHC 09/25/2017 34.4  30.0 - 36.5 g/dL Final    RDW 09/25/2017 13.2  11.5 - 14.5 % Final    PLATELET 54/84/6724 694  150 - 400 K/uL Final    NEUTROPHILS 09/25/2017 57  32 - 75 % Final    LYMPHOCYTES 09/25/2017 28  12 - 49 % Final    MONOCYTES 09/25/2017 10  5 - 13 % Final    EOSINOPHILS 09/25/2017 4  0 - 7 % Final    BASOPHILS 09/25/2017 1  0 - 1 % Final    ABS. NEUTROPHILS 09/25/2017 2.3  1.8 - 8.0 K/UL Final    ABS. LYMPHOCYTES 09/25/2017 1.1  0.8 - 3.5 K/UL Final    ABS. MONOCYTES 09/25/2017 0.4  0.0 - 1.0 K/UL Final    ABS. EOSINOPHILS 09/25/2017 0.2  0.0 - 0.4 K/UL Final    ABS. BASOPHILS 09/25/2017 0.0  0.0 - 0.1 K/UL Final    Sodium 09/25/2017 140  136 - 145 mmol/L Final    Potassium 09/25/2017 3.7  3.5 - 5.1 mmol/L Final    Chloride 09/25/2017 109* 97 - 108 mmol/L Final    CO2 09/25/2017 25  21 - 32 mmol/L Final    Anion gap 09/25/2017 6  5 - 15 mmol/L Final    Glucose 09/25/2017 74  65 - 100 mg/dL Final    BUN 09/25/2017 17  6 - 20 MG/DL Final    Creatinine 09/25/2017 1.28  0.70 - 1.30 MG/DL Final    BUN/Creatinine ratio 09/25/2017 13  12 - 20   Final    GFR est AA 09/25/2017 >60  >60 ml/min/1.73m2 Final    GFR est non-AA 09/25/2017 >60  >60 ml/min/1.73m2 Final    Calcium 09/25/2017 8.2* 8.5 - 10.1 MG/DL Final    Bilirubin, total 09/25/2017 0.4  0.2 - 1.0 MG/DL Final    ALT (SGPT) 09/25/2017 48  12 - 78 U/L Final    AST (SGOT) 09/25/2017 48* 15 - 37 U/L Final    Alk.  phosphatase 09/25/2017 97  45 - 117 U/L Final    Protein, total 09/25/2017 7.8  6.4 - 8.2 g/dL Final    Albumin 09/25/2017 4.2  3.5 - 5.0 g/dL Final    Globulin 09/25/2017 3.6  2.0 - 4.0 g/dL Final    A-G Ratio 09/25/2017 1.2  1.1 - 2.2   Final    Troponin-I, Qt. 09/25/2017 <0.04  <0.05 ng/mL Final    CK 09/25/2017 1022* 39 - 308 U/L Final    NT pro-BNP 09/25/2017 459* 0 - 125 PG/ML Final    AMPHETAMINES 09/25/2017 POSITIVE* NEG   Final    BARBITURATES 09/25/2017 NEGATIVE   NEG   Final    BENZODIAZEPINES 09/25/2017 NEGATIVE   NEG   Final    COCAINE 09/25/2017 NEGATIVE   NEG   Final    METHADONE 09/25/2017 NEGATIVE   NEG   Final    OPIATES 09/25/2017 POSITIVE* NEG   Final    PCP(PHENCYCLIDINE) 09/25/2017 NEGATIVE   NEG   Final    THC (TH-CANNABINOL) 09/25/2017 POSITIVE* NEG   Final    Drug screen comment 09/25/2017 (NOTE)   Final    CK - MB 09/25/2017 1.8  <3.6 NG/ML Final    CK-MB Index 09/25/2017 0.2  0 - 2.5   Final       XR Results (most recent):    Results from Hospital Encounter encounter on 09/25/17   XR CHEST PA LAT   Narrative Indication:  shortness of breath,  chest pain     Exam: PA and lateral views of the chest.    Direct comparison is made to prior CXR dated 5/2017. Findings: Cardiomediastinal silhouette is within normal limits. Intact pacer  lead is unchanged in position. Lungs are clear bilaterally. Pleural spaces are  normal. Osseous structures are intact. Impression IMPRESSION: No acute cardiopulmonary disease. MEDICAL DECISION MAKING:  Problems addressed today:     ICD-10-CM ICD-9-CM    1. History of ADHD Z86.59 V11.8    2. 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 male that presents with depression, anxiety and ADHD symptoms. Pt reports that anxiety symptoms are r/t to cardiac process. Will start vistaril 25mg-50mg TID for anxiety/panic attacks that are mild/moderate in nature. In addition, will start klonopin 0.5mg PRN daily for anxiety/panic attacks that are moderate/severe in nature. Discussed the safety implications with BNZ use and patient is interested in BNZ use for treatment of anxiety/panic symptoms.  Pt denies depressive symptoms although PHQ reflects moderate/severe depression. Discussed how symptoms of depression present differently. Today will start remeron 15mg for tx of depression/anxiety/sleep difficulty. Discussed starting medications that are safe in patients with cardiac conditions. Pt reports a long standing hx of ADHD symptoms that were diagnosed in childhood. Discussed the safety implication with prescribing adderall with a cardiac condition. Pt verbalized understanding and reports that his cardiologist has recommended XR adderall vs IR. Discussed consulting with cardiology to verify safety of adderall usage. In addition, pt will try to obtain neuro psych testing from childhood from [place]. Discussed the importance of therapy to help tx symptoms. Patient resistant to individual therapy at this time. Will encourage participation for these recommendations in future visits.  reviewed and obtained and reflects patient report. Provided support and encouragement. Total encounter time was 60 minutes; >50% of time was spent counseling/coordinating care regarding depression/anxiety/sleep and ADHD symptoms and tx. Plan:   1. Medication:      Current Outpatient Prescriptions   Medication Sig Dispense Refill    gabapentin (NEURONTIN) 300 mg capsule TAKE 1 CAPSULE BY MOUTH ONCE DAILY, BUT CAN TAKE UP TO 3 CAPSULES DAILY. 3    mirtazapine (REMERON SOL-TAB) 15 mg disintegrating tablet Take 1 Tab by mouth nightly. 30 Tab 0    hydrOXYzine pamoate (VISTARIL) 25 mg capsule Take 1 Cap by mouth three (3) times daily as needed for Anxiety for up to 14 days. 90 Cap 0    clonazePAM (KLONOPIN) 0.5 mg tablet Take 1 Tab by mouth daily. Max Daily Amount: 0.5 mg. 15 Tab 0    amphetamine-dextroamphetamine XR (ADDERALL XR) 20 mg XR capsule Take 1 Cap (20 mg total) by mouth daily. Max Daily Amount: 20 mg 30 Cap 0    spironolactone (ALDACTONE) 25 mg tablet Take 1 Tab by mouth daily. 30 Tab 1    metoprolol succinate (TOPROL-XL) 50 mg XL tablet Take 1 Tab by mouth daily. 30 Tab 1    furosemide (LASIX) 20 mg tablet Take 1 Tab by mouth two (2) times a day. 30 Tab 1    HYDROcodone-acetaminophen (NORCO) 5-325 mg per tablet Take 1 Tab by mouth every eight (8) hours as needed for Pain. Max Daily Amount: 3 Tabs. 12 Tab 0    zolpidem (AMBIEN) 5 mg tablet Take 5 mg by mouth nightly.  ondansetron hcl (ZOFRAN, AS HYDROCHLORIDE,) 4 mg tablet Take 1 Tab by mouth every eight (8) hours as needed for Nausea. 20 Tab 0    lisinopril (PRINIVIL, ZESTRIL) 20 mg tablet Take 20 mg by mouth daily. Medication changes made today: start remeron 15mg for anxiety/depression/sleep, start vistaril 25mg TID for anxiety/sleep, start klonopin 0.5mg daily PRN for anxiety/panic attacks. 2. Counseling and coordination of care including instructions for treatment, risks/benefits, risk factor reduction and patient/family education. He agrees with the plan. Patient instructed to call with any side effects, questions or issues. 3. Collateral information  4. Individual therapy   5. Monitor VS and appropriate labs  6. Request records (neuro-psych testing)      Follow-up Disposition:  Return in about 3 weeks (around 11/27/2017).     11/6/2017  Shyla Aleman NP

## 2017-11-07 ENCOUNTER — TELEPHONE (OUTPATIENT)
Dept: BEHAVIORAL/MENTAL HEALTH CLINIC | Age: 32
End: 2017-11-07

## 2017-11-07 NOTE — TELEPHONE ENCOUNTER
Called patient back re: adderall. Discussed his case with medical director Dr. Champ Ozuna and due to heart condition we don't feel comfortable prescribing something that could be problematic for his heart condition. Pt did not answer call back.

## 2017-11-09 ENCOUNTER — TELEPHONE (OUTPATIENT)
Dept: BEHAVIORAL/MENTAL HEALTH CLINIC | Age: 32
End: 2017-11-09

## 2017-11-10 ENCOUNTER — TELEPHONE (OUTPATIENT)
Dept: BEHAVIORAL/MENTAL HEALTH CLINIC | Age: 32
End: 2017-11-10

## 2017-11-10 NOTE — TELEPHONE ENCOUNTER
Called patient back to discuss the safety implication re: prescribing stimulants with severe cardiac ds. Discussed with medical director and due to high risk of causing an increase in cardiac symptoms, will not be able to prescribe a stimulant. Called to discuss the importance of tx of anxiety symptoms to help in decreasing ADHD symptoms. Called to discuss using fish oil for ADHD symptoms. Pt did not answer call back.

## 2017-11-10 NOTE — TELEPHONE ENCOUNTER
Patient returned call. Informed him after consulting with another provider (cross trained in cardiology and psychiatry), it is considered high risk and ill-advised to prescribe a stimulant when there is a cardiac issue. Informed him his anxiety needs to be treated, he responded he didn't care about that because he's had anxiety his whole life. Patient upset, stated he's been taking it his whole life, and his other doctor said it was okay. Recommended patient request medication from that provider. Patient stated he will look for another provider to prescribe the medication. No follow up scheduled at this time.

## 2017-11-14 ENCOUNTER — HOSPITAL ENCOUNTER (EMERGENCY)
Age: 32
Discharge: HOME | End: 2017-11-14
Attending: EMERGENCY MEDICINE
Payer: COMMERCIAL

## 2017-11-14 ENCOUNTER — APPOINTMENT (OUTPATIENT)
Dept: ULTRASOUND IMAGING | Age: 32
End: 2017-11-14
Attending: EMERGENCY MEDICINE
Payer: COMMERCIAL

## 2017-11-14 ENCOUNTER — OFFICE VISIT (OUTPATIENT)
Dept: FAMILY MEDICINE CLINIC | Age: 32
End: 2017-11-14

## 2017-11-14 ENCOUNTER — APPOINTMENT (OUTPATIENT)
Dept: GENERAL RADIOLOGY | Age: 32
End: 2017-11-14
Attending: EMERGENCY MEDICINE
Payer: COMMERCIAL

## 2017-11-14 VITALS
HEIGHT: 67 IN | RESPIRATION RATE: 19 BRPM | HEART RATE: 85 BPM | DIASTOLIC BLOOD PRESSURE: 68 MMHG | TEMPERATURE: 98.1 F | BODY MASS INDEX: 25.11 KG/M2 | SYSTOLIC BLOOD PRESSURE: 100 MMHG | OXYGEN SATURATION: 98 % | WEIGHT: 160 LBS

## 2017-11-14 VITALS
WEIGHT: 172.7 LBS | HEIGHT: 68 IN | RESPIRATION RATE: 20 BRPM | SYSTOLIC BLOOD PRESSURE: 104 MMHG | HEART RATE: 84 BPM | BODY MASS INDEX: 26.18 KG/M2 | TEMPERATURE: 97.4 F | OXYGEN SATURATION: 96 % | DIASTOLIC BLOOD PRESSURE: 60 MMHG

## 2017-11-14 DIAGNOSIS — R06.02 SOB (SHORTNESS OF BREATH): ICD-10-CM

## 2017-11-14 DIAGNOSIS — R07.89 OTHER CHEST PAIN: Primary | ICD-10-CM

## 2017-11-14 DIAGNOSIS — R07.9 ACUTE CHEST PAIN: Primary | ICD-10-CM

## 2017-11-14 DIAGNOSIS — R10.13 ABDOMINAL PAIN, EPIGASTRIC: ICD-10-CM

## 2017-11-14 LAB
ALBUMIN SERPL-MCNC: 3.8 G/DL (ref 3.5–5)
ALBUMIN/GLOB SERPL: 1.3 {RATIO} (ref 1.1–2.2)
ALP SERPL-CCNC: 85 U/L (ref 45–117)
ALT SERPL-CCNC: 70 U/L (ref 12–78)
ANION GAP SERPL CALC-SCNC: 8 MMOL/L (ref 5–15)
AST SERPL-CCNC: 39 U/L (ref 15–37)
BASOPHILS # BLD: 0 K/UL (ref 0–0.1)
BASOPHILS NFR BLD: 0 % (ref 0–1)
BILIRUB SERPL-MCNC: 0.2 MG/DL (ref 0.2–1)
BUN SERPL-MCNC: 18 MG/DL (ref 6–20)
BUN/CREAT SERPL: 15 (ref 12–20)
CALCIUM SERPL-MCNC: 8 MG/DL (ref 8.5–10.1)
CHLORIDE SERPL-SCNC: 109 MMOL/L (ref 97–108)
CK MB CFR SERPL CALC: NORMAL % (ref 0–2.5)
CK MB SERPL-MCNC: <1 NG/ML (ref 5–25)
CK SERPL-CCNC: 95 U/L (ref 39–308)
CO2 SERPL-SCNC: 24 MMOL/L (ref 21–32)
CREAT SERPL-MCNC: 1.2 MG/DL (ref 0.7–1.3)
D DIMER PPP FEU-MCNC: 0.3 MG/L FEU (ref 0–0.65)
EOSINOPHIL # BLD: 0.2 K/UL (ref 0–0.4)
EOSINOPHIL NFR BLD: 2 % (ref 0–7)
ERYTHROCYTE [DISTWIDTH] IN BLOOD BY AUTOMATED COUNT: 13.3 % (ref 11.5–14.5)
GLOBULIN SER CALC-MCNC: 3 G/DL (ref 2–4)
GLUCOSE SERPL-MCNC: 88 MG/DL (ref 65–100)
HCT VFR BLD AUTO: 35.7 % (ref 36.6–50.3)
HGB BLD-MCNC: 12 G/DL (ref 12.1–17)
LIPASE SERPL-CCNC: 176 U/L (ref 73–393)
LYMPHOCYTES # BLD: 1.7 K/UL (ref 0.8–3.5)
LYMPHOCYTES NFR BLD: 24 % (ref 12–49)
MCH RBC QN AUTO: 30.1 PG (ref 26–34)
MCHC RBC AUTO-ENTMCNC: 33.6 G/DL (ref 30–36.5)
MCV RBC AUTO: 89.5 FL (ref 80–99)
MONOCYTES # BLD: 0.5 K/UL (ref 0–1)
MONOCYTES NFR BLD: 7 % (ref 5–13)
NEUTS SEG # BLD: 4.6 K/UL (ref 1.8–8)
NEUTS SEG NFR BLD: 67 % (ref 32–75)
PLATELET # BLD AUTO: 150 K/UL (ref 150–400)
POTASSIUM SERPL-SCNC: 3.6 MMOL/L (ref 3.5–5.1)
PROT SERPL-MCNC: 6.8 G/DL (ref 6.4–8.2)
RBC # BLD AUTO: 3.99 M/UL (ref 4.1–5.7)
SODIUM SERPL-SCNC: 141 MMOL/L (ref 136–145)
TROPONIN I SERPL-MCNC: <0.04 NG/ML
WBC # BLD AUTO: 6.9 K/UL (ref 4.1–11.1)

## 2017-11-14 PROCEDURE — 36415 COLL VENOUS BLD VENIPUNCTURE: CPT | Performed by: EMERGENCY MEDICINE

## 2017-11-14 PROCEDURE — 80053 COMPREHEN METABOLIC PANEL: CPT | Performed by: EMERGENCY MEDICINE

## 2017-11-14 PROCEDURE — 74011000250 HC RX REV CODE- 250: Performed by: EMERGENCY MEDICINE

## 2017-11-14 PROCEDURE — 93005 ELECTROCARDIOGRAM TRACING: CPT

## 2017-11-14 PROCEDURE — 85379 FIBRIN DEGRADATION QUANT: CPT | Performed by: EMERGENCY MEDICINE

## 2017-11-14 PROCEDURE — 83690 ASSAY OF LIPASE: CPT | Performed by: EMERGENCY MEDICINE

## 2017-11-14 PROCEDURE — 74011250636 HC RX REV CODE- 250/636: Performed by: EMERGENCY MEDICINE

## 2017-11-14 PROCEDURE — 76700 US EXAM ABDOM COMPLETE: CPT

## 2017-11-14 PROCEDURE — 85025 COMPLETE CBC W/AUTO DIFF WBC: CPT | Performed by: EMERGENCY MEDICINE

## 2017-11-14 PROCEDURE — 96376 TX/PRO/DX INJ SAME DRUG ADON: CPT

## 2017-11-14 PROCEDURE — 74020 XR ABD FLAT/ ERECT: CPT

## 2017-11-14 PROCEDURE — 71010 XR CHEST PORT: CPT

## 2017-11-14 PROCEDURE — 84484 ASSAY OF TROPONIN QUANT: CPT | Performed by: EMERGENCY MEDICINE

## 2017-11-14 PROCEDURE — 82550 ASSAY OF CK (CPK): CPT | Performed by: EMERGENCY MEDICINE

## 2017-11-14 PROCEDURE — 96361 HYDRATE IV INFUSION ADD-ON: CPT

## 2017-11-14 PROCEDURE — 99285 EMERGENCY DEPT VISIT HI MDM: CPT

## 2017-11-14 PROCEDURE — 96375 TX/PRO/DX INJ NEW DRUG ADDON: CPT

## 2017-11-14 PROCEDURE — 96374 THER/PROPH/DIAG INJ IV PUSH: CPT

## 2017-11-14 RX ORDER — HYDROMORPHONE HYDROCHLORIDE 1 MG/ML
0.2 INJECTION, SOLUTION INTRAMUSCULAR; INTRAVENOUS; SUBCUTANEOUS
Status: COMPLETED | OUTPATIENT
Start: 2017-11-14 | End: 2017-11-14

## 2017-11-14 RX ORDER — FAMOTIDINE 20 MG/1
20 TABLET, FILM COATED ORAL 2 TIMES DAILY
Qty: 20 TAB | Refills: 0 | Status: SHIPPED | OUTPATIENT
Start: 2017-11-14 | End: 2017-11-27

## 2017-11-14 RX ORDER — DIPHENHYDRAMINE HYDROCHLORIDE 50 MG/ML
50 INJECTION, SOLUTION INTRAMUSCULAR; INTRAVENOUS
Status: COMPLETED | OUTPATIENT
Start: 2017-11-14 | End: 2017-11-14

## 2017-11-14 RX ORDER — ONDANSETRON 2 MG/ML
4 INJECTION INTRAMUSCULAR; INTRAVENOUS
Status: COMPLETED | OUTPATIENT
Start: 2017-11-14 | End: 2017-11-14

## 2017-11-14 RX ORDER — HYDROMORPHONE HYDROCHLORIDE 1 MG/ML
0.2 INJECTION, SOLUTION INTRAMUSCULAR; INTRAVENOUS; SUBCUTANEOUS ONCE
Status: COMPLETED | OUTPATIENT
Start: 2017-11-14 | End: 2017-11-14

## 2017-11-14 RX ORDER — ONDANSETRON 4 MG/1
4 TABLET, ORALLY DISINTEGRATING ORAL
Qty: 10 TAB | Refills: 0 | Status: SHIPPED | OUTPATIENT
Start: 2017-11-14 | End: 2018-06-18 | Stop reason: ALTCHOICE

## 2017-11-14 RX ORDER — HYDROXYZINE 25 MG/1
TABLET, FILM COATED ORAL
COMMUNITY
End: 2017-11-27

## 2017-11-14 RX ORDER — HYDROCODONE BITARTRATE AND ACETAMINOPHEN 5; 325 MG/1; MG/1
1 TABLET ORAL
Qty: 20 TAB | Refills: 0 | Status: SHIPPED | OUTPATIENT
Start: 2017-11-14 | End: 2017-11-27

## 2017-11-14 RX ADMIN — HYDROMORPHONE HYDROCHLORIDE 0.2 MG: 1 INJECTION, SOLUTION INTRAMUSCULAR; INTRAVENOUS; SUBCUTANEOUS at 20:03

## 2017-11-14 RX ADMIN — DIPHENHYDRAMINE HYDROCHLORIDE 50 MG: 50 INJECTION, SOLUTION INTRAMUSCULAR; INTRAVENOUS at 18:55

## 2017-11-14 RX ADMIN — ONDANSETRON 4 MG: 2 INJECTION INTRAMUSCULAR; INTRAVENOUS at 18:55

## 2017-11-14 RX ADMIN — HYDROMORPHONE HYDROCHLORIDE 0.2 MG: 1 INJECTION, SOLUTION INTRAMUSCULAR; INTRAVENOUS; SUBCUTANEOUS at 21:31

## 2017-11-14 RX ADMIN — FAMOTIDINE 20 MG: 10 INJECTION, SOLUTION INTRAVENOUS at 20:03

## 2017-11-14 RX ADMIN — SODIUM CHLORIDE 1000 ML: 900 INJECTION, SOLUTION INTRAVENOUS at 20:05

## 2017-11-14 RX ADMIN — HYDROMORPHONE HYDROCHLORIDE 0.2 MG: 1 INJECTION, SOLUTION INTRAMUSCULAR; INTRAVENOUS; SUBCUTANEOUS at 18:59

## 2017-11-14 RX ADMIN — SODIUM CHLORIDE 1000 ML: 900 INJECTION, SOLUTION INTRAVENOUS at 18:54

## 2017-11-14 NOTE — MR AVS SNAPSHOT
Visit Information Date & Time Provider Department Dept. Phone Encounter #  
 11/14/2017  4:20 PM Dede Gill NP [facility] [phone] [id number] Upcoming Health Maintenance Date Due Pneumococcal 19-64 Medium Risk (1 of 1 - PPSV23) 11/10/2004 DTaP/Tdap/Td series (1 - Tdap) 11/10/2006 Influenza Age 5 to Adult 8/1/2017 Allergies as of 11/14/2017  Review Complete On: 11/14/2017 By: Barrera Abdi LPN Severity Noted Reaction Type Reactions Betadine [Povidone-iodine]  03/19/2014    Rash Current Immunizations  Never Reviewed No immunizations on file. Not reviewed this visit You Were Diagnosed With   
  
 Codes Comments Other chest pain    -  Primary ICD-10-CM: R07.89 ICD-9-CM: 786.59   
 SOB (shortness of breath)     ICD-10-CM: R06.02 
ICD-9-CM: 786.05 Vitals BP Pulse Temp Resp Height(growth percentile) Weight(growth percentile) 104/60 (BP 1 Location: Right arm, BP Patient Position: Sitting) 84 97.4 °F (36.3 °C) (Oral) 20 5' 8\" (1.727 m) 172 lb 11.2 oz (78.3 kg) SpO2 BMI Smoking Status 96% 26.26 kg/m2 Never Smoker BMI and BSA Data Body Mass Index Body Surface Area  
 26.26 kg/m 2 1.94 m 2 Preferred Pharmacy Pharmacy Name Phone [phone] [facility] [phone] Your Updated Medication List  
  
   
This list is accurate as of: 11/14/17  5:11 PM.  Always use your most recent med list.  
  
  
  
  
 amphetamine-dextroamphetamine XR 20 mg XR capsule Commonly known as:  ADDERALL XR Take 1 Cap (20 mg total) by mouth daily. Max Daily Amount: 20 mg  
  
 clonazePAM 0.5 mg tablet Commonly known as:  Parley Sea Take 1 Tab by mouth daily. Max Daily Amount: 0.5 mg.  
  
 furosemide 20 mg tablet Commonly known as:  LASIX Take 1 Tab by mouth two (2) times a day.  
  
 gabapentin 300 mg capsule Commonly known as:  NEURONTIN  
TAKE 1 CAPSULE BY MOUTH ONCE DAILY, BUT CAN TAKE UP TO 3 CAPSULES DAILY. HYDROcodone-acetaminophen 5-325 mg per tablet Commonly known as:  Irma Parisian Take 1 Tab by mouth every eight (8) hours as needed for Pain. Max Daily Amount: 3 Tabs. hydrOXYzine HCl 25 mg tablet Commonly known as:  ATARAX Take  by mouth three (3) times daily as needed for Itching.  
  
 hydrOXYzine pamoate 25 mg capsule Commonly known as:  VISTARIL Take 1 Cap by mouth three (3) times daily as needed for Anxiety for up to 14 days. lisinopril 20 mg tablet Commonly known as:  Adrianna Hu Take 20 mg by mouth daily. metoprolol succinate 50 mg XL tablet Commonly known as:  TOPROL-XL Take 1 Tab by mouth daily. mirtazapine 15 mg disintegrating tablet Commonly known as:  REMERON SOL-TAB Take 1 Tab by mouth nightly. ondansetron hcl 4 mg tablet Commonly known as:  ZOFRAN (AS HYDROCHLORIDE) Take 1 Tab by mouth every eight (8) hours as needed for Nausea. spironolactone 25 mg tablet Commonly known as:  ALDACTONE Take 1 Tab by mouth daily. zolpidem 5 mg tablet Commonly known as:  AMBIEN Take 5 mg by mouth nightly. We Performed the Following AMB POC EKG ROUTINE W/ 12 LEADS, INTER & REP [46388 CPT(R)] Introducing [facility] & [facility] SERVICES! [facility] introduces [employer] patient portal. Now you can access parts of your medical record, email your doctor's office, and request medication refills online. 1. In your internet browser, go to [email or web address] 2. Click on the First Time User? Click Here link in the Sign In box. You will see the New Member Sign Up page. 3. Enter your [employer] Access Code exactly as it appears below. You will not need to use this code after youve completed the sign-up process. If you do not sign up before the expiration date, you must request a new code. · [employer] Access Code: [email or web address] Expires: 12/24/2017  2:47 PM 
 
4. Enter the last four digits of your Social Security Number (xxxx) and Date of Birth (mm/dd/yyyy) as indicated and click Submit. You will be taken to the next sign-up page. 5. Create a [employer] ID. This will be your [employer] login ID and cannot be changed, so think of one that is secure and easy to remember. 6. Create a [employer] password. You can change your password at any time. 7. Enter your Password Reset Question and Answer. This can be used at a later time if you forget your password. 8. Enter your e-mail address. You will receive e-mail notification when new information is available in [address] 9. Click Sign Up. You can now view and download portions of your medical record. 10. Click the Download Summary menu link to download a portable copy of your medical information. If you have questions, please visit the Frequently Asked Questions section of the [employer] website. Remember, [employer] is NOT to be used for urgent needs. For medical emergencies, dial 911. Now available from your iPhone and Android! Please provide this summary of care documentation to your next provider. Your primary care clinician is listed as Marcie Pandey. If you have any questions after today's visit, please call [phone].

## 2017-11-14 NOTE — PROGRESS NOTES
Chief Complaint   Patient presents with    Medication Refill     Reviewed record in preparation for visit and have obtained necessary documentation. Identified pt with two pt identifiers(name and ). Chief Complaint   Patient presents with    Medication Refill       Vitals:    17 1632   BP: 104/60   Pulse: 84   Resp: 20   Temp: 97.4 °F (36.3 °C)   TempSrc: Oral   SpO2: 96%   Weight: 172 lb 11.2 oz (78.3 kg)   Height: 5' 8\" (1.727 m)   PainSc:   8       Coordination of Care Questionnaire:  :     1) Have you been to an emergency room, urgent care clinic since your last visit? no   Hospitalized since your last visit? no             2) Have you seen or consulted any other health care providers outside of [address] [place] [address] since your last visit? no  (Include any pap smears or colon screenings in this section.)    3) In the event something were to happen to you and you were unable to speak on your behalf, do you have an Advance Directive/ Living Will in place stating your wishes? NO    Do you have an Advance Directive on file? no    4) Are you interested in receiving information on Advance Directives? NO    Patient is accompanied by self I have received verbal consent from Rayo Beth to discuss any/all medical information while they are present in the room.

## 2017-11-14 NOTE — ED PROVIDER NOTES
[facility].  EMERGENCY DEPARTMENT HISTORY AND PHYSICAL EXAM       Date of Service: 11/14/2017   Patient Name: Rayo Beth   YOB: 1985  Medical Record Number: [id number]    History of Presenting Illness     Chief Complaint   Patient presents with    Chest Pain     Onset to center of chest 4 hours ago. Unrelieved by by [facility] prescription.  Vomiting     Onset after chest pain began. History Provided By:  Patient     Additional History:   Rayo Beth is a 28 y.o. male with PMHx significant for Pacemaker / Gout / Asthma / CAD / Stroke who presents via EMS from [facility] to the ED c/o constant 10/10 chest pain and pressure \"shooting\" down his right arm and radiating to his back x five hours. Pt also endorses nausea, diaphoresis, leg swelling, right leg numbness, SOB and fatigue. States that he was taking a shower earlier today and felt like he \"was going to pass out\". Reports that over the past week he has gained 13 lbs. Pt had ASA and NTG today without relief of sx. He was seen today at his PCP's office for cough and congestion that has persisted over the past week. Pt notes that cough has subsided but congestion is still present. While in the office, pt endorses that the pressure in his chest increased so he was sent to the ED for further evaluation. He notes FHx significant for congenital heart disease. Pt reports that he was recently placed on hydroxyzine. He denies any change in urinary or bowel habits. Pt denies any hx of abdominal surgeries. He specifically denies fever, chills, leg pain or lower back pain. Social Hx: - Tobacco, - EtOH, - Illicit Drugs    There are no other complaints, changes or physical findings at this time.     Primary Care Provider: Paco Shore NP       Past History     Past Medical History:   Past Medical History:   Diagnosis Date    Asthma     CAD (coronary artery disease)     Gout     Heart failure [facility])     with pacemaker, states transition of great vessels    Pacemaker     Stroke [facility])     Transposition great arteries         Past Surgical History:   Past Surgical History:   Procedure Laterality Date    HX PACEMAKER          Family History:   History reviewed. No pertinent family history. Social History:   Social History   Substance Use Topics    Smoking status: Never Smoker    Smokeless tobacco: Never Used      Comment: advised not to start    Alcohol use No        Allergies: Allergies   Allergen Reactions    Betadine [Povidone-Iodine] Rash         Review of Systems   Review of Systems   Constitutional: Positive for diaphoresis, fatigue and unexpected weight change. Negative for chills and fever. HENT: Positive for congestion. Eyes: Negative for visual disturbance. Respiratory: Positive for shortness of breath. Negative for cough (now subsided). Cardiovascular: Positive for chest pain (radiating to back and down right arm) and leg swelling. Gastrointestinal: Positive for nausea. Endocrine: Negative for heat intolerance. Genitourinary: Negative for dysuria. Musculoskeletal: Negative for back pain (lower). Negative for leg pain   Skin: Negative for rash. Allergic/Immunologic: Negative for immunocompromised state. Neurological: Positive for numbness (right leg). Positive for feeling of almost passing out   Hematological: Does not bruise/bleed easily. Psychiatric/Behavioral: Negative. All other systems reviewed and are negative. Physical Exam  Physical Exam   Constitutional: He is oriented to person, place, and time. He appears well-developed and well-nourished. He appears distressed (moderate). HENT:   Head: Normocephalic and atraumatic. Eyes: EOM are normal. Pupils are equal, round, and reactive to light. Neck: Normal range of motion. Neck supple. Cardiovascular: Normal rate, regular rhythm and normal heart sounds.     Pulmonary/Chest: Effort normal and breath sounds normal. He has no wheezes. He exhibits tenderness (non-reproducible ). Abdominal: Soft. Bowel sounds are normal. There is tenderness in the epigastric area. Musculoskeletal: Normal range of motion. He exhibits edema. He exhibits no tenderness. BL LEGS:   Non-tender  Trace edema   Neurological: He is alert and oriented to person, place, and time. No cranial nerve deficit. Sensory intact   Skin: Skin is warm and dry. Psychiatric: He has a normal mood and affect. Nursing note and vitals reviewed. Medical Decision Making   I am the first provider for this patient. I reviewed the vital signs, available nursing notes, past medical history, past surgical history, family history and social history. Old Medical Records: Old medical records. Previous electrocardiograms. Nursing notes. Previous radiology studies. Provider Notes:   DDx: CAD, Costochondritis, PE, Pleurisy, Sciatica, CHF, Pancreatitis, Electrolyte abnormality, Dehydration. Critical Care Time:   0 minutes    ED Course:  6:06 PM   Initial assessment performed. The patients presenting problems have been discussed, and they are in agreement with the care plan formulated and outlined with them. I have encouraged them to ask questions as they arise throughout their visit. Progress Notes:   PROGRESS NOTE:  7:46 PM  Pt reevaluated. Pt states that his pain is still 9/10. Written by Ana Cutler. Elvia Vega ED Scribe, as dictated by Lashawn Carrero MD    PROGRESS NOTE:  8:36 PM  Pt reevaluated. Pt is feeling much better, pain is under control after second dose of Dilaudid 0.2 mg and Pepcid 20 mg.  Written by Ana Cutler.  Elvia Vega ED Scribe, as dictated by Lashawn Carrero MD    Diagnostic Study Results     Labs -      Recent Results (from the past 12 hour(s))   EKG, 12 LEAD, INITIAL    Collection Time: 11/14/17  5:36 PM   Result Value Ref Range    Ventricular Rate 84 BPM    Atrial Rate 84 BPM    P-R Interval 204 ms    QRS Duration 100 ms    Q-T Interval 418 ms    QTC Calculation (Bezet) 493 ms    Calculated R Axis 179 degrees    Calculated T Axis 21 degrees    Diagnosis       Atrial-paced rhythm  Incomplete right bundle branch block , plus right ventricular hypertrophy  Inferior infarct (cited on or before 14-NOV-2017)  Abnormal ECG  When compared with ECG of 25-SEP-2017 09:54,  No significant change was found     CBC WITH AUTOMATED DIFF    Collection Time: 11/14/17  5:54 PM   Result Value Ref Range    WBC 6.9 4.1 - 11.1 K/uL    RBC 3.99 (L) 4.10 - 5.70 M/uL    HGB 12.0 (L) 12.1 - 17.0 g/dL    HCT 35.7 (L) 36.6 - 50.3 %    MCV 89.5 80.0 - 99.0 FL    MCH 30.1 26.0 - 34.0 PG    MCHC 33.6 30.0 - 36.5 g/dL    RDW 13.3 11.5 - 14.5 %    PLATELET 460 675 - 026 K/uL    NEUTROPHILS 67 32 - 75 %    LYMPHOCYTES 24 12 - 49 %    MONOCYTES 7 5 - 13 %    EOSINOPHILS 2 0 - 7 %    BASOPHILS 0 0 - 1 %    ABS. NEUTROPHILS 4.6 1.8 - 8.0 K/UL    ABS. LYMPHOCYTES 1.7 0.8 - 3.5 K/UL    ABS. MONOCYTES 0.5 0.0 - 1.0 K/UL    ABS. EOSINOPHILS 0.2 0.0 - 0.4 K/UL    ABS. BASOPHILS 0.0 0.0 - 0.1 K/UL   METABOLIC PANEL, COMPREHENSIVE    Collection Time: 11/14/17  5:54 PM   Result Value Ref Range    Sodium 141 136 - 145 mmol/L    Potassium 3.6 3.5 - 5.1 mmol/L    Chloride 109 (H) 97 - 108 mmol/L    CO2 24 21 - 32 mmol/L    Anion gap 8 5 - 15 mmol/L    Glucose 88 65 - 100 mg/dL    BUN 18 6 - 20 MG/DL    Creatinine 1.20 0.70 - 1.30 MG/DL    BUN/Creatinine ratio 15 12 - 20      GFR est AA >60 >60 ml/min/1.73m2    GFR est non-AA >60 >60 ml/min/1.73m2    Calcium 8.0 (L) 8.5 - 10.1 MG/DL    Bilirubin, total 0.2 0.2 - 1.0 MG/DL    ALT (SGPT) 70 12 - 78 U/L    AST (SGOT) 39 (H) 15 - 37 U/L    Alk.  phosphatase 85 45 - 117 U/L    Protein, total 6.8 6.4 - 8.2 g/dL    Albumin 3.8 3.5 - 5.0 g/dL    Globulin 3.0 2.0 - 4.0 g/dL    A-G Ratio 1.3 1.1 - 2.2     CK W/ CKMB & INDEX    Collection Time: 11/14/17  5:54 PM   Result Value Ref Range    CK 95 39 - 308 U/L    CK - MB <1.0 <3.6 NG/ML    CK-MB Index Cannot be calculated 0 - 2.5     TROPONIN I    Collection Time: 11/14/17  5:54 PM   Result Value Ref Range    Troponin-I, Qt. <0.04 <0.05 ng/mL   D DIMER    Collection Time: 11/14/17  5:54 PM   Result Value Ref Range    D-dimer 0.30 0.00 - 0.65 mg/L FEU   LIPASE    Collection Time: 11/14/17  5:54 PM   Result Value Ref Range    Lipase 176 73 - 393 U/L       Radiologic Studies -  The following have been ordered and reviewed:  US ABD COMP   Final Result   EXAM:  US ABD COMP      INDICATION: Midline chest pain and epigastric abdominal pain for 4 hours were  not relieved by nitroglycerin. .     COMPARISON: None.     TECHNIQUE:   Real-time sonography of the abdomen was performed with multiple static images of  the liver, gallbladder, pancreas, spleen, kidneys and retroperitoneum obtained.     FINDINGS:  LIVER:   The liver is normal in echotexture with no mass or other focal abnormality.      LIVER VASCULATURE:   The portal vein flow is hepatopetal at 25 cm/s.     GALLBLADDER:  The gallbladder is normal and no stones are identified. There is no wall  thickening or fluid around the gallbladder.      COMMON BILE DUCT:  There is no biliary duct dilatation and the common duct measures 3 mm in  diameter.      PANCREAS:  The visualized pancreas is normal.     SPLEEN:  The spleen is normal in echotexture and size and measures 10.9 cm in length.     RIGHT KIDNEY:  The right kidney demonstrates normal echogenicity with no mass or  hydronephrosis. 6 mm calculus is in the mid right kidney. The right kidney  measures 10.4 cm in length.     LEFT KIDNEY:  The left kidney demonstrates normal echogenicity with  no mass or  hydronephrosis. 6 mm calculus is in the lower pole. The left kidney measures  10.3 cm in length.      RETROPERITONEUM:  The aorta tapers normally. The IVC is normal.  No retroperitoneal mass is identified.     IMPRESSION  IMPRESSION:   1. Normal gallbladder and biliary tree.   2. Nonobstructing bilateral nephrolithiasis. XR ABD FLAT/ ERECT   Final Result   EXAM:  XR ABD FLAT/ ERECT     INDICATION: Chest pain and vomiting for 4 hours.     COMPARISON: None     TECHNIQUE: Upright and supine abdomen views.     FINDINGS: The lungs bases are clear. No free air under the diaphragm.     There is scattered air within the colon and small bowel. No bowel dilatation to  suggest obstruction. No evidence of free intraperitoneal air.      No calcification projected over the kidneys. The osseous structures are age  appropriate.     IMPRESSION  IMPRESSION:   Nonobstructive bowel gas pattern without evidence of free air. XR CHEST PORT   Final Result   INDICATION: Chest pain. Onset 4 hours ago.     Portable AP upright view of the chest.     Direct comparison made to prior chest x-ray dated September 25, 2017.     Cardiomediastinal silhouette is stable. Intact pacer lead is unchanged in  position. Lungs are clear bilaterally. Pleural spaces are normal. Osseous  structures are intact.     IMPRESSION  IMPRESSION: No acute cardiopulmonary disease. Vital Signs-Reviewed the patient's vital signs.    Patient Vitals for the past 12 hrs:   Temp Pulse Resp BP SpO2   11/14/17 1930 - 85 19 100/68 98 %   11/14/17 1915 - 85 13 116/71 97 %   11/14/17 1900 - 87 18 107/76 99 %   11/14/17 1845 - 85 17 101/66 98 %   11/14/17 1830 - 85 15 105/61 98 %   11/14/17 1815 - 85 17 107/68 99 %   11/14/17 1800 - 85 12 102/61 100 %   11/14/17 1745 - 85 14 103/55 98 %   11/14/17 1737 98.1 °F (36.7 °C) 85 16 108/57 99 %       Medications Given in the ED:  Medications   diphenhydrAMINE (BENADRYL) injection 50 mg (50 mg IntraVENous Given 11/14/17 1855)   sodium chloride 0.9 % bolus infusion 1,000 mL (1,000 mL IntraVENous New Bag 11/14/17 1854)   HYDROmorphone (PF) (DILAUDID) injection 0.2 mg (0.2 mg IntraVENous Given 11/14/17 1859)   ondansetron (ZOFRAN) injection 4 mg (4 mg IntraVENous Given 11/14/17 1855)   famotidine (PF) (PEPCID) 20 mg in sodium chloride 0.9 % 10 mL injection (20 mg IntraVENous Given 11/14/17 2003)   sodium chloride 0.9 % bolus infusion 1,000 mL (1,000 mL IntraVENous New Bag 11/14/17 2005)   HYDROmorphone (PF) (DILAUDID) injection 0.2 mg (0.2 mg IntraVENous Given 11/14/17 2003)   HYDROmorphone (PF) (DILAUDID) injection 0.2 mg (0.2 mg IntraVENous Given 11/14/17 2131)       Pulse Oximetry Analysis - Normal 96% on room air     Cardiac Monitor:   Rate: 84  Rhythm: Atrial paced    EKG interpretation: (Preliminary) 1740  Rhythm: atrial paced; and regular . Rate (approx.): 84 bpm; Axis: normal; VA interval: normal; QRS interval: normal ; ST/T wave: normal; Other findings: incomplete right bundle branch block, right ventricular hypertrophy, no significant changes from prior EKG. Written by Yu Watkins ED Scribe, as dictated by Wilton Pang MD    Diagnosis   Clinical Impression:   1. Acute chest pain    2. Abdominal pain, epigastric         Plan:  1:   Follow-up Information     Follow up With Details Comments Contact Info    Nahed Freitas NP In 2 days As needed Angel Santamaria [phone]      call your Cardiologist In 1 day      [facility] [place] [facility]  If symptoms worsen [address]  [address]  [phone]        2:   Current Discharge Medication List      START taking these medications    Details   ondansetron (ZOFRAN ODT) 4 mg disintegrating tablet Take 1 Tab by mouth every eight (8) hours as needed for Nausea. Qty: 10 Tab, Refills: 0      famotidine (PEPCID) 20 mg tablet Take 1 Tab by mouth two (2) times a day. Qty: 20 Tab, Refills: 0         CONTINUE these medications which have CHANGED    Details   HYDROcodone-acetaminophen (NORCO) 5-325 mg per tablet Take 1 Tab by mouth every four (4) hours as needed for Pain. Max Daily Amount: 6 Tabs.   Qty: 20 Tab, Refills: 0           Return to ED if Worse    Disposition Note:    DISCHARGE NOTE:  9:30 PM  The patient's results have been reviewed with family and/or caregiver. They verbally convey their understanding and agreement of the patient's signs, symptoms, diagnosis, treatment, and prognosis. They additionally agree to follow up as recommended in the discharge instructions or to return to the Emergency Room should the patient's condition change prior to their follow-up appointment. The family and/or caregiver verbally agrees with the care-plan and all of their questions have been answered. The discharge instructions have also been provided to the them along with educational information regarding the patient's diagnosis and a list of reasons why the patient would want to return to the ER prior to their follow-up appointment should their condition change. Written by Dea Calhoun. Deangelo Brothers ED Scribe, as dictated by Elo Benjamin MD.  _______________________________   Attestations: This note is prepared by Dea Calhoun. Armida, acting as Scribe for Elo Benjamin MD.    Elo Benjamin MD: The scribe's documentation has been prepared under my direction and personally reviewed by me in its entirety.  I confirm that the note above accurately reflects all work, treatment, procedures, and medical decision making performed by me.   _______________________________

## 2017-11-14 NOTE — LETTER
[address] 
[facility] EMERGENCY DEPT 
[address] [place] [address] [address] [id number] 
[phone] Work/School Note Date: 11/14/2017 To Whom It May concern: 
 
Celi Smith was seen and treated today in the emergency room by the following provider(s): 
Attending Provider: Lorenzo Mccloud MD. Celi Smith may return to work on 11/15/2017. Sincerely, Lorenzo Mccloud MD

## 2017-11-14 NOTE — ED NOTES
Pre-alerted by EMS for STEMI. Cath lab paged at [phone]. EKG transmitted and shown to Dr. Alexandr Nash at 9730 - cath lab cancelled at this time.

## 2017-11-14 NOTE — PROGRESS NOTES
S: Walter Hernandez is a 28 y.o. male who presents for medication check and refill. Assessment/Plan:    1.  Chest pain, SOB, right arm pain  Transposition great arteries, hx of [facility]ard Procedure (1985), Right ventricular failure, hx of CVA, pacemaker  -weight gain of 8 lbs since last month  -called 911 due to sx and increasing pain pt reporting; pt taken to hospital via ambulance           Requesting refills for adderall and norco - declined as I reiterated he needs to establish with cardiology to get these medications   - checked today     [address] and put on clonidine and atarax   vistaril 25mg-50mg TID for anxiety/panic attacks  klonopin 0.5mg PRN daily for anxiety/panic attacks  remeron 15mg for tx of depression/anxiety/sleep difficulty  pt requested Daniel at [facility]  to write adderall, declined to write and pt has no f/u appt with [facility] at this time  Has stopped psych meds - didn't think they were helping and due to pain    SOB, shooting pain down right arm,   Patient has gained 8 lbs since visit last month - pt states he hasn't been eating much    Biometric screenings - at [facility], [facility]; check ups for transplant list   Goes to [facility] for pacemaker - last pacemaker reading - has been within the past year, but not sure when     Review of Systems:  - Constitutional Symptoms: + fevers, chills, + weight gain  - Cardiovascular: no chest pain or palpitations  - Respiratory: no cough or shortness of breath  - Gastrointestinal: no dysphagia or abdominal pain  - Musculoskeletal: no joint pains or weakness  - Neurological: no numbness, tingling, or headaches    PHQ over the last two weeks 11/14/2017   Little interest or pleasure in doing things Not at all   Feeling down, depressed or hopeless Not at all   Total Score PHQ 2 0       I reviewed the following:  Current Outpatient Prescriptions   Medication Sig Dispense Refill    hydrOXYzine HCl (ATARAX) 25 mg tablet Take  by mouth three (3) times daily as needed for Itching.  hydrOXYzine pamoate (VISTARIL) 25 mg capsule Take 1 Cap by mouth three (3) times daily as needed for Anxiety for up to 14 days. 90 Cap 0    clonazePAM (KLONOPIN) 0.5 mg tablet Take 1 Tab by mouth daily. Max Daily Amount: 0.5 mg. 15 Tab 0    spironolactone (ALDACTONE) 25 mg tablet Take 1 Tab by mouth daily. 30 Tab 1    metoprolol succinate (TOPROL-XL) 50 mg XL tablet Take 1 Tab by mouth daily. 30 Tab 1    furosemide (LASIX) 20 mg tablet Take 1 Tab by mouth two (2) times a day. 30 Tab 1    lisinopril (PRINIVIL, ZESTRIL) 20 mg tablet Take 20 mg by mouth daily.  gabapentin (NEURONTIN) 300 mg capsule TAKE 1 CAPSULE BY MOUTH ONCE DAILY, BUT CAN TAKE UP TO 3 CAPSULES DAILY. 3    mirtazapine (REMERON SOL-TAB) 15 mg disintegrating tablet Take 1 Tab by mouth nightly. 30 Tab 0    amphetamine-dextroamphetamine XR (ADDERALL XR) 20 mg XR capsule Take 1 Cap (20 mg total) by mouth daily. Max Daily Amount: 20 mg 30 Cap 0    HYDROcodone-acetaminophen (NORCO) 5-325 mg per tablet Take 1 Tab by mouth every eight (8) hours as needed for Pain. Max Daily Amount: 3 Tabs. 12 Tab 0    zolpidem (AMBIEN) 5 mg tablet Take 5 mg by mouth nightly.  ondansetron hcl (ZOFRAN, AS HYDROCHLORIDE,) 4 mg tablet Take 1 Tab by mouth every eight (8) hours as needed for Nausea. 21 Tab 0       Past Medical History:   Diagnosis Date    Asthma     CAD (coronary artery disease)     Gout     Heart failure ([address]     with pacemaker, states transition of great vessels    Pacemaker     [facility]roke [facility])     Transposition great arteries        Allergies   Allergen Reactions    Betadine [Povidone-Iodine] Rash       O: VS:   Visit Vitals    /60 (BP 1 Location: Right arm, BP Patient Position: Sitting)    Pulse 84    Temp 97.4 °F (36.3 °C) (Oral)    Resp 20    Ht 5' 8\" (1.727 m)    Wt 172 lb 11.2 oz (78.3 kg)    SpO2 96%    BMI 26.26 kg/m2       GENERAL: Lemmie Aleksandar is in no acute distress. Non-toxic.  Well nourished. Well developed. Appropriately groomed. HEAD:  Normocephalic. Atraumatic. EYE: PERRLA. RESP: Breath sounds are symmetrical bilaterally. Unlabored without SOB. Speaking in full sentences. Clear to auscultation bilaterally anteriorly and posteriorly. No wheezes. No rales or rhonchi. CV: normal rate, S1, S2 audible. Click noted. PSYCH: appropriate behavior, dress and thought processes. Good eye contact.    _______________________________________________________________  Patient sent out in ambulance. Follow up as directed.

## 2017-11-15 LAB
ATRIAL RATE: 84 BPM
CALCULATED R AXIS, ECG10: 179 DEGREES
CALCULATED T AXIS, ECG11: 21 DEGREES
DIAGNOSIS, 93000: NORMAL
P-R INTERVAL, ECG05: 204 MS
Q-T INTERVAL, ECG07: 418 MS
QRS DURATION, ECG06: 100 MS
QTC CALCULATION (BEZET), ECG08: 493 MS
VENTRICULAR RATE, ECG03: 84 BPM

## 2017-11-15 NOTE — DISCHARGE INSTRUCTIONS
Chest Pain: Care Instructions  Your Care Instructions    There are many things that can cause chest pain. Some are not serious and will get better on their own in a few days. But some kinds of chest pain need more testing and treatment. Your doctor may have recommended a follow-up visit in the next 8 to 12 hours. If you are not getting better, you may need more tests or treatment. Even though your doctor has released you, you still need to watch for any problems. The doctor carefully checked you, but sometimes problems can develop later. If you have new symptoms or if your symptoms do not get better, get medical care right away. If you have worse or different chest pain or pressure that lasts more than 5 minutes or you passed out (lost consciousness), call 911 or seek other emergency help right away. A medical visit is only one step in your treatment. Even if you feel better, you still need to do what your doctor recommends, such as going to all suggested follow-up appointments and taking medicines exactly as directed. This will help you recover and help prevent future problems. How can you care for yourself at home? · Rest until you feel better. · Take your medicine exactly as prescribed. Call your doctor if you think you are having a problem with your medicine. · Do not drive after taking a prescription pain medicine. When should you call for help? Call 911 if:  ? · You passed out (lost consciousness). ? · You have severe difficulty breathing. ? · You have symptoms of a heart attack. These may include:  ¨ Chest pain or pressure, or a strange feeling in your chest.  ¨ Sweating. ¨ Shortness of breath. ¨ Nausea or vomiting. ¨ Pain, pressure, or a strange feeling in your back, neck, jaw, or upper belly or in one or both shoulders or arms. ¨ Lightheadedness or sudden weakness. ¨ A fast or irregular heartbeat.   After you call 911, the  may tell you to chew 1 adult-strength or 2 to 4 low-dose aspirin. Wait for an ambulance. Do not try to drive yourself. ?Call your doctor today if:  ? · You have any trouble breathing. ? · Your chest pain gets worse. ? · You are dizzy or lightheaded, or you feel like you may faint. ? · You are not getting better as expected. ? · You are having new or different chest pain. Where can you learn more? Go to http://mj-niels.info/. Enter A120 in the search box to learn more about \"Chest Pain: Care Instructions. \"  Current as of: March 20, 2017  Content Version: 11.4  © 1229-9653 [employer]. Care instructions adapted under license by [employer] (which disclaims liability or warranty for this information). If you have questions about a medical condition or this instruction, always ask your healthcare professional. [address] any warranty or liability for your use of this information. Abdominal Pain: Care Instructions  Your Care Instructions    Abdominal pain has many possible causes. Some aren't serious and get better on their own in a few days. Others need more testing and treatment. If your pain continues or gets worse, you need to be rechecked and may need more tests to find out what is wrong. You may need surgery to correct the problem. Don't ignore new symptoms, such as fever, nausea and vomiting, urination problems, pain that gets worse, and dizziness. These may be signs of a more serious problem. Your doctor may have recommended a follow-up visit in the next 8 to 12 hours. If you are not getting better, you may need more tests or treatment. The doctor has checked you carefully, but problems can develop later. If you notice any problems or new symptoms, get medical treatment right away. Follow-up care is a key part of your treatment and safety. Be sure to make and go to all appointments, and call your doctor if you are having problems.  It's also a good idea to know your test results and keep a list of the medicines you take. How can you care for yourself at home? · Rest until you feel better. · To prevent dehydration, drink plenty of fluids, enough so that your urine is light yellow or clear like water. Choose water and other caffeine-free clear liquids until you feel better. If you have kidney, heart, or liver disease and have to limit fluids, talk with your doctor before you increase the amount of fluids you drink. · If your stomach is upset, eat mild foods, such as rice, dry toast or crackers, bananas, and applesauce. Try eating several small meals instead of two or three large ones. · Wait until 48 hours after all symptoms have gone away before you have spicy foods, alcohol, and drinks that contain caffeine. · Do not eat foods that are high in fat. · Avoid anti-inflammatory medicines such as aspirin, ibuprofen (Advil, Motrin), and naproxen (Aleve). These can cause stomach upset. Talk to your doctor if you take daily aspirin for another health problem. When should you call for help? Call 911 anytime you think you may need emergency care. For example, call if:  ? · You passed out (lost consciousness). ? · You pass maroon or very bloody stools. ? · You vomit blood or what looks like coffee grounds. ? · You have new, severe belly pain. ?Call your doctor now or seek immediate medical care if:  ? · Your pain gets worse, especially if it becomes focused in one area of your belly. ? · You have a new or higher fever. ? · Your stools are black and look like tar, or they have streaks of blood. ? · You have unexpected vaginal bleeding. ? · You have symptoms of a urinary tract infection. These may include:  ¨ Pain when you urinate. ¨ Urinating more often than usual.  ¨ Blood in your urine. ? · You are dizzy or lightheaded, or you feel like you may faint. ? Watch closely for changes in your health, and be sure to contact your doctor if:  ? · You are not getting better after 1 day (24 hours). Where can you learn more? Go to http://mj-niels.info/. Enter A512 in the search box to learn more about \"Abdominal Pain: Care Instructions. \"  Current as of: March 20, 2017  Content Version: 11.4  © 0318-5485 [employer]. Care instructions adapted under license by [employer] (which disclaims liability or warranty for this information). If you have questions about a medical condition or this instruction, always ask your healthcare professional. [address] any warranty or liability for your use of this information.

## 2017-11-15 NOTE — ED NOTES
Discharge instructions reviewed with pt and copy given along with RX by ER MD Maricarmen Park. Patient discharged via wheelchair accompanied by aunt providing transportation in no sign of distress or discomfort at this time.

## 2017-11-27 ENCOUNTER — HOSPITAL ENCOUNTER (EMERGENCY)
Age: 32
Discharge: HOME | End: 2017-11-27
Attending: EMERGENCY MEDICINE
Payer: COMMERCIAL

## 2017-11-27 ENCOUNTER — APPOINTMENT (OUTPATIENT)
Dept: GENERAL RADIOLOGY | Age: 32
End: 2017-11-27
Attending: EMERGENCY MEDICINE
Payer: COMMERCIAL

## 2017-11-27 VITALS
HEART RATE: 85 BPM | OXYGEN SATURATION: 100 % | RESPIRATION RATE: 14 BRPM | HEIGHT: 67 IN | DIASTOLIC BLOOD PRESSURE: 80 MMHG | WEIGHT: 165.79 LBS | BODY MASS INDEX: 26.02 KG/M2 | TEMPERATURE: 98 F | SYSTOLIC BLOOD PRESSURE: 121 MMHG

## 2017-11-27 VITALS
WEIGHT: 166 LBS | OXYGEN SATURATION: 98 % | BODY MASS INDEX: 26.06 KG/M2 | DIASTOLIC BLOOD PRESSURE: 81 MMHG | TEMPERATURE: 98 F | HEIGHT: 67 IN | HEART RATE: 85 BPM | RESPIRATION RATE: 13 BRPM | SYSTOLIC BLOOD PRESSURE: 117 MMHG

## 2017-11-27 DIAGNOSIS — G89.29 CHRONIC CHEST PAIN: Primary | ICD-10-CM

## 2017-11-27 DIAGNOSIS — R07.9 CHEST PAIN, UNSPECIFIED TYPE: Primary | ICD-10-CM

## 2017-11-27 DIAGNOSIS — G89.29 OTHER CHRONIC PAIN: ICD-10-CM

## 2017-11-27 DIAGNOSIS — Z76.5 MALINGERING: ICD-10-CM

## 2017-11-27 DIAGNOSIS — R07.9 CHRONIC CHEST PAIN: Primary | ICD-10-CM

## 2017-11-27 DIAGNOSIS — Q24.9 CONGENITAL HEART ANOMALY: ICD-10-CM

## 2017-11-27 LAB
ALBUMIN SERPL-MCNC: 4.6 G/DL (ref 3.5–5)
ALBUMIN/GLOB SERPL: 1.1 {RATIO} (ref 1.1–2.2)
ALP SERPL-CCNC: 97 U/L (ref 45–117)
ALT SERPL-CCNC: 77 U/L (ref 12–78)
AMPHET UR QL SCN: POSITIVE
ANION GAP SERPL CALC-SCNC: 8 MMOL/L (ref 5–15)
AST SERPL-CCNC: 25 U/L (ref 15–37)
ATRIAL RATE: 84 BPM
BARBITURATES UR QL SCN: NEGATIVE
BASOPHILS # BLD: 0 K/UL (ref 0–0.1)
BASOPHILS NFR BLD: 0 % (ref 0–1)
BENZODIAZ UR QL: NEGATIVE
BILIRUB SERPL-MCNC: 1.1 MG/DL (ref 0.2–1)
BNP SERPL-MCNC: 195 PG/ML (ref 0–125)
BUN SERPL-MCNC: 29 MG/DL (ref 6–20)
BUN/CREAT SERPL: 22 (ref 12–20)
CALCIUM SERPL-MCNC: 9.2 MG/DL (ref 8.5–10.1)
CALCULATED P AXIS, ECG09: 40 DEGREES
CALCULATED R AXIS, ECG10: -173 DEGREES
CALCULATED T AXIS, ECG11: 31 DEGREES
CANNABINOIDS UR QL SCN: POSITIVE
CHLORIDE SERPL-SCNC: 103 MMOL/L (ref 97–108)
CK SERPL-CCNC: 127 U/L (ref 39–308)
CO2 SERPL-SCNC: 25 MMOL/L (ref 21–32)
COCAINE UR QL SCN: NEGATIVE
CREAT SERPL-MCNC: 1.3 MG/DL (ref 0.7–1.3)
DIAGNOSIS, 93000: NORMAL
DRUG SCRN COMMENT,DRGCM: ABNORMAL
EOSINOPHIL # BLD: 0.1 K/UL (ref 0–0.4)
EOSINOPHIL NFR BLD: 2 % (ref 0–7)
ERYTHROCYTE [DISTWIDTH] IN BLOOD BY AUTOMATED COUNT: 13.2 % (ref 11.5–14.5)
GLOBULIN SER CALC-MCNC: 4.1 G/DL (ref 2–4)
GLUCOSE SERPL-MCNC: 71 MG/DL (ref 65–100)
HCT VFR BLD AUTO: 42.2 % (ref 36.6–50.3)
HGB BLD-MCNC: 14.6 G/DL (ref 12.1–17)
LYMPHOCYTES # BLD: 1.3 K/UL (ref 0.8–3.5)
LYMPHOCYTES NFR BLD: 21 % (ref 12–49)
MCH RBC QN AUTO: 31.2 PG (ref 26–34)
MCHC RBC AUTO-ENTMCNC: 34.6 G/DL (ref 30–36.5)
MCV RBC AUTO: 90.2 FL (ref 80–99)
METHADONE UR QL: NEGATIVE
MONOCYTES # BLD: 0.6 K/UL (ref 0–1)
MONOCYTES NFR BLD: 10 % (ref 5–13)
NEUTS SEG # BLD: 4.2 K/UL (ref 1.8–8)
NEUTS SEG NFR BLD: 67 % (ref 32–75)
OPIATES UR QL: NEGATIVE
P-R INTERVAL, ECG05: 204 MS
PCP UR QL: NEGATIVE
PLATELET # BLD AUTO: 232 K/UL (ref 150–400)
POTASSIUM SERPL-SCNC: 4.1 MMOL/L (ref 3.5–5.1)
PROT SERPL-MCNC: 8.7 G/DL (ref 6.4–8.2)
Q-T INTERVAL, ECG07: 388 MS
QRS DURATION, ECG06: 100 MS
QTC CALCULATION (BEZET), ECG08: 458 MS
RBC # BLD AUTO: 4.68 M/UL (ref 4.1–5.7)
SODIUM SERPL-SCNC: 136 MMOL/L (ref 136–145)
TROPONIN I SERPL-MCNC: <0.04 NG/ML
VENTRICULAR RATE, ECG03: 84 BPM
WBC # BLD AUTO: 6.2 K/UL (ref 4.1–11.1)

## 2017-11-27 PROCEDURE — 85025 COMPLETE CBC W/AUTO DIFF WBC: CPT | Performed by: EMERGENCY MEDICINE

## 2017-11-27 PROCEDURE — 96374 THER/PROPH/DIAG INJ IV PUSH: CPT

## 2017-11-27 PROCEDURE — 74011250636 HC RX REV CODE- 250/636: Performed by: EMERGENCY MEDICINE

## 2017-11-27 PROCEDURE — 83880 ASSAY OF NATRIURETIC PEPTIDE: CPT | Performed by: EMERGENCY MEDICINE

## 2017-11-27 PROCEDURE — 93005 ELECTROCARDIOGRAM TRACING: CPT

## 2017-11-27 PROCEDURE — 71020 XR CHEST PA LAT: CPT

## 2017-11-27 PROCEDURE — 99284 EMERGENCY DEPT VISIT MOD MDM: CPT

## 2017-11-27 PROCEDURE — 96375 TX/PRO/DX INJ NEW DRUG ADDON: CPT

## 2017-11-27 PROCEDURE — 74011250637 HC RX REV CODE- 250/637: Performed by: EMERGENCY MEDICINE

## 2017-11-27 PROCEDURE — 80307 DRUG TEST PRSMV CHEM ANLYZR: CPT | Performed by: EMERGENCY MEDICINE

## 2017-11-27 PROCEDURE — 84484 ASSAY OF TROPONIN QUANT: CPT | Performed by: EMERGENCY MEDICINE

## 2017-11-27 PROCEDURE — 80053 COMPREHEN METABOLIC PANEL: CPT | Performed by: EMERGENCY MEDICINE

## 2017-11-27 PROCEDURE — 99285 EMERGENCY DEPT VISIT HI MDM: CPT

## 2017-11-27 PROCEDURE — 82550 ASSAY OF CK (CPK): CPT | Performed by: EMERGENCY MEDICINE

## 2017-11-27 PROCEDURE — 36415 COLL VENOUS BLD VENIPUNCTURE: CPT | Performed by: EMERGENCY MEDICINE

## 2017-11-27 RX ORDER — KETOROLAC TROMETHAMINE 30 MG/ML
15 INJECTION, SOLUTION INTRAMUSCULAR; INTRAVENOUS
Status: COMPLETED | OUTPATIENT
Start: 2017-11-27 | End: 2017-11-27

## 2017-11-27 RX ORDER — ONDANSETRON 2 MG/ML
4 INJECTION INTRAMUSCULAR; INTRAVENOUS
Status: COMPLETED | OUTPATIENT
Start: 2017-11-27 | End: 2017-11-27

## 2017-11-27 RX ORDER — LORAZEPAM 1 MG/1
1 TABLET ORAL
Status: COMPLETED | OUTPATIENT
Start: 2017-11-27 | End: 2017-11-27

## 2017-11-27 RX ORDER — CLONAZEPAM 0.5 MG/1
0.5 TABLET ORAL
COMMUNITY
End: 2017-12-11 | Stop reason: SDUPTHER

## 2017-11-27 RX ADMIN — KETOROLAC TROMETHAMINE 15 MG: 30 INJECTION, SOLUTION INTRAMUSCULAR at 11:39

## 2017-11-27 RX ADMIN — ONDANSETRON 4 MG: 2 INJECTION INTRAMUSCULAR; INTRAVENOUS at 12:28

## 2017-11-27 RX ADMIN — LORAZEPAM 1 MG: 1 TABLET ORAL at 13:57

## 2017-11-27 NOTE — ED PROVIDER NOTES
EMERGENCY DEPARTMENT HISTORY AND PHYSICAL EXAM      Date: 11/27/2017  Patient Name: Margarito Neil    History of Presenting Illness     Chief Complaint   Patient presents with    Chest Pain     seen at [facility] today for CP, went home and now c/o syncope and worsening CP       History Provided By: Patient    HPI: Margarito Neil, 28 y.o. male with PMHx significant for CAD, CHD, Asthma, Stroke and transposition of great arteries who presents ambulatory to the ED for evaluation after a syncopal episode earlier today after being seen at [facility] ED for sx's of worsening CP and SOB. Pt describes that his CP radiates to straight to his back, right upper and lower extremities and left side of his neck. Pt reports that he was seen at [facility] for these sx's. He states he had an EKG, XR chest and one set of troponin done. He states [facility] [place] [facility] noting [place] has yet to establish cardiology in [place]. He notes that his pain wasn't controlled at this visit. He reports after being discharged he went home to take a shower when he had a syncopal episode. He states he believes the syncope was related to the intensity of his pain, noting he was experiencing CP and SOB prior to the episode. He reports having [employer] pacemaker. He denies being on blood thinners. He denies sx of cough, fever and chills. PCP: Deb Ervin, NP    There are no other complaints, changes, or physical findings at this time. Current Outpatient Prescriptions   Medication Sig Dispense Refill    clonazePAM (KLONOPIN) 0.5 mg tablet Take 0.5 mg by mouth daily as needed.  ondansetron (ZOFRAN ODT) 4 mg disintegrating tablet Take 1 Tab by mouth every eight (8) hours as needed for Nausea. 10 Tab 0    amphetamine-dextroamphetamine XR (ADDERALL XR) 20 mg XR capsule Take 1 Cap (20 mg total) by mouth daily. Max Daily Amount: 20 mg 30 Cap 0    spironolactone (ALDACTONE) 25 mg tablet Take 1 Tab by mouth daily.  30 Tab 1    metoprolol succinate (TOPROL-XL) 50 mg XL tablet Take 1 Tab by mouth daily. 30 Tab 1    furosemide (LASIX) 20 mg tablet Take 1 Tab by mouth two (2) times a day. 30 Tab 1    lisinopril (PRINIVIL, ZESTRIL) 20 mg tablet Take 20 mg by mouth daily. Past History     Past Medical History:  Past Medical History:   Diagnosis Date    Asthma     CAD (coronary artery disease)     Gout     Heart failure ([address]     with pacemaker, states transition of great vessels    Pacemaker     Stroke [facility])     Transposition great arteries        Past Surgical History:  Past Surgical History:   Procedure Laterality Date    HX PACEMAKER         Family History:  History reviewed. No pertinent family history. Social History:  Social History   Substance Use Topics    Smoking status: Never Smoker    Smokeless tobacco: Never Used      Comment: advised not to start    Alcohol use No       Allergies: Allergies   Allergen Reactions    Betadine [Povidone-Iodine] Rash         Review of Systems   Review of Systems   Constitutional: Negative for chills and fever. HENT: Negative for congestion and sore throat. Eyes: Negative for visual disturbance. Respiratory: Positive for shortness of breath. Negative for cough. Cardiovascular: Positive for chest pain. Negative for leg swelling. Gastrointestinal: Negative for abdominal pain, blood in stool, diarrhea and nausea. Endocrine: Negative for polyuria. Genitourinary: Negative for dysuria and testicular pain. Musculoskeletal: Positive for arthralgias and back pain. Negative for joint swelling and myalgias. Skin: Negative for rash. Allergic/Immunologic: Negative for immunocompromised state. Neurological: Positive for syncope. Negative for weakness and headaches. Hematological: Does not bruise/bleed easily. Psychiatric/Behavioral: Negative for confusion. All other systems reviewed and are negative.       Physical Exam   Physical Exam   Constitutional: He is oriented to person, place, and time. He appears well-developed and well-nourished. HENT:   Head: Normocephalic and atraumatic. Moist mucous membranes   Eyes: Conjunctivae are normal. Pupils are equal, round, and reactive to light. Right eye exhibits no discharge. Left eye exhibits no discharge. Neck: Normal range of motion. Neck supple. No tracheal deviation present. Cardiovascular: Normal rate, regular rhythm and normal heart sounds. No murmur heard. Pulmonary/Chest: Effort normal and breath sounds normal. No respiratory distress. He has no wheezes. He has no rales. Pacemaker to right chest wall   Abdominal: Soft. Bowel sounds are normal. There is no tenderness. There is no rebound and no guarding. Musculoskeletal: Normal range of motion. He exhibits no edema, tenderness or deformity. Neurological: He is alert and oriented to person, place, and time. Skin: Skin is warm and dry. No rash noted. No erythema. Midline incisional scar   Psychiatric: His behavior is normal.   Nursing note and vitals reviewed. Diagnostic Study Results     Labs -     Recent Results (from the past 12 hour(s))   EKG, 12 LEAD, INITIAL    Collection Time: 11/27/17  4:50 PM   Result Value Ref Range    Ventricular Rate 91 BPM    Atrial Rate 91 BPM    P-R Interval 168 ms    QRS Duration 100 ms    Q-T Interval 384 ms    QTC Calculation (Bezet) 472 ms    Calculated P Axis 71 degrees    Calculated R Axis 168 degrees    Calculated T Axis 15 degrees    Diagnosis        Suspect unspecified pacemaker failure  Normal sinus rhythm with sinus arrhythmia  Incomplete right bundle branch block , plus right ventricular hypertrophy  Possible Inferior infarct , age undetermined  When compared with ECG of 27-NOV-2017 11:20,  MANUAL COMPARISON REQUIRED, DATA IS UNCONFIRMED         Medical Decision Making   I am the first provider for this patient.     I reviewed the vital signs, available nursing notes, past medical history, past surgical history, family history and social history. Vital Signs-Reviewed the patient's vital signs. Patient Vitals for the past 12 hrs:   Temp Pulse Resp BP SpO2   11/27/17 1656 98 °F (36.7 °C) 85 14 121/80 100 %     Records Reviewed: Nursing Notes and Old Medical Records    Provider Notes (Medical Decision Making): Patient with multiple visits for chest pain. He has underlying cardiac disease, but his recent workups have been negative. He was seen earlier today. I do have concern for possible arrhythmia, pe, or dissection. Patient initially expressed that he wanted extensive workup including CT, Echo, Pacer interrogation. I advised that I would be happy to pursue extensive workup to be sure of patient's safety, but advised that I would not give any opioid pain medications while he was under my care. At this the patient became upset, he no longer wanted workup. He stated Corene Luizer is the point of doing the workup when what is making me pass out is the pain\". I consistently reiterated that I could pursue CT, pacer interrogation, and cardiology consult, but He would not get opioid pain medications while here. At this the patient asked to be discharged as he did not want to pursue the work up. I think he is likely safe for discharge. He was seen earlier today, and has been seen multiple times in our ED's with similar complaints. I encouraged him to return at anytime for further workup should he so choose. ED Course:   Initial assessment performed. The patients presenting problems have been discussed, and they are in agreement with the care plan formulated and outlined with them. I have encouraged them to ask questions as they arise throughout their visit. PROGRESS NOTE:  6:14 PM  Discussed with pt that I will evaluate him for his medical sx's and work him up with a second set of troponin, EKG, and XR/ CT imaging if needed. Also discussed working him up for possible causes of his syncopal episode.  However informed him that he would not be receiving IV opioids today. Pt is with no evidence of MI, aortic dissection and pneumothorax. Written by Shane Hooks, ED Scribe, as dictated by [employer], DO    Disposition:  6:18 PM    I informed the pt that he needed further workup for adequate evaluation for his chest pain and syncope, and that by refusing the above, he is at risk for myocardial infarction and arrhythmia. He is awake, alert, and he understands his condition and the risks involved in leaving. He is clinically aware of his surroundings and able to ask appropriate questions, and the nurse present confirms he is not clinically intoxicated and able to make medical decisions. He verbalized knowing the risks and understood it was recommended that he stay and could also return at any time. He was provided with warnings regarding worsening of his condition and were provided instructions to follow up with Say Leon NP tomorrow or return to the Emergency Room as soon as possible. PLAN:  1. Discharge Medication List as of 11/27/2017  6:21 PM        2. Follow-up Information     Follow up With Details Comments Contact Info    Chiki Doherty MD Call in 2 days  [id number] [facility]  [phone]      [facility] EMERGENCY DEPT  If symptoms worsen, or you decide you would like to under go further workup for your chest pain or syncope you can return at any time. [address] [facility] [address]   [address] [facility]  [phone]  [phone]        Return to ED if worse     Diagnosis     Clinical Impression:   1. Chest pain, unspecified type    2. Other chronic pain        Attestations:     This is note is prepared by Shane Hooks, acting as Scribe for [employer], DO    [employer] DO The scribe's documentation has been prepared under my direction and personally reviewed by me in its entirety. I confirm that the note above accurately reflects all work, treatment, procedures, and medical decision making performed by me. This note will not be viewable in [address]

## 2017-11-27 NOTE — ED NOTES
Pt states he has a congenital heart defect, and has been seen at [facility] for this, and was seen today for chest pain at [facility]. He was discharged from there due to [facility] wanting him to come to see them, but pt states he cannot get down there. Pt continuing to have chest pain.

## 2017-11-27 NOTE — ED NOTES
Dr. Jane Leonard   reviewed discharge instructions with the patient. The patient verbalized understanding. Upon cleaning room, found that patient had thrown his discharge papers in the garbage. Papers removed and shredded.

## 2017-11-27 NOTE — ED TRIAGE NOTES
Pt arrives to triage holding chest. \"I have a congenital heart disease and am having more CP with SOB\".

## 2017-11-27 NOTE — DISCHARGE INSTRUCTIONS
Chest Pain: Care Instructions  Your Care Instructions    There are many things that can cause chest pain. Some are not serious and will get better on their own in a few days. But some kinds of chest pain need more testing and treatment. Your doctor may have recommended a follow-up visit in the next 8 to 12 hours. If you are not getting better, you may need more tests or treatment. Even though your doctor has released you, you still need to watch for any problems. The doctor carefully checked you, but sometimes problems can develop later. If you have new symptoms or if your symptoms do not get better, get medical care right away. If you have worse or different chest pain or pressure that lasts more than 5 minutes or you passed out (lost consciousness), call 911 or seek other emergency help right away. A medical visit is only one step in your treatment. Even if you feel better, you still need to do what your doctor recommends, such as going to all suggested follow-up appointments and taking medicines exactly as directed. This will help you recover and help prevent future problems. How can you care for yourself at home? · Rest until you feel better. · Take your medicine exactly as prescribed. Call your doctor if you think you are having a problem with your medicine. · Do not drive after taking a prescription pain medicine. When should you call for help? Call 911 if:  ? · You passed out (lost consciousness). ? · You have severe difficulty breathing. ? · You have symptoms of a heart attack. These may include:  ¨ Chest pain or pressure, or a strange feeling in your chest.  ¨ Sweating. ¨ Shortness of breath. ¨ Nausea or vomiting. ¨ Pain, pressure, or a strange feeling in your back, neck, jaw, or upper belly or in one or both shoulders or arms. ¨ Lightheadedness or sudden weakness. ¨ A fast or irregular heartbeat.   After you call 911, the  may tell you to chew 1 adult-strength or 2 to 4 low-dose aspirin. Wait for an ambulance. Do not try to drive yourself. ?Call your doctor today if:  ? · You have any trouble breathing. ? · Your chest pain gets worse. ? · You are dizzy or lightheaded, or you feel like you may faint. ? · You are not getting better as expected. ? · You are having new or different chest pain. Where can you learn more? Go to http://mj-niels.info/. Enter A120 in the search box to learn more about \"Chest Pain: Care Instructions. \"  Current as of: March 20, 2017  Content Version: 11.4  © 4601-4508 [employer]. Care instructions adapted under license by [employer] (which disclaims liability or warranty for this information). If you have questions about a medical condition or this instruction, always ask your healthcare professional. [place] [address] any warranty or liability for your use of this information. We hope that we have addressed all of your medical concerns. The examination and treatment you received in the Emergency Department were for an emergent problem and were not intended as complete care. It is important that you follow up with your healthcare provider(s) for ongoing care. If your symptoms worsen or do not improve as expected, and you are unable to reach your usual health care provider(s), you should return to the Emergency Department. Today's healthcare is undergoing tremendous change, and patient satisfaction surveys are one of the many tools to assess the quality of medical care. You may receive a survey from the [employer] regarding your experience in the Emergency Department. I hope that your experience has been completely positive, particularly the medical care that I provided. As such, please participate in the survey; anything less than excellent does not meet my expectations or intentions.         [facility] and [employer] participate in nationally recognized quality of care measures. If your blood pressure is greater than 120/80, as reported below, we urge that you seek medical care to address the potential of high blood pressure, commonly known as hypertension. Hypertension can be hereditary or can be caused by certain medical conditions, pain, stress, or \"white coat syndrome. \"       Please make an appointment with your health care provider(s) for follow up of your Emergency Department visit. VITALS:   Patient Vitals for the past 8 hrs:   Temp Pulse Resp BP SpO2   11/27/17 1116 98 °F (36.7 °C) 92 22 134/80 97 %          Thank you for allowing us to provide you with medical care today. We realize that you have many choices for your emergency care needs. Please choose us in the future for any continued health care needs. Evangelista Ann , [address] [place], [place] [address]y 20.   Office: [phone]            Recent Results (from the past 24 hour(s))   CBC WITH AUTOMATED DIFF    Collection Time: 11/27/17 11:17 AM   Result Value Ref Range    WBC 6.2 4.1 - 11.1 K/uL    RBC 4.68 4.10 - 5.70 M/uL    HGB 14.6 12.1 - 17.0 g/dL    HCT 42.2 36.6 - 50.3 %    MCV 90.2 80.0 - 99.0 FL    MCH 31.2 26.0 - 34.0 PG    MCHC 34.6 30.0 - 36.5 g/dL    RDW 13.2 11.5 - 14.5 %    PLATELET 853 987 - 740 K/uL    NEUTROPHILS 67 32 - 75 %    LYMPHOCYTES 21 12 - 49 %    MONOCYTES 10 5 - 13 %    EOSINOPHILS 2 0 - 7 %    BASOPHILS 0 0 - 1 %    ABS. NEUTROPHILS 4.2 1.8 - 8.0 K/UL    ABS. LYMPHOCYTES 1.3 0.8 - 3.5 K/UL    ABS. MONOCYTES 0.6 0.0 - 1.0 K/UL    ABS. EOSINOPHILS 0.1 0.0 - 0.4 K/UL    ABS.  BASOPHILS 0.0 0.0 - 0.1 K/UL   METABOLIC PANEL, COMPREHENSIVE    Collection Time: 11/27/17 11:17 AM   Result Value Ref Range    Sodium 136 136 - 145 mmol/L    Potassium 4.1 3.5 - 5.1 mmol/L    Chloride 103 97 - 108 mmol/L    CO2 25 21 - 32 mmol/L    Anion gap 8 5 - 15 mmol/L    Glucose 71 65 - 100 mg/dL    BUN 29 (H) 6 - 20 MG/DL    Creatinine 1.30 0.70 - 1.30 MG/DL    BUN/Creatinine ratio 22 (H) 12 - 20      GFR est AA >60 >60 ml/min/1.73m2    GFR est non-AA >60 >60 ml/min/1.73m2    Calcium 9.2 8.5 - 10.1 MG/DL    Bilirubin, total 1.1 (H) 0.2 - 1.0 MG/DL    ALT (SGPT) 77 12 - 78 U/L    AST (SGOT) 25 15 - 37 U/L    Alk. phosphatase 97 45 - 117 U/L    Protein, total 8.7 (H) 6.4 - 8.2 g/dL    Albumin 4.6 3.5 - 5.0 g/dL    Globulin 4.1 (H) 2.0 - 4.0 g/dL    A-G Ratio 1.1 1.1 - 2.2     TROPONIN I    Collection Time: 11/27/17 11:17 AM   Result Value Ref Range    Troponin-I, Qt. <0.04 <0.05 ng/mL   CK W/ REFLX CKMB    Collection Time: 11/27/17 11:17 AM   Result Value Ref Range     39 - 308 U/L   NT-PRO BNP    Collection Time: 11/27/17 11:17 AM   Result Value Ref Range    NT pro- (H) 0 - 125 PG/ML   EKG, 12 LEAD, INITIAL    Collection Time: 11/27/17 11:20 AM   Result Value Ref Range    Ventricular Rate 84 BPM    Atrial Rate 84 BPM    P-R Interval 204 ms    QRS Duration 100 ms    Q-T Interval 388 ms    QTC Calculation (Bezet) 458 ms    Calculated P Axis 40 degrees    Calculated R Axis -173 degrees    Calculated T Axis 31 degrees    Diagnosis       Atrial-paced rhythm  Inferior infarct (cited on or before 14-NOV-2017)  When compared with ECG of 14-NOV-2017 17:36,  No significant change was found     DRUG SCREEN, URINE    Collection Time: 11/27/17 11:41 AM   Result Value Ref Range    AMPHETAMINES POSITIVE (A) NEG      BARBITURATES NEGATIVE  NEG      BENZODIAZEPINES NEGATIVE  NEG      COCAINE NEGATIVE  NEG      METHADONE NEGATIVE  NEG      OPIATES NEGATIVE  NEG      PCP(PHENCYCLIDINE) NEGATIVE  NEG      THC (TH-CANNABINOL) POSITIVE (A) NEG      Drug screen comment (NOTE)        Xr Chest Pa Lat    Result Date: 11/27/2017  Exam:  2 view chest Indication: Chest pain and shortness of breath Comparison to 11/14/2017. PA and lateral views demonstrate normal heart size. Pacer lead is unchanged in position.  There is no acute process in the lung fields. The osseous structures are unremarkable.      Impression: No acute process or change compared to the prior exam.

## 2017-11-27 NOTE — ED NOTES
Pt states, \"I'm having pain radiating more frequently to the right shoulder and I'm nauseated\". Dr. Maya Grande informed.

## 2017-11-27 NOTE — DISCHARGE INSTRUCTIONS
Chest Pain: Care Instructions  Your Care Instructions    There are many things that can cause chest pain. Some are not serious and will get better on their own in a few days. But some kinds of chest pain need more testing and treatment. Your doctor may have recommended a follow-up visit in the next 8 to 12 hours. If you are not getting better, you may need more tests or treatment. Even though your doctor has released you, you still need to watch for any problems. The doctor carefully checked you, but sometimes problems can develop later. If you have new symptoms or if your symptoms do not get better, get medical care right away. If you have worse or different chest pain or pressure that lasts more than 5 minutes or you passed out (lost consciousness), call 911 or seek other emergency help right away. A medical visit is only one step in your treatment. Even if you feel better, you still need to do what your doctor recommends, such as going to all suggested follow-up appointments and taking medicines exactly as directed. This will help you recover and help prevent future problems. How can you care for yourself at home? · Rest until you feel better. · Take your medicine exactly as prescribed. Call your doctor if you think you are having a problem with your medicine. · Do not drive after taking a prescription pain medicine. When should you call for help? Call 911 if:  ? · You passed out (lost consciousness). ? · You have severe difficulty breathing. ? · You have symptoms of a heart attack. These may include:  ¨ Chest pain or pressure, or a strange feeling in your chest.  ¨ Sweating. ¨ Shortness of breath. ¨ Nausea or vomiting. ¨ Pain, pressure, or a strange feeling in your back, neck, jaw, or upper belly or in one or both shoulders or arms. ¨ Lightheadedness or sudden weakness. ¨ A fast or irregular heartbeat.   After you call 911, the  may tell you to chew 1 adult-strength or 2 to 4 low-dose aspirin. Wait for an ambulance. Do not try to drive yourself. ?Call your doctor today if:  ? · You have any trouble breathing. ? · Your chest pain gets worse. ? · You are dizzy or lightheaded, or you feel like you may faint. ? · You are not getting better as expected. ? · You are having new or different chest pain. Where can you learn more? Go to http://mj-niels.info/. Enter A120 in the search box to learn more about \"Chest Pain: Care Instructions. \"  Current as of: March 20, 2017  Content Version: 11.4  © 0606-9032 [employer]. Care instructions adapted under license by [employer] (which disclaims liability or warranty for this information). If you have questions about a medical condition or this instruction, always ask your healthcare professional. [address] any warranty or liability for your use of this information.

## 2017-11-27 NOTE — ED PROVIDER NOTES
HPI Comments: 28 y.o. male with past medical history significant for asthma, CAD, stroke, heart failure with a pacemarker who presents from home via private vehicle with chief complaint of chest pain. Pt reports 9/10 midsternal chest pain for the last month radiating to his right arm, neck, and back with accompanying nausea and SOB. Pt reports increasing SOB with lower activity over the last week. Pt reports he is most recently been seeing [facility] for his heart problems, seeing Dr. Laxmi Velasco 3 months ago. Pt reports that was his last cardiac appointment, and he was told he needed to complete biometric screening to get on the heart transplant list. Pt reports he was also seeing a pediatric cardiology as well as [facility], who told him his ventricles were reversed and the top right part of his heart was inflamed and pressing on his chest wall. Pt had a normal cardiac catheterization this year. Pt denies seeing any doctor for this current episode of CP. There are no other acute medical concerns at this time. Social hx: Nonsmoker; No EtOH use  PCP: Jeane Wakefield NP    Note written by Marly Sanchez, as dictated by Vlad Morse MD 11:35 AM          The history is provided by the patient. No  was used. 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 No pertinent family history. Social History     Social History    Marital status:      Spouse name: N/A    Number of children: N/A    Years of education: N/A     Occupational History    Not on file.      Social History Main Topics    Smoking status: Never Smoker    Smokeless tobacco: Never Used      Comment: advised not to start    Alcohol use No    Drug use: No    Sexual activity: Yes     Partners: Female     Birth control/ protection: Condom     Other Topics Concern    Not on file     Social History Narrative         ALLERGIES: Betadine [povidone-iodine]    Review of Systems   Constitutional: Negative for diaphoresis and fever. Respiratory: Positive for shortness of breath. Cardiovascular: Positive for chest pain. Gastrointestinal: Positive for nausea. All other systems reviewed and are negative.       Vitals:    11/27/17 1114 11/27/17 1116   BP:  134/80   Pulse:  92   Resp:  22   Temp:  98 °F (36.7 °C)   SpO2:  97%   Weight: 75.3 kg (166 lb)    Height: 5' 7\" (1.702 m)             Physical Exam   Physical Examination: General appearance - alert, well appearing, and in no distress, oriented to person, place, and time and normal appearing weight  Eyes - pupils equal and reactive, extraocular eye movements intact  Neck - supple, no significant adenopathy  Chest - clear to auscultation, no wheezes, rales or rhonchi, symmetric air entry, pacer right upper chest, well healed midline surgical scar to chest, mild tenderness along left sternal border, no crepitus or subcutaneous air  Heart - normal rate, regular rhythm, normal S1, S2, no murmurs, rubs, clicks or gallops  Abdomen - soft, nontender, nondistended, no masses or organomegaly  Back exam - full range of motion, no tenderness, palpable spasm or pain on motion  Neurological - alert, oriented, normal speech, no focal findings or movement disorder noted  Musculoskeletal - no joint tenderness, deformity or swelling  Extremities - peripheral pulses normal, no pedal edema, no clubbing or cyanosis  Skin - normal coloration and turgor, no rashes, no suspicious skin lesions noted  MDM  Number of Diagnoses or Management Options  Chronic chest pain:   Congenital heart anomaly:      Amount and/or Complexity of Data Reviewed  Clinical lab tests: ordered and reviewed  Tests in the radiology section of CPT®: ordered and reviewed  Decide to obtain previous medical records or to obtain history from someone other than the patient: yes  Review and summarize past medical records: yes  Discuss the patient with other providers: yes (Cardiothoracic surgery)  Independent visualization of images, tracings, or specimens: yes    Patient Progress  Patient progress: stable    ED Course       Procedures    EKG interpretation: (Preliminary)  Rhythm: paced; and regular . Rate (approx.): 84; Axis: normal; CT interval: normal; QRS interval: prolonged; ST/T wave: non-specific changes; no changes from EKG 11/14/2017    Discussed with cardiothoracic surgeon at [facility], pt had no explainable cardiac etiology for chest pain. Has had extensive evaluation. Does have known congenital heart disease and low EF. Needs to f/u with team at [facility]. Pt needs to see pain management for control of chronic pain.

## 2017-11-27 NOTE — PROGRESS NOTES
Admission Medication Reconciliation:    Information obtained from:  Patient, RxQuery    Comments/Recommendations: All medications/allergies have been reviewed and updated; last medication administration times reviewed and recorded. Changes made to Prior to Admission (PTA) Medication List:   ?   Medications Added:   - None   ? Medications Changed:   - None   ? Medications Removed:   - famotidine, gabapentin, hydrocodone/APAP, hydroxyzine, mirtazepine, zolpidem        Significant PMH/Disease States:   Past Medical History:   Diagnosis Date    Asthma     CAD (coronary artery disease)     Gout     Heart failure ([address]     with pacemaker, states transition of great vessels    Pacemaker     Stroke [facility])     Transposition great arteries        Chief Complaint for this Admission:    Chief Complaint   Patient presents with    Chest Pain    Shortness of Breath       Allergies:  Betadine [povidone-iodine]    Prior to Admission Medications:   Prior to Admission Medications   Prescriptions Last Dose Informant Patient Reported? Taking? amphetamine-dextroamphetamine XR (ADDERALL XR) 20 mg XR capsule 11/27/2017 at AM  No Yes   Sig: Take 1 Cap (20 mg total) by mouth daily. Max Daily Amount: 20 mg   furosemide (LASIX) 20 mg tablet 11/27/2017 at Am  No Yes   Sig: Take 1 Tab by mouth two (2) times a day. lisinopril (PRINIVIL, ZESTRIL) 20 mg tablet 11/27/2017 at AM  Yes Yes   Sig: Take 20 mg by mouth daily. metoprolol succinate (TOPROL-XL) 50 mg XL tablet 11/27/2017 at AM  No Yes   Sig: Take 1 Tab by mouth daily. ondansetron (ZOFRAN ODT) 4 mg disintegrating tablet Unknown at Unknown time  No No   Sig: Take 1 Tab by mouth every eight (8) hours as needed for Nausea. spironolactone (ALDACTONE) 25 mg tablet 11/27/2017 at AM  No Yes   Sig: Take 1 Tab by mouth daily. Facility-Administered Medications: None     Thank you for allowing pharmacy to participate in the coordination of this patient's care.   If you have any other questions, please contact the medication reconciliation pharmacist at [phone]. Delores Newby D.

## 2017-11-27 NOTE — ED TRIAGE NOTES
Patient arrives for midline non radiating chest pain for a month. Reports it worsened in the last couple days. Patient has +cardaic history. Patient reports shortness of breath with ambulation. Patient has right arm spasms which is baseline post stroke.  PAtient on heart transplant list.

## 2017-11-28 LAB
ATRIAL RATE: 91 BPM
CALCULATED P AXIS, ECG09: 71 DEGREES
CALCULATED R AXIS, ECG10: 168 DEGREES
CALCULATED T AXIS, ECG11: 15 DEGREES
DIAGNOSIS, 93000: NORMAL
P-R INTERVAL, ECG05: 168 MS
Q-T INTERVAL, ECG07: 384 MS
QRS DURATION, ECG06: 100 MS
QTC CALCULATION (BEZET), ECG08: 472 MS
VENTRICULAR RATE, ECG03: 91 BPM

## 2017-12-05 RX ORDER — METOPROLOL SUCCINATE 50 MG/1
50 TABLET, EXTENDED RELEASE ORAL DAILY
Qty: 90 TAB | Refills: 0 | Status: SHIPPED | OUTPATIENT
Start: 2017-12-05

## 2017-12-11 ENCOUNTER — OFFICE VISIT (OUTPATIENT)
Dept: BEHAVIORAL/MENTAL HEALTH CLINIC | Age: 32
End: 2017-12-11

## 2017-12-11 VITALS
HEIGHT: 67 IN | SYSTOLIC BLOOD PRESSURE: 123 MMHG | DIASTOLIC BLOOD PRESSURE: 72 MMHG | WEIGHT: 167 LBS | HEART RATE: 85 BPM | BODY MASS INDEX: 26.21 KG/M2

## 2017-12-11 DIAGNOSIS — F41.8 ANXIETY ABOUT HEALTH: Primary | ICD-10-CM

## 2017-12-11 DIAGNOSIS — Z86.59 HISTORY OF ADHD: ICD-10-CM

## 2017-12-11 DIAGNOSIS — F41.0 PANIC ATTACK: ICD-10-CM

## 2017-12-11 RX ORDER — HYDROXYZINE PAMOATE 50 MG/1
50 CAPSULE ORAL
Qty: 90 CAP | Refills: 2 | Status: SHIPPED | OUTPATIENT
Start: 2017-12-11 | End: 2018-01-10

## 2017-12-11 RX ORDER — MIRTAZAPINE 15 MG/1
15 TABLET, ORALLY DISINTEGRATING ORAL
Qty: 30 TAB | Refills: 1 | Status: SHIPPED | OUTPATIENT
Start: 2017-12-11 | End: 2018-02-12 | Stop reason: SDUPTHER

## 2017-12-11 RX ORDER — HYDROCODONE BITARTRATE AND ACETAMINOPHEN 5; 325 MG/1; MG/1
TABLET ORAL
COMMUNITY
Start: 2017-11-29 | End: 2018-02-18

## 2017-12-11 RX ORDER — CLONAZEPAM 0.5 MG/1
0.5 TABLET ORAL
Qty: 30 TAB | Refills: 0 | Status: SHIPPED | OUTPATIENT
Start: 2017-12-11 | End: 2018-01-17 | Stop reason: SDUPTHER

## 2017-12-11 NOTE — MR AVS SNAPSHOT
Visit Information Date & Time Provider Department Dept. Phone Encounter #  
 12/11/2017  1:00 PM [facility], [address] [phone] [id number] Follow-up Instructions Return in about 8 weeks (around 2/5/2018). Follow-up and Disposition History Upcoming Health Maintenance Date Due Pneumococcal 19-64 Medium Risk (1 of 1 - PPSV23) 11/10/2004 DTaP/Tdap/Td series (1 - Tdap) 11/10/2006 Influenza Age 5 to Adult 8/1/2017 Allergies as of 12/11/2017  Review Complete On: 12/11/2017 By: Jesse Chang Severity Noted Reaction Type Reactions Betadine [Povidone-iodine]  03/19/2014    Rash Current Immunizations  Never Reviewed No immunizations on file. Not reviewed this visit You Were Diagnosed With   
  
 Codes Comments Anxiety about health    -  Primary ICD-10-CM: F41.8 ICD-9-CM: 300.09 Panic attack     ICD-10-CM: F41.0 ICD-9-CM: 300.01 History of ADHD     ICD-10-CM: Z86.59 
ICD-9-CM: V11.8 Vitals BP Pulse Height(growth percentile) Weight(growth percentile) BMI Smoking Status 123/72 85 5' 7\" (1.702 m) 167 lb (75.8 kg) 26.16 kg/m2 Never Smoker BMI and BSA Data Body Mass Index Body Surface Area  
 26.16 kg/m 2 1.89 m 2 Preferred Pharmacy Pharmacy Name Phone [id number] [phone] [facility] [phone] Your Updated Medication List  
  
   
This list is accurate as of: 12/11/17  1:34 PM.  Always use your most recent med list.  
  
  
  
  
 clonazePAM 0.5 mg tablet Commonly known as:  Lara Blaze Take 1 Tab by mouth daily as needed. furosemide 20 mg tablet Commonly known as:  LASIX Take 1 Tab by mouth two (2) times a day. HYDROcodone-acetaminophen 5-325 mg per tablet Commonly known as:  NORCO  
  
 hydrOXYzine pamoate 50 mg capsule Commonly known as:  VISTARIL  
 Take 1 Cap by mouth three (3) times daily as needed for Itching for up to 30 days. lisinopril 20 mg tablet Commonly known as:  Elva November Take 20 mg by mouth daily. metoprolol succinate 50 mg XL tablet Commonly known as:  TOPROL-XL Take 1 Tab by mouth daily. mirtazapine 15 mg disintegrating tablet Commonly known as:  REMERON SOL-TAB Take 1 Tab by mouth nightly. ondansetron 4 mg disintegrating tablet Commonly known as:  ZOFRAN ODT Take 1 Tab by mouth every eight (8) hours as needed for Nausea. spironolactone 25 mg tablet Commonly known as:  ALDACTONE Take 1 Tab by mouth daily. Prescriptions Printed Refills  
 clonazePAM (KLONOPIN) 0.5 mg tablet 0 Sig: Take 1 Tab by mouth daily as needed. Class: Print Route: Oral  
  
Prescriptions Sent to Pharmacy Refills  
 mirtazapine (REMERON SOL-TAB) 15 mg disintegrating tablet 1 Sig: Take 1 Tab by mouth nightly. Class: Normal  
 Pharmacy: [address] Ph #: [phone] Route: Oral  
 hydrOXYzine pamoate (VISTARIL) 50 mg capsule 2 Sig: Take 1 Cap by mouth three (3) times daily as needed for Itching for up to 30 days. Class: Normal  
 Pharmacy: [address] Ph #: [phone] Route: Oral  
  
Follow-up Instructions Return in about 8 weeks (around 2/5/2018). Introducing [facility]! Romina Gong introduces [employer] patient portal. Now you can access parts of your medical record, email your doctor's office, and request medication refills online. 1. In your internet browser, go to [email or web address] 2. Click on the First Time User? Click Here link in the Sign In box. You will see the New Member Sign Up page. 3. Enter your [employer] Access Code exactly as it appears below.  You will not need to use this code after youve completed the sign-up process. If you do not sign up before the expiration date, you must request a new code. · [employer] Access Code: [email or web address] Expires: 12/24/2017  2:47 PM 
 
4. Enter the last four digits of your Social Security Number (xxxx) and Date of Birth (mm/dd/yyyy) as indicated and click Submit. You will be taken to the next sign-up page. 5. Create a [employer] ID. This will be your [employer] login ID and cannot be changed, so think of one that is secure and easy to remember. 6. Create a [employer] password. You can change your password at any time. 7. Enter your Password Reset Question and Answer. This can be used at a later time if you forget your password. 8. Enter your e-mail address. You will receive e-mail notification when new information is available in [address] 9. Click Sign Up. You can now view and download portions of your medical record. 10. Click the Download Summary menu link to download a portable copy of your medical information. If you have questions, please visit the Frequently Asked Questions section of the [employer] website. Remember, [employer] is NOT to be used for urgent needs. For medical emergencies, dial 911. Now available from your iPhone and Android! Please provide this summary of care documentation to your next provider. Your primary care clinician is listed as Jules Box. If you have any questions after today's visit, please call [phone].

## 2017-12-11 NOTE — PROGRESS NOTES
CHIEF COMPLAINT:  Jazmyne Tran is a 28 y.o. male and was seen today for follow-up of psychiatric condition and psychotropic medication management. HPI:    HPI    Brooke Villalpando reports the following psychiatric symptoms:  anxiety and ADHD. The symptoms have been present for years and are of moderate/high severity. The symptoms occur daily. Pt is here today to discuss tx plan. FAMILY/SOCIAL HX: Syncope event that resulted in admission. Recently, went to [facility] for heart reevaluation. REVIEW OF SYSTEMS:  ROS    Psychiatric:  Anxiety, ADHD  Appetite:good   Sleep: poor with DIS (difficulty initiating sleep) due to racing thoughts  Neuro: Migraines, HA's, post stroke     Visit Vitals    /72    Pulse 85    Ht 5' 7\" (1.702 m)    Wt 75.8 kg (167 lb)    BMI 26.16 kg/m2       Side Effects:  none    MENTAL STATUS EXAM:   Sensorium  oriented to time, place and person   Relations cooperative   Appearance:  age appropriate and casually dressed   Motor Behavior:  within normal limits   Speech:  normal pitch and normal volume   Thought Process: goal directed   Thought Content free of delusions and free of hallucinations   Suicidal ideations no plan  and no intention   Homicidal ideations no plan  and no intention   Mood:  anxious   Affect:  depressed and euthymic   Memory recent  adequate   Memory remote:  adequate   Concentration:  adequate   Abstraction:  abstract   Insight:  fair and good   Reliability good and fair   Judgment:  fair and good     MEDICAL DECISION MAKING:  Problems addressed today:    ICD-10-CM ICD-9-CM    1. Anxiety about health F41.8 300.09    2. Panic attack F41.0 300.01    3. History of ADHD Z86.59 V11.8        Assessment:   Brooke Villalpando is responding to treatment. Pt does report benefit with remeron, vistaril and klonopin for anxiety/panic symptoms. Pt does report that he could have some slight depression but nothing that is functionally impairing.  Discussed the safety implications re: prescribing a stimulant in patients with cardiac ds. and pt verbalizes understanding. Discussed how anxiety can impact focus and concentration and the importance of treating anxiety symptoms. Pt verbalizes understanding of this process and agrees. Pt does report thatwas a hard transition to stop taking a stimulant but understands the rationale and agrees with it. Will increase vistaril to 50mg TID PRN anxiety to help better tx symptoms. Will continue klonopin and Remeron for tx of anxiety and panic symptoms. Provided support and encouragement. Total encounter time was 25 minutes; >50% of time was spent counseling/coordinating care regarding anxiety/panic/depression. Plan:   1. Current Outpatient Prescriptions   Medication Sig Dispense Refill    HYDROcodone-acetaminophen (NORCO) 5-325 mg per tablet       mirtazapine (REMERON SOL-TAB) 15 mg disintegrating tablet Take 1 Tab by mouth nightly. 30 Tab 1    clonazePAM (KLONOPIN) 0.5 mg tablet Take 1 Tab by mouth daily as needed. 30 Tab 0    hydrOXYzine pamoate (VISTARIL) 50 mg capsule Take 1 Cap by mouth three (3) times daily as needed for Itching for up to 30 days. 90 Cap 2    metoprolol succinate (TOPROL-XL) 50 mg XL tablet Take 1 Tab by mouth daily. 90 Tab 0    ondansetron (ZOFRAN ODT) 4 mg disintegrating tablet Take 1 Tab by mouth every eight (8) hours as needed for Nausea. 10 Tab 0    spironolactone (ALDACTONE) 25 mg tablet Take 1 Tab by mouth daily. 30 Tab 1    furosemide (LASIX) 20 mg tablet Take 1 Tab by mouth two (2) times a day. 30 Tab 1    lisinopril (PRINIVIL, ZESTRIL) 20 mg tablet Take 20 mg by mouth daily. medication changes made today: cont remeron 15mg for anxiety/depression/sleep, increase vistaril 50mg TID for anxiety/sleep, cont klonopin 0.5mg daily PRN for anxiety/panic attacks. 2.  Counseling and coordination of care including instructions for treatment, risks/benefits, risk factor reduction and patient/family education.  He agrees with the plan. Patient instructed to call with any side effects, questions or issues. 3.  Follow-up Disposition:  Return in about 8 weeks (around 2/5/2018).     12/11/2017  Shyla Nolan, NP

## 2018-01-02 NOTE — TELEPHONE ENCOUNTER
Called patient back, left message. No answer.
Pt called, returning Shyla's call. Please call back.
Speaking Coherently

## 2018-01-17 DIAGNOSIS — F41.8 ANXIETY ABOUT HEALTH: Primary | ICD-10-CM

## 2018-01-18 RX ORDER — CLONAZEPAM 0.5 MG/1
0.5 TABLET ORAL
Qty: 30 TAB | Refills: 0 | OUTPATIENT
Start: 2018-01-18 | End: 2018-02-12 | Stop reason: SDUPTHER

## 2018-02-12 ENCOUNTER — OFFICE VISIT (OUTPATIENT)
Dept: BEHAVIORAL/MENTAL HEALTH CLINIC | Age: 33
End: 2018-02-12

## 2018-02-12 VITALS
BODY MASS INDEX: 25.87 KG/M2 | HEART RATE: 87 BPM | SYSTOLIC BLOOD PRESSURE: 100 MMHG | DIASTOLIC BLOOD PRESSURE: 67 MMHG | HEIGHT: 67 IN | WEIGHT: 164.8 LBS

## 2018-02-12 DIAGNOSIS — F41.0 PANIC ATTACK: ICD-10-CM

## 2018-02-12 DIAGNOSIS — F06.4 ANXIETY DISORDER DUE TO MEDICAL CONDITION: Primary | ICD-10-CM

## 2018-02-12 DIAGNOSIS — F41.8 ANXIETY ABOUT HEALTH: ICD-10-CM

## 2018-02-12 DIAGNOSIS — Z86.59 HISTORY OF ADHD: ICD-10-CM

## 2018-02-12 RX ORDER — MIRTAZAPINE 15 MG/1
15 TABLET, ORALLY DISINTEGRATING ORAL
Qty: 30 TAB | Refills: 2 | Status: SHIPPED | OUTPATIENT
Start: 2018-02-12 | End: 2018-02-13 | Stop reason: SDUPTHER

## 2018-02-12 RX ORDER — BUSPIRONE HYDROCHLORIDE 7.5 MG/1
7.5 TABLET ORAL 2 TIMES DAILY
Qty: 60 TAB | Refills: 1 | Status: SHIPPED | OUTPATIENT
Start: 2018-02-12 | End: 2018-04-03 | Stop reason: SDUPTHER

## 2018-02-12 RX ORDER — CLONAZEPAM 0.5 MG/1
0.5 TABLET ORAL
Qty: 30 TAB | Refills: 1 | Status: SHIPPED | OUTPATIENT
Start: 2018-02-15 | End: 2018-05-08 | Stop reason: SDUPTHER

## 2018-02-12 NOTE — MR AVS SNAPSHOT
[address] 
[phone] Patient: Leah Barron MRN: [id number] Visit Information Date & Time Provider Department Dept. Phone Encounter #  
 2/12/2018 11:00 AM [address], NP [facility] [phone] [id number] Follow-up Instructions Return in about 8 weeks (around 4/9/2018). Follow-up and Disposition History Upcoming Health Maintenance Date Due Pneumococcal 19-64 Medium Risk (1 of 1 - PPSV23) 11/10/2004 DTaP/Tdap/Td series (1 - Tdap) 11/10/2006 Influenza Age 5 to Adult 8/1/2017 Allergies as of 2/12/2018  Review Complete On: 2/12/2018 By: [address], NP Severity Noted Reaction Type Reactions Betadine [Povidone-iodine]  03/19/2014    Rash Current Immunizations  Never Reviewed No immunizations on file. Not reviewed this visit You Were Diagnosed With   
  
 Codes Comments Anxiety disorder due to medical condition    -  Primary ICD-10-CM: F06.4 ICD-9-CM: 293.84 Panic attack     ICD-10-CM: F41.0 ICD-9-CM: 300.01 History of ADHD     ICD-10-CM: Z86.59 
ICD-9-CM: V11.8 Anxiety about health     ICD-10-CM: F41.8 ICD-9-CM: 300.09 Vitals BP Pulse Height(growth percentile) Weight(growth percentile) BMI Smoking Status 100/67 87 5' 7\" (1.702 m) 164 lb 12.8 oz (74.8 kg) 25.81 kg/m2 Never Smoker BMI and BSA Data Body Mass Index Body Surface Area  
 25.81 kg/m 2 1.88 m 2 Preferred Pharmacy Pharmacy Name Phone [id number] [phone] Your Updated Medication List  
  
   
This list is accurate as of: 2/12/18 11:31 AM.  Always use your most recent med list.  
  
  
  
  
 busPIRone 7.5 mg tablet Commonly known as:  BUSPAR Take 1 Tab by mouth two (2) times a day. clonazePAM 0.5 mg tablet Commonly known as:  [place] Take 1 Tab by mouth daily as needed. Start taking on:  2/15/2018  
  
 furosemide 20 mg tablet Commonly known as:  LASIX Take 1 Tab by mouth two (2) times a day. HYDROcodone-acetaminophen 5-325 mg per tablet Commonly known as:  NORCO  
  
 lisinopril 20 mg tablet Commonly known as:  Metta Lawman Take 20 mg by mouth daily. metoprolol succinate 50 mg XL tablet Commonly known as:  TOPROL-XL Take 1 Tab by mouth daily. mirtazapine 15 mg disintegrating tablet Commonly known as:  REMERON SOL-TAB Take 1 Tab by mouth nightly. ondansetron 4 mg disintegrating tablet Commonly known as:  ZOFRAN ODT Take 1 Tab by mouth every eight (8) hours as needed for Nausea. spironolactone 25 mg tablet Commonly known as:  ALDACTONE Take 1 Tab by mouth daily. Prescriptions Printed Refills  
 clonazePAM (KLONOPIN) 0.5 mg tablet 1 Starting on: 2/15/2018 Sig: Take 1 Tab by mouth daily as needed. Class: Print Route: Oral  
  
Prescriptions Sent to Pharmacy Refills  
 mirtazapine (REMERON SOL-TAB) 15 mg disintegrating tablet 2 Sig: Take 1 Tab by mouth nightly. Class: Normal  
 Pharmacy: [address] Ph #: [phone] Route: Oral  
 busPIRone (BUSPAR) 7.5 mg tablet 1 Sig: Take 1 Tab by mouth two (2) times a day. Class: Normal  
 Pharmacy: [address] Ph #: [phone] Route: Oral  
  
Follow-up Instructions Return in about 8 weeks (around 4/9/2018). Introducing [employer] introduces WiMi5 patient portal. Now you can access parts of your medical record, email your doctor's office, and request medication refills online.    
 
1. In your internet browser, go to [email or web address] 2. Click on the First Time User? Click Here link in the Sign In box. You will see the New Member Sign Up page. 3. Enter your [facility] Access Code exactly as it appears below. You will not need to use this code after youve completed the sign-up process. If you do not sign up before the expiration date, you must request a new code. · [facility] Access Code: [email or web address] Expires: 5/13/2018 11:31 AM 
 
4. Enter the last four digits of your Social Security Number (xxxx) and Date of Birth (mm/dd/yyyy) as indicated and click Submit. You will be taken to the next sign-up page. 5. Create a [employer] ID. This will be your [facility] login ID and cannot be changed, so think of one that is secure and easy to remember. 6. Create a [facility] password. You can change your password at any time. 7. Enter your Password Reset Question and Answer. This can be used at a later time if you forget your password. 8. Enter your e-mail address. You will receive e-mail notification when new information is available in [address] 9. Click Sign Up. You can now view and download portions of your medical record. 10. Click the Download Summary menu link to download a portable copy of your medical information. If you have questions, please visit the Frequently Asked Questions section of the [facility] website. Remember, [facility] is NOT to be used for urgent needs. For medical emergencies, dial 911. Now available from your iPhone and Android! Please provide this summary of care documentation to your next provider. Your primary care clinician is listed as Saad Hendrix. If you have any questions after today's visit, please call [phone].

## 2018-02-13 RX ORDER — MIRTAZAPINE 15 MG/1
15 TABLET, ORALLY DISINTEGRATING ORAL
Qty: 90 TAB | Refills: 0 | Status: SHIPPED | OUTPATIENT
Start: 2018-02-13 | End: 2018-02-19

## 2018-02-18 ENCOUNTER — APPOINTMENT (OUTPATIENT)
Dept: CT IMAGING | Age: 33
End: 2018-02-18
Attending: EMERGENCY MEDICINE
Payer: COMMERCIAL

## 2018-02-18 ENCOUNTER — HOSPITAL ENCOUNTER (EMERGENCY)
Age: 33
Discharge: HOME | End: 2018-02-18
Attending: EMERGENCY MEDICINE | Admitting: EMERGENCY MEDICINE
Payer: COMMERCIAL

## 2018-02-18 VITALS
HEART RATE: 85 BPM | OXYGEN SATURATION: 98 % | WEIGHT: 160 LBS | TEMPERATURE: 98 F | BODY MASS INDEX: 25.11 KG/M2 | DIASTOLIC BLOOD PRESSURE: 67 MMHG | RESPIRATION RATE: 15 BRPM | HEIGHT: 67 IN | SYSTOLIC BLOOD PRESSURE: 106 MMHG

## 2018-02-18 DIAGNOSIS — Z76.5 DRUG-SEEKING BEHAVIOR: ICD-10-CM

## 2018-02-18 DIAGNOSIS — R10.9 FLANK PAIN: Primary | ICD-10-CM

## 2018-02-18 LAB
ALBUMIN SERPL-MCNC: 4 G/DL (ref 3.5–5)
ALBUMIN/GLOB SERPL: 1.2 {RATIO} (ref 1.1–2.2)
ALP SERPL-CCNC: 64 U/L (ref 45–117)
ALT SERPL-CCNC: 43 U/L (ref 12–78)
ANION GAP SERPL CALC-SCNC: 6 MMOL/L (ref 5–15)
AST SERPL-CCNC: 24 U/L (ref 15–37)
BASOPHILS # BLD: 0 K/UL (ref 0–0.1)
BASOPHILS NFR BLD: 1 % (ref 0–1)
BILIRUB SERPL-MCNC: 0.7 MG/DL (ref 0.2–1)
BUN SERPL-MCNC: 9 MG/DL (ref 6–20)
BUN/CREAT SERPL: 8 (ref 12–20)
CALCIUM SERPL-MCNC: 8.6 MG/DL (ref 8.5–10.1)
CHLORIDE SERPL-SCNC: 110 MMOL/L (ref 97–108)
CK SERPL-CCNC: 114 U/L (ref 39–308)
CO2 SERPL-SCNC: 26 MMOL/L (ref 21–32)
CREAT SERPL-MCNC: 1.14 MG/DL (ref 0.7–1.3)
DIFFERENTIAL METHOD BLD: ABNORMAL
EOSINOPHIL # BLD: 0.1 K/UL (ref 0–0.4)
EOSINOPHIL NFR BLD: 2 % (ref 0–7)
ERYTHROCYTE [DISTWIDTH] IN BLOOD BY AUTOMATED COUNT: 12.9 % (ref 11.5–14.5)
GLOBULIN SER CALC-MCNC: 3.3 G/DL (ref 2–4)
GLUCOSE SERPL-MCNC: 74 MG/DL (ref 65–100)
HCT VFR BLD AUTO: 32.6 % (ref 36.6–50.3)
HGB BLD-MCNC: 11.4 G/DL (ref 12.1–17)
IMM GRANULOCYTES # BLD: 0 K/UL (ref 0–0.04)
IMM GRANULOCYTES NFR BLD AUTO: 0 % (ref 0–0.5)
LYMPHOCYTES # BLD: 1.4 K/UL (ref 0.8–3.5)
LYMPHOCYTES NFR BLD: 26 % (ref 12–49)
MCH RBC QN AUTO: 30.9 PG (ref 26–34)
MCHC RBC AUTO-ENTMCNC: 35 G/DL (ref 30–36.5)
MCV RBC AUTO: 88.3 FL (ref 80–99)
MONOCYTES # BLD: 0.4 K/UL (ref 0–1)
MONOCYTES NFR BLD: 7 % (ref 5–13)
NEUTS SEG # BLD: 3.6 K/UL (ref 1.8–8)
NEUTS SEG NFR BLD: 65 % (ref 32–75)
NRBC # BLD: 0 K/UL (ref 0–0.01)
NRBC BLD-RTO: 0 PER 100 WBC
PLATELET # BLD AUTO: 156 K/UL (ref 150–400)
PMV BLD AUTO: 10.7 FL (ref 8.9–12.9)
POTASSIUM SERPL-SCNC: 3.7 MMOL/L (ref 3.5–5.1)
PROT SERPL-MCNC: 7.3 G/DL (ref 6.4–8.2)
RBC # BLD AUTO: 3.69 M/UL (ref 4.1–5.7)
SODIUM SERPL-SCNC: 142 MMOL/L (ref 136–145)
TROPONIN I SERPL-MCNC: <0.04 NG/ML
WBC # BLD AUTO: 5.5 K/UL (ref 4.1–11.1)

## 2018-02-18 PROCEDURE — 84484 ASSAY OF TROPONIN QUANT: CPT | Performed by: EMERGENCY MEDICINE

## 2018-02-18 PROCEDURE — 93005 ELECTROCARDIOGRAM TRACING: CPT

## 2018-02-18 PROCEDURE — 82550 ASSAY OF CK (CPK): CPT | Performed by: EMERGENCY MEDICINE

## 2018-02-18 PROCEDURE — 99285 EMERGENCY DEPT VISIT HI MDM: CPT

## 2018-02-18 PROCEDURE — 80053 COMPREHEN METABOLIC PANEL: CPT | Performed by: EMERGENCY MEDICINE

## 2018-02-18 PROCEDURE — 85025 COMPLETE CBC W/AUTO DIFF WBC: CPT | Performed by: EMERGENCY MEDICINE

## 2018-02-18 PROCEDURE — 74011250637 HC RX REV CODE- 250/637: Performed by: EMERGENCY MEDICINE

## 2018-02-18 PROCEDURE — 74176 CT ABD & PELVIS W/O CONTRAST: CPT

## 2018-02-18 PROCEDURE — 36415 COLL VENOUS BLD VENIPUNCTURE: CPT | Performed by: EMERGENCY MEDICINE

## 2018-02-18 RX ORDER — OXYCODONE AND ACETAMINOPHEN 5; 325 MG/1; MG/1
1 TABLET ORAL
Qty: 10 TAB | Refills: 0 | Status: SHIPPED | OUTPATIENT
Start: 2018-02-18 | End: 2018-06-18 | Stop reason: ALTCHOICE

## 2018-02-18 RX ORDER — PROMETHAZINE HYDROCHLORIDE 25 MG/1
25 TABLET ORAL
Qty: 12 TAB | Refills: 0 | Status: SHIPPED | OUTPATIENT
Start: 2018-02-18 | End: 2018-02-19

## 2018-02-18 RX ORDER — OXYCODONE AND ACETAMINOPHEN 5; 325 MG/1; MG/1
1 TABLET ORAL
Status: COMPLETED | OUTPATIENT
Start: 2018-02-18 | End: 2018-02-18

## 2018-02-18 RX ADMIN — OXYCODONE HYDROCHLORIDE AND ACETAMINOPHEN 1 TABLET: 5; 325 TABLET ORAL at 22:42

## 2018-02-18 NOTE — ED PROVIDER NOTES
EMERGENCY DEPARTMENT HISTORY AND PHYSICAL EXAM      Date: 2/18/2018  Patient Name: Rachel Tomas    History of Presenting Illness     Chief Complaint   Patient presents with    Chest Pain     stopped taking meds per previous hospital    Flank Pain     left flank pain       History Provided By: Patient    HPI: Rachel Tomas, 28 y.o. male with PMHx significant for transposition of great arteries, presents via EMS to the ED with cc of moderate left flank pain x 1 week and worsening chronic CP. He reports lower back pain, nausea, and bilateral leg cramps. Pt also reports waking up with difficulty breathing. Pt reports that he was discharged last week from [facility] after a 4 day stay for kidney stones and elevated creatine. He does not believe that he passed the kidney stone. Pt states he was taken off his furosemide due to his lowered kidney function. He reports that he called [facility] yesterday and has an appointment with a cardiologist March 21. Pt states he is on Norco for pain and has a pain management appointment scheduled for tomorrow. PCP: Peter Servin NP    There are no other complaints, changes, or physical findings at this time. Current Facility-Administered Medications   Medication Dose Route Frequency Provider Last Rate Last Dose    oxyCODONE-acetaminophen (PERCOCET) 5-325 mg per tablet 1 Tab  1 Tab Oral NOW Elysia Gandara MD         Current Outpatient Prescriptions   Medication Sig Dispense Refill    oxyCODONE-acetaminophen (PERCOCET) 5-325 mg per tablet Take 1 Tab by mouth every six (6) hours as needed for Pain. Max Daily Amount: 4 Tabs. 10 Tab 0    promethazine (PHENERGAN) 25 mg tablet Take 1 Tab by mouth every six (6) hours as needed. 12 Tab 0    mirtazapine (REMERON SOL-TAB) 15 mg disintegrating tablet Take 1 Tab by mouth nightly. 90 Tab 0    clonazePAM (KLONOPIN) 0.5 mg tablet Take 1 Tab by mouth daily as needed.  30 Tab 1    busPIRone (BUSPAR) 7.5 mg tablet Take 1 Tab by mouth two (2) times a day. 60 Tab 1    metoprolol succinate (TOPROL-XL) 50 mg XL tablet Take 1 Tab by mouth daily. 90 Tab 0    ondansetron (ZOFRAN ODT) 4 mg disintegrating tablet Take 1 Tab by mouth every eight (8) hours as needed for Nausea. 10 Tab 0    spironolactone (ALDACTONE) 25 mg tablet Take 1 Tab by mouth daily. 30 Tab 1    lisinopril (PRINIVIL, ZESTRIL) 20 mg tablet Take 20 mg by mouth daily. Past History     Past Medical History:  Past Medical History:   Diagnosis Date    Asthma     CAD (coronary artery disease)     Gout     Heart failure ([address]     with pacemaker, states transition of great vessels    Pacemaker     Stroke [facility])     Transposition great arteries        Past Surgical History:  Past Surgical History:   Procedure Laterality Date    HX PACEMAKER         Family History:  History reviewed. No pertinent family history. Social History:  Social History   Substance Use Topics    Smoking status: Never Smoker    Smokeless tobacco: Never Used      Comment: advised not to start    Alcohol use No       Allergies: Allergies   Allergen Reactions    Betadine [Povidone-Iodine] Rash         Review of Systems   Review of Systems   Constitutional: Negative. Negative for chills and fever. HENT: Positive for congestion. Negative for rhinorrhea and sinus pressure. Eyes: Negative. Negative for discharge and redness. Respiratory: Negative. Negative for chest tightness and shortness of breath. Cardiovascular: Positive for chest pain. Gastrointestinal: Positive for nausea. Negative for abdominal pain and blood in stool. Endocrine: Negative. Genitourinary: Positive for flank pain (left). Negative for hematuria. Musculoskeletal: Positive for back pain (lower) and myalgias (leg cramps). Skin: Negative. Negative for rash. Neurological: Negative. Negative for dizziness, seizures, weakness, numbness and headaches. Hematological: Negative.     All other systems reviewed and are negative. Physical Exam   Physical Exam   Constitutional: He is oriented to person, place, and time. He appears well-developed and well-nourished. No distress. HENT:   Head: Normocephalic and atraumatic. Nose: Nose normal.   Mouth/Throat: No oropharyngeal exudate. Eyes: Conjunctivae and EOM are normal. Pupils are equal, round, and reactive to light. No scleral icterus. Neck: Normal range of motion. Neck supple. No JVD present. No thyromegaly present. Cardiovascular: Normal rate, regular rhythm, normal heart sounds, intact distal pulses and normal pulses. PMI is not displaced. Exam reveals no gallop and no friction rub. No murmur heard. Pulmonary/Chest: Effort normal and breath sounds normal. No stridor. No respiratory distress. He has no decreased breath sounds. He has no wheezes. He has no rhonchi. He has no rales. He exhibits no tenderness. Abdominal: Soft. Normal aorta and bowel sounds are normal. He exhibits no distension, no abdominal bruit and no mass. There is no hepatosplenomegaly. There is no tenderness. There is no rebound, no guarding and no CVA tenderness. No hernia. Neurological: He is alert and oriented to person, place, and time. He has normal reflexes. He displays no atrophy and no tremor. No cranial nerve deficit or sensory deficit. He exhibits normal muscle tone. He displays no seizure activity. Coordination normal. GCS eye subscore is 4. GCS verbal subscore is 5. GCS motor subscore is 6. Reflex Scores:       Patellar reflexes are 2+ on the right side and 2+ on the left side. Skin: Skin is warm. No rash noted. He is not diaphoretic. No erythema. Nursing note and vitals reviewed.         Diagnostic Study Results     Labs -     Recent Results (from the past 12 hour(s))   EKG, 12 LEAD, INITIAL    Collection Time: 02/18/18  7:02 PM   Result Value Ref Range    Ventricular Rate 84 BPM    Atrial Rate 84 BPM    P-R Interval 206 ms    QRS Duration 108 ms    Q-T Interval 418 ms    QTC Calculation (Bezet) 493 ms    Calculated P Axis 109 degrees    Calculated R Axis 142 degrees    Calculated T Axis 7 degrees    Diagnosis       ** Suspect arm lead reversal, interpretation assumes no reversal  Atrial-paced rhythm  Pulmonary disease pattern  Right bundle branch block , plus right ventricular hypertrophy  Cannot rule out Inferior infarct (cited on or before 18-FEB-2018)  When compared with ECG of 27-NOV-2017 16:50,  No significant change was found     CBC WITH AUTOMATED DIFF    Collection Time: 02/18/18  7:20 PM   Result Value Ref Range    WBC 5.5 4.1 - 11.1 K/uL    RBC 3.69 (L) 4.10 - 5.70 M/uL    HGB 11.4 (L) 12.1 - 17.0 g/dL    HCT 32.6 (L) 36.6 - 50.3 %    MCV 88.3 80.0 - 99.0 FL    MCH 30.9 26.0 - 34.0 PG    MCHC 35.0 30.0 - 36.5 g/dL    RDW 12.9 11.5 - 14.5 %    PLATELET 773 588 - 834 K/uL    MPV 10.7 8.9 - 12.9 FL    NRBC 0.0 0  WBC    ABSOLUTE NRBC 0.00 0.00 - 0.01 K/uL    NEUTROPHILS 65 32 - 75 %    LYMPHOCYTES 26 12 - 49 %    MONOCYTES 7 5 - 13 %    EOSINOPHILS 2 0 - 7 %    BASOPHILS 1 0 - 1 %    IMMATURE GRANULOCYTES 0 0.0 - 0.5 %    ABS. NEUTROPHILS 3.6 1.8 - 8.0 K/UL    ABS. LYMPHOCYTES 1.4 0.8 - 3.5 K/UL    ABS. MONOCYTES 0.4 0.0 - 1.0 K/UL    ABS. EOSINOPHILS 0.1 0.0 - 0.4 K/UL    ABS. BASOPHILS 0.0 0.0 - 0.1 K/UL    ABS. IMM.  GRANS. 0.0 0.00 - 0.04 K/UL    DF AUTOMATED     CK W/ REFLX CKMB    Collection Time: 02/18/18  8:03 PM   Result Value Ref Range     39 - 308 U/L   TROPONIN I    Collection Time: 02/18/18  8:03 PM   Result Value Ref Range    Troponin-I, Qt. <0.04 <0.15 ng/mL   METABOLIC PANEL, COMPREHENSIVE    Collection Time: 02/18/18  8:03 PM   Result Value Ref Range    Sodium 142 136 - 145 mmol/L    Potassium 3.7 3.5 - 5.1 mmol/L    Chloride 110 (H) 97 - 108 mmol/L    CO2 26 21 - 32 mmol/L    Anion gap 6 5 - 15 mmol/L    Glucose 74 65 - 100 mg/dL    BUN 9 6 - 20 MG/DL    Creatinine 1.14 0.70 - 1.30 MG/DL    BUN/Creatinine ratio 8 (L) 12 - 20 GFR est AA >60 >60 ml/min/1.73m2    GFR est non-AA >60 >60 ml/min/1.73m2    Calcium 8.6 8.5 - 10.1 MG/DL    Bilirubin, total 0.7 0.2 - 1.0 MG/DL    ALT (SGPT) 43 12 - 78 U/L    AST (SGOT) 24 15 - 37 U/L    Alk. phosphatase 64 45 - 117 U/L    Protein, total 7.3 6.4 - 8.2 g/dL    Albumin 4.0 3.5 - 5.0 g/dL    Globulin 3.3 2.0 - 4.0 g/dL    A-G Ratio 1.2 1.1 - 2.2         Radiologic Studies -   CT Results  (Last 48 hours)               02/18/18 2125  CT ABD PELV WO CONT Final result    Impression:  IMPRESSION:   Punctate bilateral nonobstructing renal calculi. No ureteral calculus. No   urinary tract obstruction. Narrative:  EXAM:  CT ABD PELV WO CONT       INDICATION: left flank pain         COMPARISON: None       CONTRAST:  None. TECHNIQUE:    Thin axial images were obtained through the abdomen and pelvis. Coronal and   sagittal reconstructions were generated. Oral contrast was not administered. CT   dose reduction was achieved through use of a standardized protocol tailored for   this examination and automatic exposure control for dose modulation. The absence of intravenous contrast material reduces the sensitivity for   evaluation of the solid parenchymal organs of the abdomen. FINDINGS:    LUNG BASES: Clear. INCIDENTALLY IMAGED HEART AND MEDIASTINUM: Unremarkable. LIVER: No mass or biliary dilatation. GALLBLADDER: Unremarkable. SPLEEN: No mass. PANCREAS: No mass or ductal dilatation. ADRENALS: Unremarkable. KIDNEYS/URETERS: No mass or hydronephrosis. Punctate bilateral nonobstructing   renal calculi. No ureteral calculi. STOMACH: Unremarkable. SMALL BOWEL: No dilatation or wall thickening. COLON: No dilatation or wall thickening. APPENDIX: Normal   PERITONEUM: No ascites or pneumoperitoneum. RETROPERITONEUM: No lymphadenopathy or aortic aneurysm. REPRODUCTIVE ORGANS: Normal sized prostate gland. URINARY BLADDER: No mass or calculus.    BONES: No destructive bone lesion. ADDITIONAL COMMENTS: N/A                 Medical Decision Making   I am the first provider for this patient. I reviewed the vital signs, available nursing notes, past medical history, past surgical history, family history and social history. Vital Signs-Reviewed the patient's vital signs. Patient Vitals for the past 12 hrs:   Temp Pulse Resp BP SpO2   02/18/18 2133 - 86 14 - 100 %   02/18/18 2132 - - - 116/69 -   02/18/18 2100 - 85 12 114/74 98 %   02/18/18 2030 - 85 16 106/70 97 %   02/18/18 2000 - 85 15 114/66 99 %   02/18/18 1930 - 85 16 113/68 97 %   02/18/18 1900 - 85 13 114/67 98 %   02/18/18 1848 - 85 14 126/76 100 %   02/18/18 1845 98 °F (36.7 °C) 85 19 126/76 99 %       EKG interpretation: (Preliminary) 19:02  Rhythm: atrial paced rhythm and RBBB. Rate (approx.): 84;  Other findings: pulmonary disease pattern, right ventricular hypertrophy; no change from 11/28/17. Written by ELIJAH Morales, as dictated by Ann Garcia MD.    Records Reviewed: Old Medical Records    Provider Notes (Medical Decision Making):   DDx: coronary syndrome, CHF, PE, renal colic, hepatitis, pancreatitis, drug seeking behavior    Impression/plan: complex cardiac hx with recent admit for apparent kidney stone at [facility]. Here complaining of CP and flank pain. Review of records shows multiple visits for CP. Recent visit here was offered extensive work but refused because he was not given narcotics. Informed pt again today unless we had reason to treat acute pain, pain management would be best treated by pain manage physician. Labs and CT normal, no acute cardiac issue. ED Course:   Initial assessment performed. The patients presenting problems have been discussed, and they are in agreement with the care plan formulated and outlined with them. I have encouraged them to ask questions as they arise throughout their visit.     9:15 PM  Attempted multiple times to obtain records from [facility] but was unsuccessful. 9:30 PM  Pt no longer concerned about CP. Would like to be checked for kidney stones. Will CT. Informed pt I cant give pain medication unless CT shows abnormality. 10:20 PM  Pt was very confused why his bloodwork and CT showed no kidney stone. Discussed results. No obvious need to treat long term for pain. Will give one dose of oral analgesia. Further pain management by pcp or pain specialist.     Disposition:  Discharge Note:  10:25 PM  The pt is ready for discharge. The pt's signs, symptoms, diagnosis, and discharge instructions have been discussed and pt has conveyed their understanding. The pt is to follow up as recommended or return to ER should their symptoms worsen. Plan has been discussed and pt is in agreement. PLAN:  1. Current Discharge Medication List      START taking these medications    Details   oxyCODONE-acetaminophen (PERCOCET) 5-325 mg per tablet Take 1 Tab by mouth every six (6) hours as needed for Pain. Max Daily Amount: 4 Tabs. Qty: 10 Tab, Refills: 0    Associated Diagnoses: Flank pain      promethazine (PHENERGAN) 25 mg tablet Take 1 Tab by mouth every six (6) hours as needed. Qty: 12 Tab, Refills: 0           2. Follow-up Information     Follow up With Details Comments [facility] MD NIMISHA Schedule an appointment as soon as possible for a visit in 1 day  [facility] [address]      Hildy Holter, NP Schedule an appointment as soon as possible for a visit in 1 day  [facility]  [phone]      [facility] EMERGENCY DEPT  If symptoms worsen [address] [place] [address]  [phone]        Return to ED if worse     Diagnosis     Clinical Impression:   1. Flank pain    2. Drug-seeking behavior        Attestations:     This note is prepared by Abiola Galo, acting as a Scribe for [employer] MD Rajwinder Lomeli MD: The scribe's documentation has been prepared under my direction and personally reviewed by me in its entirety. I confirm that the notes above accurately reflects all work, treatment, procedures, and medical decision making performed by me.

## 2018-02-19 ENCOUNTER — HOSPITAL ENCOUNTER (EMERGENCY)
Age: 33
Discharge: HOME | End: 2018-02-19
Attending: EMERGENCY MEDICINE
Payer: COMMERCIAL

## 2018-02-19 VITALS
HEART RATE: 85 BPM | BODY MASS INDEX: 25.11 KG/M2 | RESPIRATION RATE: 16 BRPM | HEIGHT: 67 IN | SYSTOLIC BLOOD PRESSURE: 107 MMHG | OXYGEN SATURATION: 100 % | TEMPERATURE: 98 F | DIASTOLIC BLOOD PRESSURE: 70 MMHG | WEIGHT: 160 LBS

## 2018-02-19 DIAGNOSIS — G89.29 OTHER CHRONIC PAIN: Primary | ICD-10-CM

## 2018-02-19 DIAGNOSIS — R10.9 FLANK PAIN: ICD-10-CM

## 2018-02-19 LAB
ATRIAL RATE: 84 BPM
CALCULATED P AXIS, ECG09: 109 DEGREES
CALCULATED R AXIS, ECG10: 142 DEGREES
CALCULATED T AXIS, ECG11: 7 DEGREES
DIAGNOSIS, 93000: NORMAL
P-R INTERVAL, ECG05: 206 MS
Q-T INTERVAL, ECG07: 418 MS
QRS DURATION, ECG06: 108 MS
QTC CALCULATION (BEZET), ECG08: 493 MS
VENTRICULAR RATE, ECG03: 84 BPM

## 2018-02-19 PROCEDURE — 99283 EMERGENCY DEPT VISIT LOW MDM: CPT

## 2018-02-19 RX ORDER — ACETAMINOPHEN 325 MG/1
975 TABLET ORAL
Status: DISCONTINUED | OUTPATIENT
Start: 2018-02-19 | End: 2018-02-19

## 2018-02-19 RX ORDER — ZOLPIDEM TARTRATE 5 MG/1
5 TABLET ORAL
COMMUNITY
End: 2018-06-18 | Stop reason: ALTCHOICE

## 2018-02-19 NOTE — ED TRIAGE NOTES
Patient arrives for pain chronic in nature. Patient reports left flank pain for three days. Patient is scheduled to see pain management in March. Patient repots that they have changed a lot of his medications recently and he hasn't been feeling well since then. Reports blood in urine and past history of kidney stones. No acute distress noted in triage. Patient seen for same complaints yesterday at [address] Patient states percocet \"doesnt help\" and he would like something stronger for pain.

## 2018-02-19 NOTE — DISCHARGE INSTRUCTIONS
Abdominal Pain: Care Instructions  Your Care Instructions    Abdominal pain has many possible causes. Some aren't serious and get better on their own in a few days. Others need more testing and treatment. If your pain continues or gets worse, you need to be rechecked and may need more tests to find out what is wrong. You may need surgery to correct the problem. Don't ignore new symptoms, such as fever, nausea and vomiting, urination problems, pain that gets worse, and dizziness. These may be signs of a more serious problem. Your doctor may have recommended a follow-up visit in the next 8 to 12 hours. If you are not getting better, you may need more tests or treatment. The doctor has checked you carefully, but problems can develop later. If you notice any problems or new symptoms, get medical treatment right away. Follow-up care is a key part of your treatment and safety. Be sure to make and go to all appointments, and call your doctor if you are having problems. It's also a good idea to know your test results and keep a list of the medicines you take. How can you care for yourself at home? · Rest until you feel better. · To prevent dehydration, drink plenty of fluids, enough so that your urine is light yellow or clear like water. Choose water and other caffeine-free clear liquids until you feel better. If you have kidney, heart, or liver disease and have to limit fluids, talk with your doctor before you increase the amount of fluids you drink. · If your stomach is upset, eat mild foods, such as rice, dry toast or crackers, bananas, and applesauce. Try eating several small meals instead of two or three large ones. · Wait until 48 hours after all symptoms have gone away before you have spicy foods, alcohol, and drinks that contain caffeine. · Do not eat foods that are high in fat. · Avoid anti-inflammatory medicines such as aspirin, ibuprofen (Advil, Motrin), and naproxen (Aleve).  These can cause stomach upset. Talk to your doctor if you take daily aspirin for another health problem. When should you call for help? Call 911 anytime you think you may need emergency care. For example, call if:  ? · You passed out (lost consciousness). ? · You pass maroon or very bloody stools. ? · You vomit blood or what looks like coffee grounds. ? · You have new, severe belly pain. ?Call your doctor now or seek immediate medical care if:  ? · Your pain gets worse, especially if it becomes focused in one area of your belly. ? · You have a new or higher fever. ? · Your stools are black and look like tar, or they have streaks of blood. ? · You have unexpected vaginal bleeding. ? · You have symptoms of a urinary tract infection. These may include:  ¨ Pain when you urinate. ¨ Urinating more often than usual.  ¨ Blood in your urine. ? · You are dizzy or lightheaded, or you feel like you may faint. ? Watch closely for changes in your health, and be sure to contact your doctor if:  ? · You are not getting better after 1 day (24 hours). Where can you learn more? Go to http://mj-niels.info/. Enter D625 in the search box to learn more about \"Abdominal Pain: Care Instructions. \"  Current as of: March 20, 2017  Content Version: 11.4  © 4110-5720 [employer]. Care instructions adapted under license by [employer] (which disclaims liability or warranty for this information). If you have questions about a medical condition or this instruction, always ask your healthcare professional. [place] [address] any warranty or liability for your use of this information. [facility] Activation    Thank you for requesting access to [facility]. Please follow the instructions below to securely access and download your online medical record.  [facility] allows you to send messages to your doctor, view your test results, renew your prescriptions, schedule appointments, and more.    How Do I Sign Up? 1. In your internet browser, go to [email or web address] [email or web address]  2. Click on the First Time User? Click Here link in the Sign In box. You will be redirect to the New Member Sign Up page. 3. Enter your [facility] Access Code exactly as it appears below. You will not need to use this code after youve completed the sign-up process. If you do not sign up before the expiration date, you must request a new code. [facility] Access Code: [email or web address]  Expires: 2018 11:31 AM (This is the date your [facility] access code will )    4. Enter the last four digits of your Social Security Number (xxxx) and Date of Birth (mm/dd/yyyy) as indicated and click Submit. You will be taken to the next sign-up page. 5. Create a [facility] ID. This will be your [facility] login ID and cannot be changed, so think of one that is secure and easy to remember. 6. Create a [facility] password. You can change your password at any time. 7. Enter your Password Reset Question and Answer. This can be used at a later time if you forget your password. 8. Enter your e-mail address. You will receive e-mail notification when new information is available in [address] 9. Click Sign Up. You can now view and download portions of your medical record. 10. Click the Download Summary menu link to download a portable copy of your medical information. Additional Information    If you have questions, please visit the Frequently Asked Questions section of the [facility] website at [email or web address] Remember, [facility] is NOT to be used for urgent needs. For medical emergencies, dial 911.

## 2018-02-19 NOTE — ED NOTES
Pt discharged with written instructions and prescriptions at this time. Pt verbalizes understanding and all questions were answered. Discharged by , in stable condition.

## 2018-02-20 ENCOUNTER — APPOINTMENT (OUTPATIENT)
Dept: GENERAL RADIOLOGY | Age: 33
End: 2018-02-20
Attending: EMERGENCY MEDICINE
Payer: COMMERCIAL

## 2018-02-20 ENCOUNTER — HOSPITAL ENCOUNTER (EMERGENCY)
Age: 33
Discharge: HOME | End: 2018-02-20
Attending: EMERGENCY MEDICINE
Payer: COMMERCIAL

## 2018-02-20 VITALS
RESPIRATION RATE: 18 BRPM | BODY MASS INDEX: 25.88 KG/M2 | OXYGEN SATURATION: 96 % | TEMPERATURE: 97.9 F | SYSTOLIC BLOOD PRESSURE: 114 MMHG | DIASTOLIC BLOOD PRESSURE: 61 MMHG | HEIGHT: 67 IN | HEART RATE: 86 BPM | WEIGHT: 164.9 LBS

## 2018-02-20 DIAGNOSIS — Z76.5 DRUG-SEEKING BEHAVIOR: ICD-10-CM

## 2018-02-20 DIAGNOSIS — R07.89 ATYPICAL CHEST PAIN: ICD-10-CM

## 2018-02-20 DIAGNOSIS — R10.9 FLANK PAIN: Primary | ICD-10-CM

## 2018-02-20 LAB
ALBUMIN SERPL-MCNC: 4.4 G/DL (ref 3.5–5)
ALBUMIN/GLOB SERPL: 1.4 {RATIO} (ref 1.1–2.2)
ALP SERPL-CCNC: 66 U/L (ref 45–117)
ALT SERPL-CCNC: 34 U/L (ref 12–78)
ANION GAP SERPL CALC-SCNC: 7 MMOL/L (ref 5–15)
AST SERPL-CCNC: 19 U/L (ref 15–37)
BASOPHILS # BLD: 0 K/UL (ref 0–0.1)
BASOPHILS NFR BLD: 1 % (ref 0–1)
BILIRUB SERPL-MCNC: 0.9 MG/DL (ref 0.2–1)
BUN SERPL-MCNC: 21 MG/DL (ref 6–20)
BUN/CREAT SERPL: 16 (ref 12–20)
CALCIUM SERPL-MCNC: 8.8 MG/DL (ref 8.5–10.1)
CHLORIDE SERPL-SCNC: 105 MMOL/L (ref 97–108)
CO2 SERPL-SCNC: 27 MMOL/L (ref 21–32)
CREAT SERPL-MCNC: 1.32 MG/DL (ref 0.7–1.3)
DIFFERENTIAL METHOD BLD: ABNORMAL
EOSINOPHIL # BLD: 0.1 K/UL (ref 0–0.4)
EOSINOPHIL NFR BLD: 1 % (ref 0–7)
ERYTHROCYTE [DISTWIDTH] IN BLOOD BY AUTOMATED COUNT: 13.1 % (ref 11.5–14.5)
GLOBULIN SER CALC-MCNC: 3.2 G/DL (ref 2–4)
GLUCOSE SERPL-MCNC: 67 MG/DL (ref 65–100)
HCT VFR BLD AUTO: 33.6 % (ref 36.6–50.3)
HGB BLD-MCNC: 11.6 G/DL (ref 12.1–17)
IMM GRANULOCYTES # BLD: 0 K/UL (ref 0–0.04)
IMM GRANULOCYTES NFR BLD AUTO: 0 % (ref 0–0.5)
LYMPHOCYTES # BLD: 1.7 K/UL (ref 0.8–3.5)
LYMPHOCYTES NFR BLD: 31 % (ref 12–49)
MCH RBC QN AUTO: 30.4 PG (ref 26–34)
MCHC RBC AUTO-ENTMCNC: 34.5 G/DL (ref 30–36.5)
MCV RBC AUTO: 88.2 FL (ref 80–99)
MONOCYTES # BLD: 0.5 K/UL (ref 0–1)
MONOCYTES NFR BLD: 8 % (ref 5–13)
NEUTS SEG # BLD: 3.3 K/UL (ref 1.8–8)
NEUTS SEG NFR BLD: 59 % (ref 32–75)
NRBC # BLD: 0 K/UL (ref 0–0.01)
NRBC BLD-RTO: 0 PER 100 WBC
PLATELET # BLD AUTO: 163 K/UL (ref 150–400)
PMV BLD AUTO: 10.6 FL (ref 8.9–12.9)
POTASSIUM SERPL-SCNC: 3.4 MMOL/L (ref 3.5–5.1)
PROT SERPL-MCNC: 7.6 G/DL (ref 6.4–8.2)
RBC # BLD AUTO: 3.81 M/UL (ref 4.1–5.7)
SODIUM SERPL-SCNC: 139 MMOL/L (ref 136–145)
TROPONIN I SERPL-MCNC: <0.04 NG/ML
WBC # BLD AUTO: 5.5 K/UL (ref 4.1–11.1)

## 2018-02-20 PROCEDURE — 96361 HYDRATE IV INFUSION ADD-ON: CPT

## 2018-02-20 PROCEDURE — 85025 COMPLETE CBC W/AUTO DIFF WBC: CPT | Performed by: EMERGENCY MEDICINE

## 2018-02-20 PROCEDURE — 80053 COMPREHEN METABOLIC PANEL: CPT | Performed by: EMERGENCY MEDICINE

## 2018-02-20 PROCEDURE — 93005 ELECTROCARDIOGRAM TRACING: CPT

## 2018-02-20 PROCEDURE — 84484 ASSAY OF TROPONIN QUANT: CPT | Performed by: EMERGENCY MEDICINE

## 2018-02-20 PROCEDURE — 96374 THER/PROPH/DIAG INJ IV PUSH: CPT

## 2018-02-20 PROCEDURE — 71046 X-RAY EXAM CHEST 2 VIEWS: CPT

## 2018-02-20 PROCEDURE — 74011250636 HC RX REV CODE- 250/636: Performed by: EMERGENCY MEDICINE

## 2018-02-20 PROCEDURE — 36415 COLL VENOUS BLD VENIPUNCTURE: CPT | Performed by: EMERGENCY MEDICINE

## 2018-02-20 PROCEDURE — 99284 EMERGENCY DEPT VISIT MOD MDM: CPT

## 2018-02-20 RX ORDER — MORPHINE SULFATE 2 MG/ML
4 INJECTION, SOLUTION INTRAMUSCULAR; INTRAVENOUS
Status: COMPLETED | OUTPATIENT
Start: 2018-02-20 | End: 2018-02-20

## 2018-02-20 RX ORDER — LIDOCAINE 50 MG/G
PATCH TOPICAL
Qty: 15 EACH | Refills: 0 | Status: SHIPPED | OUTPATIENT
Start: 2018-02-20 | End: 2018-05-19

## 2018-02-20 RX ORDER — LIDOCAINE 50 MG/G
1 PATCH TOPICAL
Status: DISCONTINUED | OUTPATIENT
Start: 2018-02-20 | End: 2018-02-20 | Stop reason: HOSPADM

## 2018-02-20 RX ORDER — SODIUM CHLORIDE 9 MG/ML
1000 INJECTION, SOLUTION INTRAVENOUS ONCE
Status: COMPLETED | OUTPATIENT
Start: 2018-02-20 | End: 2018-02-20

## 2018-02-20 RX ADMIN — SODIUM CHLORIDE 1000 ML: 900 INJECTION, SOLUTION INTRAVENOUS at 17:56

## 2018-02-20 RX ADMIN — MORPHINE SULFATE 4 MG: 2 INJECTION, SOLUTION INTRAMUSCULAR; INTRAVENOUS at 17:53

## 2018-02-20 NOTE — DISCHARGE INSTRUCTIONS
Chronic Pain: Care Instructions  Your Care Instructions    Chronic pain is pain that lasts a long time (months or even years) and may or may not have a clear cause. It is different from acute pain, which usually does have a clear cause-like an injury or illness-and gets better over time. Chronic pain:  · Lasts over time but may vary from day to day. · Does not go away despite efforts to end it. · May disrupt your sleep and lead to fatigue. · May cause depression or anxiety. · May make your muscles tense, causing more pain. · Can disrupt your work, hobbies, home life, and relationships with friends and family. Chronic pain is a very real condition. It is not just in your head. Treatment can help and usually includes several methods used together, such as medicines, physical therapy, exercise, and other treatments. Learning how to relax and changing negative thought patterns can also help you cope. Chronic pain is complex. Taking an active role in your treatment will help you better manage your pain. Tell your doctor if you have trouble dealing with your pain. You may have to try several things before you find what works best for you. Follow-up care is a key part of your treatment and safety. Be sure to make and go to all appointments, and call your doctor if you are having problems. It's also a good idea to know your test results and keep a list of the medicines you take. How can you care for yourself at home? · Pace yourself. Break up large jobs into smaller tasks. Save harder tasks for days when you have less pain, or go back and forth between hard tasks and easier ones. Take rest breaks. · Relax, and reduce stress. Relaxation techniques such as deep breathing or meditation can help. · Keep moving. Gentle, daily exercise can help reduce pain over the long run. Try low- or no-impact exercises such as walking, swimming, and stationary biking. Do stretches to stay flexible.   · Try heat, cold packs, and massage. · Get enough sleep. Chronic pain can make you tired and drain your energy. Talk with your doctor if you have trouble sleeping because of pain. · Think positive. Your thoughts can affect your pain level. Do things that you enjoy to distract yourself when you have pain instead of focusing on the pain. See a movie, read a book, listen to music, or spend time with a friend. · If you think you are depressed, talk to your doctor about treatment. · Keep a daily pain diary. Record how your moods, thoughts, sleep patterns, activities, and medicine affect your pain. You may find that your pain is worse during or after certain activities or when you are feeling a certain emotion. Having a record of your pain can help you and your doctor find the best ways to treat your pain. · Take pain medicines exactly as directed. ¨ If the doctor gave you a prescription medicine for pain, take it as prescribed. ¨ If you are not taking a prescription pain medicine, ask your doctor if you can take an over-the-counter medicine. Reducing constipation caused by pain medicine  · Include fruits, vegetables, beans, and whole grains in your diet each day. These foods are high in fiber. · Drink plenty of fluids, enough so that your urine is light yellow or clear like water. If you have kidney, heart, or liver disease and have to limit fluids, talk with your doctor before you increase the amount of fluids you drink. · If your doctor recommends it, get more exercise. Walking is a good choice. Bit by bit, increase the amount you walk every day. Try for at least 30 minutes on most days of the week. · Schedule time each day for a bowel movement. A daily routine may help. Take your time and do not strain when having a bowel movement. When should you call for help? Call your doctor now or seek immediate medical care if:  ? · Your pain gets worse or is out of control.    ? · You feel down or blue, or you do not enjoy things like you once did. You may be depressed, which is common in people with chronic pain. Depression can be treated. ? · You have vomiting or cramps for more than 2 hours. ? Watch closely for changes in your health, and be sure to contact your doctor if:  ? · You cannot sleep because of pain. ? · You are very worried or anxious about your pain. ? · You have trouble taking your pain medicine. ? · You have any concerns about your pain medicine. ? · You have trouble with bowel movements, such as:  ¨ No bowel movement in 3 days. ¨ Blood in the anal area, in your stool, or on the toilet paper. ¨ Diarrhea for more than 24 hours. Where can you learn more? Go to http://mj-niels.info/. Enter N004 in the search box to learn more about \"Chronic Pain: Care Instructions. \"  Current as of: October 14, 2016  Content Version: 11.4  © 2692-4537 [employer]. Care instructions adapted under license by [employer] (which disclaims liability or warranty for this information). If you have questions about a medical condition or this instruction, always ask your healthcare professional. [place] [address] any warranty or liability for your use of this information.

## 2018-02-20 NOTE — ED NOTES
Pt presents with flank pain. Pt had normal workup yesterday at another ED. Hx of narcotic seeking behavior and malingering. Pt afebrile and non toxic appearing. Will hold off ordering any further labs and imaging studies for now.   Shan Johnson PA-C

## 2018-02-20 NOTE — DISCHARGE INSTRUCTIONS
Chest Pain: Care Instructions  Your Care Instructions    There are many things that can cause chest pain. Some are not serious and will get better on their own in a few days. But some kinds of chest pain need more testing and treatment. Your doctor may have recommended a follow-up visit in the next 8 to 12 hours. If you are not getting better, you may need more tests or treatment. Even though your doctor has released you, you still need to watch for any problems. The doctor carefully checked you, but sometimes problems can develop later. If you have new symptoms or if your symptoms do not get better, get medical care right away. If you have worse or different chest pain or pressure that lasts more than 5 minutes or you passed out (lost consciousness), call 911 or seek other emergency help right away. A medical visit is only one step in your treatment. Even if you feel better, you still need to do what your doctor recommends, such as going to all suggested follow-up appointments and taking medicines exactly as directed. This will help you recover and help prevent future problems. How can you care for yourself at home? · Rest until you feel better. · Take your medicine exactly as prescribed. Call your doctor if you think you are having a problem with your medicine. · Do not drive after taking a prescription pain medicine. When should you call for help? Call 911 if:  ? · You passed out (lost consciousness). ? · You have severe difficulty breathing. ? · You have symptoms of a heart attack. These may include:  ¨ Chest pain or pressure, or a strange feeling in your chest.  ¨ Sweating. ¨ Shortness of breath. ¨ Nausea or vomiting. ¨ Pain, pressure, or a strange feeling in your back, neck, jaw, or upper belly or in one or both shoulders or arms. ¨ Lightheadedness or sudden weakness. ¨ A fast or irregular heartbeat.   After you call 911, the  may tell you to chew 1 adult-strength or 2 to 4 low-dose aspirin. Wait for an ambulance. Do not try to drive yourself. ?Call your doctor today if:  ? · You have any trouble breathing. ? · Your chest pain gets worse. ? · You are dizzy or lightheaded, or you feel like you may faint. ? · You are not getting better as expected. ? · You are having new or different chest pain. Where can you learn more? Go to http://mj-niels.info/. Enter A120 in the search box to learn more about \"Chest Pain: Care Instructions. \"  Current as of: March 20, 2017  Content Version: 11.4  © 3548-5820 [employer]. Care instructions adapted under license by [employer] (which disclaims liability or warranty for this information). If you have questions about a medical condition or this instruction, always ask your healthcare professional. [place] [address] any warranty or liability for your use of this information. Flank Pain: Care Instructions  Your Care Instructions  Flank pain is pain on the side of the back just below the rib cage and above the waist. It can be on one or both sides. Flank pain has many possible causes, including a kidney stone, a urinary tract infection, or back strain. Flank pain may get better on its own. But don't ignore new symptoms, such as fever, nausea and vomiting, urination problems, pain that gets worse, and dizziness. These may be signs of a more serious problem. You may have to have tests or other treatment. Follow-up care is a key part of your treatment and safety. Be sure to make and go to all appointments, and call your doctor if you are having problems. It's also a good idea to know your test results and keep a list of the medicines you take. How can you care for yourself at home? · Rest until you feel better. · Take pain medicines exactly as directed. ¨ If the doctor gave you a prescription medicine for pain, take it as prescribed.   ¨ If you are not taking a prescription pain medicine, ask your doctor if you can take an over-the-counter pain medicine, such as acetaminophen (Tylenol), ibuprofen (Advil, Motrin), or naproxen (Aleve). Read and follow all instructions on the label. · If your doctor prescribed antibiotics, take them as directed. Do not stop taking them just because you feel better. You need to take the full course of antibiotics. · To apply heat, put a warm water bottle, a heating pad set on low, or a warm cloth on the painful area. Do not go to sleep with a heating pad on your skin. · To prevent dehydration, drink plenty of fluids, enough so that your urine is light yellow or clear like water. Choose water and other caffeine-free clear liquids until you feel better. If you have kidney, heart, or liver disease and have to limit fluids, talk with your doctor before you increase the amount of fluids you drink. When should you call for help? Call your doctor now or seek immediate medical care if:  ? · Your flank pain gets worse. ? · You have new symptoms, such as fever, nausea, or vomiting. ? · You have symptoms of a urinary problem. For example:  ¨ You have blood or pus in your urine. ¨ You have chills or body aches. ¨ It hurts to urinate. ¨ You have groin or belly pain. ? Watch closely for changes in your health, and be sure to contact your doctor if you do not get better as expected. Where can you learn more? Go to http://mj-niels.info/. Enter S191 in the search box to learn more about \"Flank Pain: Care Instructions. \"  Current as of: March 20, 2017  Content Version: 11.4  © 2471-8917 [employer]. Care instructions adapted under license by [employer] (which disclaims liability or warranty for this information).  If you have questions about a medical condition or this instruction, always ask your healthcare professional. [address]  any warranty or liability for your use of this information.

## 2018-02-20 NOTE — ED PROVIDER NOTES
HPI Comments: 28 y.o. male with past medical history significant for Pacemaker, Heart failure, Stroke who presents from home with chief complaint of left flank pain and low back pain. Pt reports an acute on chronic exacerbation of left flank pain and low back pain. He notes that during the visit with his urologist recently he was further advised to see nephrology to elevated renal function. Pt states that no pain medication was prescribed during that visit and that he is in pain. Pt notes the use of Percocet at home without relief. He was evaluated at [facility] yesterday for symptoms of which he reports \"they did nothing and I was still in pain after leaving\". Noted to have labs, CT and UA done yesterday. Pt notes that he has an appointment scheduled with pain management 3/21. There are no other acute medical concerns at this time. Chart review: Pt evaluated at [facility] yesterday for chest pain and flank pain. CT abd - punctate b/l nonobstructing renal calculi. No ureteral calculus. No urinary tract obstruction. BUN 9. Creatinine 1. 14. Troponin negative. Pt diagnosed with flank pain and drug seeking behavior, discharge dhome on Oxycodone and Promethazine. PCP: Ryan Gibbons NP    Note written by Fanny Sears, as dictated by MICHAEL Foss 8:03 PM    The history is provided by the patient. No  was used. 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 No pertinent family history.     Social History     Social History    Marital status:      Spouse name: N/A    Number of children: N/A    Years of education: N/A     Occupational History    Not on file.     Social History Main Topics    Smoking status: Never Smoker    Smokeless tobacco: Never Used      Comment: advised not to start    Alcohol use No    Drug use: No    Sexual activity: Yes     Partners: Female     Birth control/ protection: Condom     Other Topics Concern    Not on file     Social History Narrative         ALLERGIES: Betadine [povidone-iodine]    Review of Systems   Constitutional: Negative for fever. Respiratory: Negative for shortness of breath. Cardiovascular: Negative for chest pain. Gastrointestinal: Negative for abdominal pain and vomiting. Genitourinary: Positive for flank pain. Musculoskeletal: Positive for back pain. Neurological: Negative for headaches. Vitals:    02/19/18 1801   BP: 107/70   Pulse: 85   Resp: 16   Temp: 98 °F (36.7 °C)   SpO2: 100%   Weight: 72.6 kg (160 lb)   Height: 5' 7\" (1.702 m)            Physical Exam   Constitutional: He is oriented to person, place, and time. He appears well-developed and well-nourished. No distress. HENT:   Head: Normocephalic and atraumatic. Right Ear: External ear normal.   Left Ear: External ear normal.   Nose: Nose normal.   Mouth/Throat: Oropharynx is clear and moist.   Eyes: Conjunctivae and EOM are normal. Pupils are equal, round, and reactive to light. Right eye exhibits no discharge. Left eye exhibits no discharge. Neck: Normal range of motion. Neck supple. Cardiovascular: Normal rate, regular rhythm, normal heart sounds and intact distal pulses. Pulmonary/Chest: Effort normal and breath sounds normal.   Abdominal: Soft. Bowel sounds are normal. He exhibits no distension. There is no tenderness. There is no rebound and no guarding. Musculoskeletal: Normal range of motion. He exhibits no edema or tenderness. Neurological: He is alert and oriented to person, place, and time. No cranial nerve deficit. Coordination normal.   Skin: Skin is warm and dry. No rash noted.    Psychiatric: He has a normal mood and affect. His behavior is normal. Judgment and thought content normal.   Nursing note and vitals reviewed. Note written by Fanny Velasquez, as dictated by MICHAEL Blancas 8:04 PM    MDM  Number of Diagnoses or Management Options  Flank pain:   Other chronic pain:      Amount and/or Complexity of Data Reviewed  Clinical lab tests: reviewed  Tests in the radiology section of CPT®: reviewed  Decide to obtain previous medical records or to obtain history from someone other than the patient: yes  Review and summarize past medical records: yes  Discuss the patient with other providers: yes          ED Course       Procedures    PROGRESS NOTE:  7:46 PM  Discussed patient with Dr. Herrera Todd. Together we reviewed labs and work up from yesterday's visit with no acute findings. Pt with stable vital signs. Discharged to f/u with specialists. Dr. Herrera Todd agrees no further work up is necessary at this time and pt needs to follow up with his physicians. 7:54 PM  After discussing with patient that he will not be receiving narcotics, pt decided he would like to leave ER at this time. He was offered Tylenol but he declined. Advised to follow up with his physicians.

## 2018-02-20 NOTE — ED PROVIDER NOTES
EMERGENCY DEPARTMENT HISTORY AND PHYSICAL EXAM      Date: 2/20/2018  Patient Name: Lena Evans    History of Presenting Illness     Chief Complaint   Patient presents with    Flank Pain     Pt ambulatory to triage with c/o L flank pain x 1 week; pt was dx with stone here yesterday, states \"the percocet is not helping my pain\"; pt states continued hemtauria x 1 week       History Provided By: Patient    HPI: Lena Evans, 28 y.o. male with PMHx significant for transposition of great arteries, asthma, CAD, heart failure s/p pacemaker placement, CVA , presents ambulatory to the ED with cc of worsening L flank pain that radiates to his chest with associated BLE cramping, nausea and vomiting x 2 weeks. Pt reports that he was seen here 2 weeks ago for similar pain and was dx'd with elevated creatinine and 2 renal calculi. He was d/c home with percocet and instructions to f/u with PCP and pain management. Today, he feels as though one of the renal stones is passing due to the worsening L flank pain and associated sxs. He has had 7 kidney stones in the past. Pt endorses taking 1 of his 15 rx'd percocet without relief of his pain. He denies smoking or drinking, but he does report using a small amount of marijuana a few days ago for pain control. PCP: Sarika Guerin NP    There are no other complaints, changes, or physical findings at this time. Current Outpatient Prescriptions   Medication Sig Dispense Refill    lidocaine (LIDODERM) 5 % Apply patch to the affected area for 12 hours a day and remove for 12 hours a day. 15 Each 0    zolpidem (AMBIEN) 5 mg tablet Take 5 mg by mouth nightly as needed for Sleep.  oxyCODONE-acetaminophen (PERCOCET) 5-325 mg per tablet Take 1 Tab by mouth every six (6) hours as needed for Pain. Max Daily Amount: 4 Tabs. 10 Tab 0    clonazePAM (KLONOPIN) 0.5 mg tablet Take 1 Tab by mouth daily as needed.  (Patient taking differently: Take 0.5 mg by mouth two (2) times a day.) 30 Tab 1    busPIRone (BUSPAR) 7.5 mg tablet Take 1 Tab by mouth two (2) times a day. 60 Tab 1    metoprolol succinate (TOPROL-XL) 50 mg XL tablet Take 1 Tab by mouth daily. 90 Tab 0    ondansetron (ZOFRAN ODT) 4 mg disintegrating tablet Take 1 Tab by mouth every eight (8) hours as needed for Nausea. 10 Tab 0    spironolactone (ALDACTONE) 25 mg tablet Take 1 Tab by mouth daily. 30 Tab 1    lisinopril (PRINIVIL, ZESTRIL) 20 mg tablet Take 20 mg by mouth daily. Past History     Past Medical History:  Past Medical History:   Diagnosis Date    Asthma     CAD (coronary artery disease)     Gout     Heart failure ([address]     with pacemaker, states transition of great vessels    Pacemaker     Stroke [facility])     Transposition great arteries        Past Surgical History:  Past Surgical History:   Procedure Laterality Date    HX PACEMAKER         Family History:  History reviewed. No pertinent family history. Social History:  Social History   Substance Use Topics    Smoking status: Never Smoker    Smokeless tobacco: Never Used      Comment: advised not to start    Alcohol use No       Allergies: Allergies   Allergen Reactions    Betadine [Povidone-Iodine] Rash         Review of Systems   Review of Systems   Constitutional: Negative for chills and fever. HENT: Negative for congestion. Eyes: Negative for visual disturbance. Respiratory: Negative for chest tightness. Cardiovascular: Negative for chest pain and leg swelling. Gastrointestinal: Positive for nausea and vomiting. Negative for abdominal pain. Endocrine: Negative for polyuria. Genitourinary: Positive for flank pain (L sided, radiating to his chest). Negative for dysuria and frequency. Musculoskeletal: Positive for myalgias (BLE cramping). Skin: Negative for color change. Allergic/Immunologic: Negative for immunocompromised state. Neurological: Negative for numbness.        Physical Exam   Physical Exam   Nursing note and vitals reviewed. General appearance: non-toxic  Eyes: PERRL, EOMI, conjunctiva normal, anicteric sclera  HEENT: mucous membranes tacky, oropharynx is clear  Pulmonary: clear to auscultation bilaterally  Cardiac: normal rate and regular rhythm, no murmurs, gallops, or rubs, 2+DP pulses, 2+ radial pulses  Abdomen: soft, nontender, nondistended, bowel sounds present, vague L flank pain  MSK: no pre-tibial edema, ttp anterior chest wall  Neuro: Alert, answers questions appropriately  Skin: capillary refill about 2-3 seconds, well healed midline sternotomy scar, no rash or vesicles       Diagnostic Study Results     Labs -     Recent Results (from the past 12 hour(s))   EKG, 12 LEAD, INITIAL    Collection Time: 02/20/18  5:39 PM   Result Value Ref Range    Ventricular Rate 86 BPM    Atrial Rate 86 BPM    P-R Interval 230 ms    QRS Duration 100 ms    Q-T Interval 410 ms    QTC Calculation (Bezet) 490 ms    Calculated R Axis 136 degrees    Calculated T Axis 1 degrees    Diagnosis       Atrial-paced rhythm with prolonged AV conduction  Incomplete right bundle branch block , plus right ventricular hypertrophy  ST & Marked T wave abnormality, consider anterolateral ischemia  Prolonged QT  Abnormal ECG  When compared with ECG of 18-FEB-2018 19:02,  No significant change was found     CBC WITH AUTOMATED DIFF    Collection Time: 02/20/18  5:50 PM   Result Value Ref Range    WBC 5.5 4.1 - 11.1 K/uL    RBC 3.81 (L) 4.10 - 5.70 M/uL    HGB 11.6 (L) 12.1 - 17.0 g/dL    HCT 33.6 (L) 36.6 - 50.3 %    MCV 88.2 80.0 - 99.0 FL    MCH 30.4 26.0 - 34.0 PG    MCHC 34.5 30.0 - 36.5 g/dL    RDW 13.1 11.5 - 14.5 %    PLATELET 154 262 - 022 K/uL    MPV 10.6 8.9 - 12.9 FL    NRBC 0.0 0  WBC    ABSOLUTE NRBC 0.00 0.00 - 0.01 K/uL    NEUTROPHILS 59 32 - 75 %    LYMPHOCYTES 31 12 - 49 %    MONOCYTES 8 5 - 13 %    EOSINOPHILS 1 0 - 7 %    BASOPHILS 1 0 - 1 %    IMMATURE GRANULOCYTES 0 0.0 - 0.5 %    ABS.  NEUTROPHILS 3.3 1.8 - 8.0 K/UL ABS. LYMPHOCYTES 1.7 0.8 - 3.5 K/UL    ABS. MONOCYTES 0.5 0.0 - 1.0 K/UL    ABS. EOSINOPHILS 0.1 0.0 - 0.4 K/UL    ABS. BASOPHILS 0.0 0.0 - 0.1 K/UL    ABS. IMM. GRANS. 0.0 0.00 - 0.04 K/UL    DF AUTOMATED     METABOLIC PANEL, COMPREHENSIVE    Collection Time: 02/20/18  5:50 PM   Result Value Ref Range    Sodium 139 136 - 145 mmol/L    Potassium 3.4 (L) 3.5 - 5.1 mmol/L    Chloride 105 97 - 108 mmol/L    CO2 27 21 - 32 mmol/L    Anion gap 7 5 - 15 mmol/L    Glucose 67 65 - 100 mg/dL    BUN 21 (H) 6 - 20 MG/DL    Creatinine 1.32 (H) 0.70 - 1.30 MG/DL    BUN/Creatinine ratio 16 12 - 20      GFR est AA >60 >60 ml/min/1.73m2    GFR est non-AA >60 >60 ml/min/1.73m2    Calcium 8.8 8.5 - 10.1 MG/DL    Bilirubin, total 0.9 0.2 - 1.0 MG/DL    ALT (SGPT) 34 12 - 78 U/L    AST (SGOT) 19 15 - 37 U/L    Alk. phosphatase 66 45 - 117 U/L    Protein, total 7.6 6.4 - 8.2 g/dL    Albumin 4.4 3.5 - 5.0 g/dL    Globulin 3.2 2.0 - 4.0 g/dL    A-G Ratio 1.4 1.1 - 2.2     TROPONIN I    Collection Time: 02/20/18  5:50 PM   Result Value Ref Range    Troponin-I, Qt. <0.04 <0.05 ng/mL       Radiologic Studies -   CXR Results  (Last 48 hours)               02/20/18 1808  XR CHEST PA LAT Final result    Impression:  IMPRESSION:        No acute process. Narrative:  EXAM:  XR CHEST PA LAT       INDICATION:  chest pain       COMPARISON:  11/27/2017        FINDINGS:       PA and lateral radiographs of the chest demonstrate clear lungs. The cardiac   and mediastinal contours and pulmonary vascularity are normal.   The pacer   device is intact and the lead is unchanged in position. Medical Decision Making   I am the first provider for this patient. I reviewed the vital signs, available nursing notes, past medical history, past surgical history, family history and social history. Vital Signs-Reviewed the patient's vital signs.   Patient Vitals for the past 12 hrs:   Temp Pulse Resp BP SpO2   02/20/18 1607 97.9 °F (36.6 °C) 86 18 114/61 96 %       Pulse Oximetry Analysis - 96% on RA    Cardiac Monitor:   Rate: 86 bpm  Rhythm: Normal Sinus Rhythm      EKG interpretation: (Preliminary) 1739  Rhythm: atrial paced rhythm with prolonged AV conduction, incomplete RBBB; and regular . Rate (approx.): 86 bpm; Axis: R ventricular hypertrophy; P wave: normal; QRS interval: normal ; ST/T wave: no ST elevation, no ST depression; unchanged from 2/18/2018, precordial ST/T wave abnoramality  SD interval 230 ms,  ms,  ms, QTc 490 ms. Written by Vanesa Escobedo, as dictated by Indu Lucas MD.    Records Reviewed: Nursing Notes and Old Medical Records    Provider Notes (Medical Decision Making):   DDx: drug seeking behavior, lumbar strain, chest wall pain. Low suspicion for acute intraabd or  pathology. Recent work ups reviewed, including CT abd/pelvis without acute pathology. Not further imaging indicating at this point. Pt requesting pain medication and hospital admission. Based on exam, recent labs/imaging, do not feel pt requires any further evaluation. I advised him of concern for side effects and addictive nature of pain medication, and need to f/u with same provider for additional or different pain medication. ED Course:   Initial assessment performed. The patients presenting problems have been discussed, and they are in agreement with the care plan formulated and outlined with them. I have encouraged them to ask questions as they arise throughout their visit. 3:45 PM  Per records review, pt has displayed drug seeking behavior, went to [facility] yesterday. He was told that he would not be receiving narcotics, so he left the ED after being offered Tylenol. Written by Vanesa Escobedo as dictated by Surya Muir MD    Disposition:  DISCHARGE NOTE:  7:02 PM  Pt has been reexamined. Pt has no new complaints, changes, or physical findings.  Care plan outlined and precautions discussed. All available results reviewed with pt. All medications reviewed with pt. All of pts questions and concerns addressed. Pt agrees to f/u as instructed and agrees to return to ED upon further deterioration. Pt is ready to go home. Written by Treasure Martin [facility] [place] as dictated by Lalo Hernandez MD    PLAN:  1. Discharge Medication List as of 2/20/2018  6:48 PM      START taking these medications    Details   lidocaine (LIDODERM) 5 % Apply patch to the affected area for 12 hours a day and remove for 12 hours a day., Normal, Disp-15 Each, R-0         CONTINUE these medications which have NOT CHANGED    Details   zolpidem (AMBIEN) 5 mg tablet Take 5 mg by mouth nightly as needed for Sleep., Historical Med      oxyCODONE-acetaminophen (PERCOCET) 5-325 mg per tablet Take 1 Tab by mouth every six (6) hours as needed for Pain. Max Daily Amount: 4 Tabs., Print, Disp-10 Tab, R-0      clonazePAM (KLONOPIN) 0.5 mg tablet Take 1 Tab by mouth daily as needed. , Print, Disp-30 Tab, R-1      busPIRone (BUSPAR) 7.5 mg tablet Take 1 Tab by mouth two (2) times a day., Normal, Disp-60 Tab, R-1      metoprolol succinate (TOPROL-XL) 50 mg XL tablet Take 1 Tab by mouth daily. , Normal, Disp-90 Tab, R-0      ondansetron (ZOFRAN ODT) 4 mg disintegrating tablet Take 1 Tab by mouth every eight (8) hours as needed for Nausea., Normal, Disp-10 Tab, R-0      spironolactone (ALDACTONE) 25 mg tablet Take 1 Tab by mouth daily. , Normal, Disp-30 Tab, R-1      lisinopril (PRINIVIL, ZESTRIL) 20 mg tablet Take 20 mg by mouth daily. , Historical Med           2.    Follow-up Information     Follow up With Details Comments [address] [place] [address] MD Jamarcus Schedule an appointment as soon as possible for a visit in 2 days FOR FOLLOW UP WITH A NEPHROLOGIST [address]unce[address]  [facility] [address]  [phone]      [facility] EMERGENCY DEPT Go in 1 day If symptoms worsen [address] [address] Stanley Longo Claribel Al NP Schedule an appointment as soon as possible for a visit in 2 days FOR FOLLOW UP AND PAIN MANAGEMENT NEEDS [address] [facility]  [address]  [phone]          Return to ED if worse     Diagnosis     Clinical Impression:   1. Flank pain    2. Atypical chest pain    3. Drug-seeking behavior        Attestations: This note is prepared by Nigel Henriquez acting as scribe for MD Analia Weiss MD : The scribe's documentation has been prepared under my direction and personally reviewed by me in its entirety. I confirm that the note above accurately reflects all work, treatment, procedures, and medical decision making performed by me.

## 2018-02-20 NOTE — ED NOTES
Pt arrived via wheelchair to room #H7 from triage. Pt with c/o of left flank pain. Patient stated that he was in the hospital last week and was told that he has a kidney stone. Patient was sent home on percocet. Patient returns today, saying that he is in more pain and the medication is not working. Patient stated that the intense pain is causing chest pressure that is radiating to right arm causing cramping. Pt resting in position of comfort. Call bell within reach.

## 2018-02-21 LAB
ATRIAL RATE: 86 BPM
CALCULATED R AXIS, ECG10: 136 DEGREES
CALCULATED T AXIS, ECG11: 1 DEGREES
DIAGNOSIS, 93000: NORMAL
P-R INTERVAL, ECG05: 230 MS
Q-T INTERVAL, ECG07: 410 MS
QRS DURATION, ECG06: 100 MS
QTC CALCULATION (BEZET), ECG08: 490 MS
VENTRICULAR RATE, ECG03: 86 BPM

## 2018-02-21 NOTE — ED NOTES
Discharge instructions given to patient by [employer]. Garland Mckeon MD. Patient verbalized understanding of discharge instructions. Pt discharged without difficulty. Pt discharged in stable condition via ambulation.

## 2018-02-22 ENCOUNTER — TELEPHONE (OUTPATIENT)
Dept: BEHAVIORAL/MENTAL HEALTH CLINIC | Age: 33
End: 2018-02-22

## 2018-02-22 NOTE — TELEPHONE ENCOUNTER
Pt calls to say he is going through some things right now and he needs his Clonazepam refilled early.    I called pharmacy and pt picked up #[phone] on 2/16/18    Please call him and let him know-

## 2018-02-23 NOTE — TELEPHONE ENCOUNTER
obtained, LRD 2/16/2018. Provider consulted, no early refill will be authorized. Patient informed. Said he's just having \"a real hard time with everything. \" Explained because he just got it a week ago, he's taking it 3-4 more times than prescribed and we will not authorize a refill that early.

## 2018-02-27 RX ORDER — HYDROXYZINE PAMOATE 50 MG/1
CAPSULE ORAL
Qty: 90 CAP | Refills: 2 | OUTPATIENT
Start: 2018-02-27

## 2018-03-30 ENCOUNTER — HOSPITAL ENCOUNTER (EMERGENCY)
Age: 33
Discharge: HOME | End: 2018-03-30
Attending: EMERGENCY MEDICINE
Payer: COMMERCIAL

## 2018-03-30 ENCOUNTER — APPOINTMENT (OUTPATIENT)
Dept: CT IMAGING | Age: 33
End: 2018-03-30
Attending: EMERGENCY MEDICINE
Payer: COMMERCIAL

## 2018-03-30 VITALS
HEART RATE: 85 BPM | DIASTOLIC BLOOD PRESSURE: 73 MMHG | SYSTOLIC BLOOD PRESSURE: 102 MMHG | TEMPERATURE: 97.9 F | BODY MASS INDEX: 26.23 KG/M2 | HEIGHT: 67 IN | OXYGEN SATURATION: 96 % | WEIGHT: 167.11 LBS | RESPIRATION RATE: 11 BRPM

## 2018-03-30 DIAGNOSIS — F12.90 MARIJUANA USE: ICD-10-CM

## 2018-03-30 DIAGNOSIS — R10.9 FLANK PAIN: Primary | ICD-10-CM

## 2018-03-30 DIAGNOSIS — R07.89 ATYPICAL CHEST PAIN: ICD-10-CM

## 2018-03-30 DIAGNOSIS — R31.9 HEMATURIA, UNSPECIFIED TYPE: ICD-10-CM

## 2018-03-30 DIAGNOSIS — G89.29 OTHER CHRONIC PAIN: ICD-10-CM

## 2018-03-30 DIAGNOSIS — N20.0 KIDNEY STONE: ICD-10-CM

## 2018-03-30 LAB
ALBUMIN SERPL-MCNC: 5.1 G/DL (ref 3.5–5)
ALBUMIN/GLOB SERPL: 1.4 {RATIO} (ref 1.1–2.2)
ALP SERPL-CCNC: 84 U/L (ref 45–117)
ALT SERPL-CCNC: 31 U/L (ref 12–78)
AMPHET UR QL SCN: POSITIVE
ANION GAP SERPL CALC-SCNC: 6 MMOL/L (ref 5–15)
APPEARANCE UR: CLEAR
AST SERPL-CCNC: 26 U/L (ref 15–37)
ATRIAL RATE: 86 BPM
BACTERIA URNS QL MICRO: NEGATIVE /HPF
BARBITURATES UR QL SCN: NEGATIVE
BASOPHILS # BLD: 0 K/UL (ref 0–0.1)
BASOPHILS NFR BLD: 1 % (ref 0–1)
BENZODIAZ UR QL: NEGATIVE
BILIRUB SERPL-MCNC: 0.6 MG/DL (ref 0.2–1)
BILIRUB UR QL: NEGATIVE
BUN SERPL-MCNC: 20 MG/DL (ref 6–20)
BUN/CREAT SERPL: 14 (ref 12–20)
CALCIUM SERPL-MCNC: 9.2 MG/DL (ref 8.5–10.1)
CALCULATED R AXIS, ECG10: 157 DEGREES
CALCULATED T AXIS, ECG11: 4 DEGREES
CANNABINOIDS UR QL SCN: POSITIVE
CHLORIDE SERPL-SCNC: 103 MMOL/L (ref 97–108)
CO2 SERPL-SCNC: 27 MMOL/L (ref 21–32)
COCAINE UR QL SCN: NEGATIVE
COLOR UR: ABNORMAL
CREAT SERPL-MCNC: 1.38 MG/DL (ref 0.7–1.3)
DIAGNOSIS, 93000: NORMAL
DIFFERENTIAL METHOD BLD: NORMAL
DRUG SCRN COMMENT,DRGCM: ABNORMAL
EOSINOPHIL # BLD: 0.2 K/UL (ref 0–0.4)
EOSINOPHIL NFR BLD: 3 % (ref 0–7)
EPITH CASTS URNS QL MICRO: ABNORMAL /LPF
ERYTHROCYTE [DISTWIDTH] IN BLOOD BY AUTOMATED COUNT: 13.6 % (ref 11.5–14.5)
GLOBULIN SER CALC-MCNC: 3.6 G/DL (ref 2–4)
GLUCOSE SERPL-MCNC: 98 MG/DL (ref 65–100)
GLUCOSE UR STRIP.AUTO-MCNC: NEGATIVE MG/DL
HCT VFR BLD AUTO: 41.8 % (ref 36.6–50.3)
HGB BLD-MCNC: 14.2 G/DL (ref 12.1–17)
HGB UR QL STRIP: ABNORMAL
HYALINE CASTS URNS QL MICRO: ABNORMAL /LPF (ref 0–5)
IMM GRANULOCYTES # BLD: 0 K/UL (ref 0–0.04)
IMM GRANULOCYTES NFR BLD AUTO: 0 % (ref 0–0.5)
KETONES UR QL STRIP.AUTO: NEGATIVE MG/DL
LEUKOCYTE ESTERASE UR QL STRIP.AUTO: NEGATIVE
LYMPHOCYTES # BLD: 1.8 K/UL (ref 0.8–3.5)
LYMPHOCYTES NFR BLD: 34 % (ref 12–49)
MCH RBC QN AUTO: 30.9 PG (ref 26–34)
MCHC RBC AUTO-ENTMCNC: 34 G/DL (ref 30–36.5)
MCV RBC AUTO: 91.1 FL (ref 80–99)
METHADONE UR QL: NEGATIVE
MONOCYTES # BLD: 0.5 K/UL (ref 0–1)
MONOCYTES NFR BLD: 8 % (ref 5–13)
NEUTS SEG # BLD: 2.9 K/UL (ref 1.8–8)
NEUTS SEG NFR BLD: 54 % (ref 32–75)
NITRITE UR QL STRIP.AUTO: NEGATIVE
NRBC # BLD: 0 K/UL (ref 0–0.01)
NRBC BLD-RTO: 0 PER 100 WBC
OPIATES UR QL: NEGATIVE
P-R INTERVAL, ECG05: 196 MS
PCP UR QL: NEGATIVE
PH UR STRIP: 6 [PH] (ref 5–8)
PLATELET # BLD AUTO: 198 K/UL (ref 150–400)
PMV BLD AUTO: 11.3 FL (ref 8.9–12.9)
POTASSIUM SERPL-SCNC: 3.6 MMOL/L (ref 3.5–5.1)
PROT SERPL-MCNC: 8.7 G/DL (ref 6.4–8.2)
PROT UR STRIP-MCNC: NEGATIVE MG/DL
Q-T INTERVAL, ECG07: 398 MS
QRS DURATION, ECG06: 104 MS
QTC CALCULATION (BEZET), ECG08: 476 MS
RBC # BLD AUTO: 4.59 M/UL (ref 4.1–5.7)
RBC #/AREA URNS HPF: ABNORMAL /HPF (ref 0–5)
SODIUM SERPL-SCNC: 136 MMOL/L (ref 136–145)
SP GR UR REFRACTOMETRY: 1.01 (ref 1–1.03)
TROPONIN I SERPL-MCNC: <0.04 NG/ML
UA: UC IF INDICATED,UAUC: ABNORMAL
UROBILINOGEN UR QL STRIP.AUTO: 0.2 EU/DL (ref 0.2–1)
VENTRICULAR RATE, ECG03: 86 BPM
WBC # BLD AUTO: 5.4 K/UL (ref 4.1–11.1)
WBC URNS QL MICRO: ABNORMAL /HPF (ref 0–4)

## 2018-03-30 PROCEDURE — 85025 COMPLETE CBC W/AUTO DIFF WBC: CPT | Performed by: EMERGENCY MEDICINE

## 2018-03-30 PROCEDURE — 96375 TX/PRO/DX INJ NEW DRUG ADDON: CPT

## 2018-03-30 PROCEDURE — 80307 DRUG TEST PRSMV CHEM ANLYZR: CPT | Performed by: EMERGENCY MEDICINE

## 2018-03-30 PROCEDURE — 96374 THER/PROPH/DIAG INJ IV PUSH: CPT

## 2018-03-30 PROCEDURE — 80053 COMPREHEN METABOLIC PANEL: CPT | Performed by: EMERGENCY MEDICINE

## 2018-03-30 PROCEDURE — 74011250636 HC RX REV CODE- 250/636: Performed by: EMERGENCY MEDICINE

## 2018-03-30 PROCEDURE — 93005 ELECTROCARDIOGRAM TRACING: CPT

## 2018-03-30 PROCEDURE — 81001 URINALYSIS AUTO W/SCOPE: CPT | Performed by: EMERGENCY MEDICINE

## 2018-03-30 PROCEDURE — 74176 CT ABD & PELVIS W/O CONTRAST: CPT

## 2018-03-30 PROCEDURE — 36415 COLL VENOUS BLD VENIPUNCTURE: CPT | Performed by: EMERGENCY MEDICINE

## 2018-03-30 PROCEDURE — 84484 ASSAY OF TROPONIN QUANT: CPT | Performed by: EMERGENCY MEDICINE

## 2018-03-30 PROCEDURE — 99285 EMERGENCY DEPT VISIT HI MDM: CPT

## 2018-03-30 RX ORDER — ONDANSETRON 2 MG/ML
INJECTION INTRAMUSCULAR; INTRAVENOUS
Status: DISCONTINUED
Start: 2018-03-30 | End: 2018-03-30 | Stop reason: HOSPADM

## 2018-03-30 RX ORDER — ONDANSETRON 2 MG/ML
4 INJECTION INTRAMUSCULAR; INTRAVENOUS
Status: COMPLETED | OUTPATIENT
Start: 2018-03-30 | End: 2018-03-30

## 2018-03-30 RX ORDER — KETOROLAC TROMETHAMINE 30 MG/ML
INJECTION, SOLUTION INTRAMUSCULAR; INTRAVENOUS
Status: DISCONTINUED
Start: 2018-03-30 | End: 2018-03-30 | Stop reason: HOSPADM

## 2018-03-30 RX ORDER — KETOROLAC TROMETHAMINE 30 MG/ML
30 INJECTION, SOLUTION INTRAMUSCULAR; INTRAVENOUS
Status: COMPLETED | OUTPATIENT
Start: 2018-03-30 | End: 2018-03-30

## 2018-03-30 RX ORDER — MORPHINE SULFATE 10 MG/ML
4 INJECTION, SOLUTION INTRAMUSCULAR; INTRAVENOUS
Status: COMPLETED | OUTPATIENT
Start: 2018-03-30 | End: 2018-03-30

## 2018-03-30 RX ADMIN — MORPHINE SULFATE 2 MG: 10 INJECTION INTRAVENOUS at 03:40

## 2018-03-30 RX ADMIN — KETOROLAC TROMETHAMINE 30 MG: 30 INJECTION, SOLUTION INTRAMUSCULAR at 01:40

## 2018-03-30 RX ADMIN — ONDANSETRON 4 MG: 2 INJECTION INTRAMUSCULAR; INTRAVENOUS at 01:40

## 2018-03-30 NOTE — ED PROVIDER NOTES
EMERGENCY DEPARTMENT HISTORY AND PHYSICAL EXAM      Date: 3/30/2018  Patient Name: Kings Nolan    History of Presenting Illness     Chief Complaint   Patient presents with    Chest Pain    Flank Pain       History Provided By: Patient    HPI: Kings Nolan, 28 y.o. male with PMHx significant for gout / CAD / heart failure TGA/ stroke / pacemaker, presents ambulatory to the ED with cc L flank pain that woke him from his sleep earlier tonight. He also had a fleeting episode of sharp chest pain that came and went. Pt complains of associated L side abdominal pain, hematuria, and difficulty urinating. He states that his pain is exacerbated with movement. Pt reports that he was admitted to [facility] ~2.5 weeks ago with an elevated creatinine. He reports a history of kidney stones and notes that his last stone was 2.5 years ago. Per records, pt has previously had a CT of the abdomen/pelvis on 2/18/2018 that showed punctate bilateral non-obstructing renal calculi, no uretal calculus, and no urinary tract obstruction. Pt specifically denies nausea, vomiting, fever, chills, or SOB. PCP: Shahnaz Caceres NP    There are no other complaints, changes, or physical findings at this time. Current Facility-Administered Medications   Medication Dose Route Frequency Provider Last Rate Last Dose    ondansetron (ZOFRAN) 4 mg/2 mL injection             ketorolac (TORADOL) 30 mg/mL (1 mL) injection              Current Outpatient Prescriptions   Medication Sig Dispense Refill    lidocaine (LIDODERM) 5 % Apply patch to the affected area for 12 hours a day and remove for 12 hours a day. 15 Each 0    zolpidem (AMBIEN) 5 mg tablet Take 5 mg by mouth nightly as needed for Sleep.  oxyCODONE-acetaminophen (PERCOCET) 5-325 mg per tablet Take 1 Tab by mouth every six (6) hours as needed for Pain. Max Daily Amount: 4 Tabs. 10 Tab 0    clonazePAM (KLONOPIN) 0.5 mg tablet Take 1 Tab by mouth daily as needed. (Patient taking differently: Take 0.5 mg by mouth two (2) times a day.) 30 Tab 1    busPIRone (BUSPAR) 7.5 mg tablet Take 1 Tab by mouth two (2) times a day. 60 Tab 1    metoprolol succinate (TOPROL-XL) 50 mg XL tablet Take 1 Tab by mouth daily. 90 Tab 0    ondansetron (ZOFRAN ODT) 4 mg disintegrating tablet Take 1 Tab by mouth every eight (8) hours as needed for Nausea. 10 Tab 0    spironolactone (ALDACTONE) 25 mg tablet Take 1 Tab by mouth daily. 30 Tab 1    lisinopril (PRINIVIL, ZESTRIL) 20 mg tablet Take 20 mg by mouth daily. 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 Social History:  Social History   Substance Use Topics    Smoking status: Never Smoker    Smokeless tobacco: Never Used      Comment: advised not to start    Alcohol use No       Allergies: Allergies   Allergen Reactions    Betadine [Povidone-Iodine] Rash    Contrast Agent [Iodine] Itching     Need to pre-medicate with benadryl         Review of Systems   Review of Systems   Constitutional: Negative for chills and fever. HENT: Negative. Negative for congestion, rhinorrhea, sneezing and sore throat. Eyes: Negative. Negative for redness and visual disturbance. Respiratory: Negative. Negative for cough, shortness of breath and wheezing. Cardiovascular: Positive for chest pain. Negative for leg swelling. Gastrointestinal: Positive for abdominal pain (L side). Negative for diarrhea, nausea and vomiting. Genitourinary: Positive for difficulty urinating, flank pain (L) and hematuria. Negative for discharge and frequency. Musculoskeletal: Negative for arthralgias, back pain, myalgias and neck stiffness. Skin: Negative. Negative for color change and rash. Neurological: Negative. Negative for dizziness, syncope, weakness, numbness and headaches. Hematological: Negative for adenopathy. Psychiatric/Behavioral: Negative. All other systems reviewed and are negative. Physical Exam   Physical Exam   Constitutional: He is oriented to person, place, and time. HENT:   Head: Atraumatic. Eyes: EOM are normal.   Cardiovascular: Normal rate, regular rhythm, normal heart sounds and intact distal pulses. Exam reveals no gallop and no friction rub. No murmur heard. Pulmonary/Chest: Effort normal and breath sounds normal. No respiratory distress. He has no wheezes. He has no rales. He exhibits no tenderness. Abdominal: Soft. Bowel sounds are normal. He exhibits no distension and no mass. There is no rebound and no guarding. Mild L CVA tenderness  L flank tenderness   Musculoskeletal: Normal range of motion. He exhibits no edema or tenderness. Neurological: He is alert and oriented to person, place, and time. Psychiatric: He has a normal mood and affect. Nursing note and vitals reviewed.         Diagnostic Study Results     Labs -     Recent Results (from the past 12 hour(s))   EKG, 12 LEAD, INITIAL    Collection Time: 03/30/18 12:18 AM   Result Value Ref Range    Ventricular Rate 86 BPM    Atrial Rate 86 BPM    P-R Interval 196 ms    QRS Duration 104 ms    Q-T Interval 398 ms    QTC Calculation (Bezet) 476 ms    Calculated R Axis 157 degrees    Calculated T Axis 4 degrees    Diagnosis       Atrial-paced rhythm  Incomplete right bundle branch block , plus right ventricular hypertrophy  Cannot rule out Inferior infarct , age undetermined  When compared with ECG of 20-FEB-2018 17:39,  Minimal criteria for Inferior infarct are now present     CBC WITH AUTOMATED DIFF    Collection Time: 03/30/18 12:26 AM   Result Value Ref Range    WBC 5.4 4.1 - 11.1 K/uL    RBC 4.59 4. 10 - 5.70 M/uL    HGB 14.2 12.1 - 17.0 g/dL    HCT 41.8 36.6 - 50.3 %    MCV 91.1 80.0 - 99.0 FL    MCH 30.9 26.0 - 34.0 PG    MCHC 34.0 30.0 - 36.5 g/dL    RDW 13.6 11.5 - 14.5 %    PLATELET 933 850 - 213 K/uL    MPV 11.3 8.9 - 12.9 FL    NRBC 0.0 0  WBC    ABSOLUTE NRBC 0.00 0.00 - 0.01 K/uL    NEUTROPHILS 54 32 - 75 %    LYMPHOCYTES 34 12 - 49 %    MONOCYTES 8 5 - 13 %    EOSINOPHILS 3 0 - 7 %    BASOPHILS 1 0 - 1 %    IMMATURE GRANULOCYTES 0 0.0 - 0.5 %    ABS. NEUTROPHILS 2.9 1.8 - 8.0 K/UL    ABS. LYMPHOCYTES 1.8 0.8 - 3.5 K/UL    ABS. MONOCYTES 0.5 0.0 - 1.0 K/UL    ABS. EOSINOPHILS 0.2 0.0 - 0.4 K/UL    ABS. BASOPHILS 0.0 0.0 - 0.1 K/UL    ABS. IMM. GRANS. 0.0 0.00 - 0.04 K/UL    DF AUTOMATED     METABOLIC PANEL, COMPREHENSIVE    Collection Time: 03/30/18 12:26 AM   Result Value Ref Range    Sodium 136 136 - 145 mmol/L    Potassium 3.6 3.5 - 5.1 mmol/L    Chloride 103 97 - 108 mmol/L    CO2 27 21 - 32 mmol/L    Anion gap 6 5 - 15 mmol/L    Glucose 98 65 - 100 mg/dL    BUN 20 6 - 20 MG/DL    Creatinine 1.38 (H) 0.70 - 1.30 MG/DL    BUN/Creatinine ratio 14 12 - 20      GFR est AA >60 >60 ml/min/1.73m2    GFR est non-AA 60 (L) >60 ml/min/1.73m2    Calcium 9.2 8.5 - 10.1 MG/DL    Bilirubin, total 0.6 0.2 - 1.0 MG/DL    ALT (SGPT) 31 12 - 78 U/L    AST (SGOT) 26 15 - 37 U/L    Alk.  phosphatase 84 45 - 117 U/L    Protein, total 8.7 (H) 6.4 - 8.2 g/dL    Albumin 5.1 (H) 3.5 - 5.0 g/dL    Globulin 3.6 2.0 - 4.0 g/dL    A-G Ratio 1.4 1.1 - 2.2     TROPONIN I    Collection Time: 03/30/18 12:26 AM   Result Value Ref Range    Troponin-I, Qt. <0.04 <0.05 ng/mL   URINALYSIS W/ REFLEX CULTURE    Collection Time: 03/30/18  1:30 AM   Result Value Ref Range    Color YELLOW/STRAW      Appearance CLEAR CLEAR      Specific gravity 1.008 1.003 - 1.030      pH (UA) 6.0 5.0 - 8.0      Protein NEGATIVE  NEG mg/dL    Glucose NEGATIVE  NEG mg/dL    Ketone NEGATIVE  NEG mg/dL Bilirubin NEGATIVE  NEG      Blood LARGE (A) NEG      Urobilinogen 0.2 0.2 - 1.0 EU/dL    Nitrites NEGATIVE  NEG      Leukocyte Esterase NEGATIVE  NEG      WBC 0-4 0 - 4 /hpf    RBC 10-20 0 - 5 /hpf    Epithelial cells FEW FEW /lpf    Bacteria NEGATIVE  NEG /hpf    UA:UC IF INDICATED CULTURE NOT INDICATED BY UA RESULT CNI      Hyaline cast 0-2 0 - 5 /lpf   DRUG SCREEN, URINE    Collection Time: 03/30/18  1:30 AM   Result Value Ref Range    AMPHETAMINES POSITIVE (A) NEG      BARBITURATES NEGATIVE  NEG      BENZODIAZEPINES NEGATIVE  NEG      COCAINE NEGATIVE  NEG      METHADONE NEGATIVE  NEG      OPIATES NEGATIVE  NEG      PCP(PHENCYCLIDINE) NEGATIVE  NEG      THC (TH-CANNABINOL) POSITIVE (A) NEG      Drug screen comment (NOTE)        Radiologic Studies -   CT ABD PELV WO CONT   Final Result        CT Results  (Last 48 hours)               03/30/18 0243  CT ABD PELV WO CONT Final result    Impression:  IMPRESSION:   No acute findings. Bilateral nephrolithiasis. Narrative:  EXAM:  CT ABD PELV WO CONT       INDICATION: flank pain, hematuria  left flank pain       COMPARISON:       CONTRAST:  None. TECHNIQUE:    Thin axial images were obtained through the abdomen and pelvis. Coronal and   sagittal reconstructions were generated. Oral contrast was not administered. CT   dose reduction was achieved through use of a standardized protocol tailored for   this examination and automatic exposure control for dose modulation. The absence of intravenous contrast material reduces the sensitivity for   evaluation of the solid parenchymal organs of the abdomen. FINDINGS:    LUNG BASES: Clear. INCIDENTALLY IMAGED HEART AND MEDIASTINUM: Unremarkable. LIVER: No mass or biliary dilatation. GALLBLADDER: Unremarkable. SPLEEN: No mass. PANCREAS: No mass or ductal dilatation. ADRENALS: Unremarkable. KIDNEYS/URETERS: Bilateral nephrolithiasis. STOMACH: Unremarkable.    SMALL BOWEL: No dilatation or wall thickening. COLON: No dilatation or wall thickening. APPENDIX: Unremarkable. PERITONEUM: No ascites or pneumoperitoneum. RETROPERITONEUM: No lymphadenopathy or aortic aneurysm. REPRODUCTIVE ORGANS:   URINARY BLADDER: No mass or calculus. BONES: No destructive bone lesion. ADDITIONAL COMMENTS: Bilateral inguinal hernias. CXR Results  (Last 48 hours)    None            Medical Decision Making   I am the first provider for this patient. I reviewed the vital signs, available nursing notes, past medical history, past surgical history, family history and social history. Vital Signs-Reviewed the patient's vital signs. Patient Vitals for the past 12 hrs:   Temp Pulse Resp BP SpO2   03/30/18 0410 - 85 11 102/73 96 %   03/30/18 0359 - 85 12 110/85 98 %   03/30/18 0350 - 85 13 113/80 97 %   03/30/18 0343 - 86 20 99/73 99 %   03/30/18 0342 - 85 16 96/71 99 %   03/30/18 0330 - 85 15 99/69 96 %   03/30/18 0320 - 85 19 96/68 98 %   03/30/18 0300 - 85 17 97/68 96 %   03/30/18 0250 - 85 20 104/74 97 %   03/30/18 0249 - 85 13 - 96 %   03/30/18 0243 - - - 108/71 -   03/30/18 0230 - 85 12 114/72 96 %   03/30/18 0220 - 85 13 108/70 97 %   03/30/18 0210 - 85 17 115/71 98 %   03/30/18 0150 - 85 14 106/77 96 %   03/30/18 0140 - 85 17 112/80 98 %   03/30/18 0134 - 85 12 110/80 97 %   03/30/18 0130 - 84 19 99/64 98 %   03/30/18 0128 - - - - 97 %   03/30/18 0117 - 86 25 111/72 97 %   03/30/18 0115 - - - 105/67 98 %   03/30/18 0111 - - - 113/78 -   03/30/18 0022 97.9 °F (36.6 °C) 87 18 125/78 97 %       Pulse Oximetry Analysis - 98% on RA    Records Reviewed: Nursing Notes and Old Medical Records    Provider Notes (Medical Decision Making):   UTI, pyelonephritis, uretal stone in setting of known drug-seeking behavior in the past. Will obtain blood work, provide analgesic therapy, and assess need for CT scan. ED Course:   Initial assessment performed.  The patients presenting problems have been discussed, and they are in agreement with the care plan formulated and outlined with them. I have encouraged them to ask questions as they arise throughout their visit. Progress Note:  2:33 AM  Per nursing, pt is requesting additional pain medication. Written by Vivek Moise, ELIJAH Scribe, as dictated by Jean Huizar MD.    Progress Note:  3:54 AM  Pt's pain is much improved. Written by Vivek Moise, ELIJAH Scribe, as dictated by Jean Huizar MD.    Critical Care Time:   0    Disposition:  DISCHARGE NOTE  4:28 AM  The patient has been re-evaluated and is ready for discharge. Reviewed available results with patient. Counseled pt on diagnosis and care plan. Pt has expressed understanding, and all questions have been answered. Pt agrees with plan and agrees to F/U as recommended, or return to the ED if their sxs worsen. Discharge instructions have been provided and explained to the pt, along with reasons to return to the ED. Written by Kadeem Dukes ED Scribe, as dictated by Jean Huizar MD.    PLAN:  1. Discharge Medication List as of 3/30/2018  4:09 AM        2. Follow-up Information     Follow up With Details Comments [address], NP In 1 day As needed for ongoing pain management [address] [facility] [phone]      [facility] On 4/12/2018 as scheduled [address]  [facility] [id number]      \[facility] [place] [facility]\"" EMERGENCY DEPT  If symptoms worsen [address] [place] [address] Drive  [address] [facility]  [phone]        Return to ED if worse     Diagnosis     Clinical Impression:   1. Flank pain    2. Other chronic pain    3. Marijuana use    4. Hematuria, unspecified type    5. Atypical chest pain    6. Kidney stone        Attestations: This note is prepared by Kadeem Dukes, acting as Scribe for Jean Huizar MD.    The scribe's documentation has been prepared under my direction and personally reviewed by me in its entirety.  I confirm that the note above accurately reflects all work, treatment, procedures, and medical decision making performed by me.   Nacho Kumar MD

## 2018-03-30 NOTE — ED NOTES
MD has reviewed discharge instructions with the patient. The patient verbalized understanding. Patient refusing to wait in room for ride. Patient advised not to drive. Patient discharged to waiting room ambulatory to wait for ride.

## 2018-04-03 RX ORDER — BUSPIRONE HYDROCHLORIDE 7.5 MG/1
TABLET ORAL
Qty: 60 TAB | Refills: 1 | Status: SHIPPED | OUTPATIENT
Start: 2018-04-03 | End: 2018-06-03 | Stop reason: SDUPTHER

## 2018-04-23 DIAGNOSIS — F41.8 ANXIETY ABOUT HEALTH: ICD-10-CM

## 2018-04-23 RX ORDER — CLONAZEPAM 0.5 MG/1
TABLET ORAL
Qty: 30 TAB | Refills: 1 | OUTPATIENT
Start: 2018-04-23

## 2018-05-08 DIAGNOSIS — F41.8 ANXIETY ABOUT HEALTH: ICD-10-CM

## 2018-05-08 RX ORDER — CLONAZEPAM 0.5 MG/1
0.5 TABLET ORAL
Qty: 30 TAB | Refills: 0 | OUTPATIENT
Start: 2018-05-08 | End: 2018-05-15 | Stop reason: SDUPTHER

## 2018-05-10 ENCOUNTER — TELEPHONE (OUTPATIENT)
Dept: BEHAVIORAL/MENTAL HEALTH CLINIC | Age: 33
End: 2018-05-10

## 2018-05-15 DIAGNOSIS — F41.8 ANXIETY ABOUT HEALTH: ICD-10-CM

## 2018-05-15 RX ORDER — CLONAZEPAM 0.5 MG/1
0.5 TABLET ORAL
Qty: 30 TAB | Refills: 0 | OUTPATIENT
Start: 2018-05-15 | End: 2018-06-18 | Stop reason: SDUPTHER

## 2018-05-19 ENCOUNTER — HOSPITAL ENCOUNTER (EMERGENCY)
Age: 33
Discharge: HOME | End: 2018-05-19
Attending: EMERGENCY MEDICINE | Admitting: EMERGENCY MEDICINE
Payer: COMMERCIAL

## 2018-05-19 ENCOUNTER — APPOINTMENT (OUTPATIENT)
Dept: GENERAL RADIOLOGY | Age: 33
End: 2018-05-19
Attending: EMERGENCY MEDICINE
Payer: COMMERCIAL

## 2018-05-19 VITALS
BODY MASS INDEX: 25.61 KG/M2 | HEIGHT: 67 IN | RESPIRATION RATE: 19 BRPM | OXYGEN SATURATION: 96 % | HEART RATE: 85 BPM | DIASTOLIC BLOOD PRESSURE: 72 MMHG | WEIGHT: 163.14 LBS | SYSTOLIC BLOOD PRESSURE: 102 MMHG

## 2018-05-19 DIAGNOSIS — R07.89 MUSCULOSKELETAL CHEST PAIN: Primary | ICD-10-CM

## 2018-05-19 DIAGNOSIS — Z86.79 HISTORY OF HEART FAILURE: ICD-10-CM

## 2018-05-19 LAB
ALBUMIN SERPL-MCNC: 4.6 G/DL (ref 3.5–5)
ALBUMIN/GLOB SERPL: 1.5 {RATIO} (ref 1.1–2.2)
ALP SERPL-CCNC: 94 U/L (ref 45–117)
ALT SERPL-CCNC: 38 U/L (ref 12–78)
ANION GAP SERPL CALC-SCNC: 8 MMOL/L (ref 5–15)
AST SERPL-CCNC: 23 U/L (ref 15–37)
BASOPHILS # BLD: 0.1 K/UL (ref 0–0.1)
BASOPHILS NFR BLD: 1 % (ref 0–1)
BILIRUB SERPL-MCNC: 0.5 MG/DL (ref 0.2–1)
BNP SERPL-MCNC: 426 PG/ML (ref 0–125)
BUN SERPL-MCNC: 17 MG/DL (ref 6–20)
BUN/CREAT SERPL: 13 (ref 12–20)
CALCIUM SERPL-MCNC: 9 MG/DL (ref 8.5–10.1)
CHLORIDE SERPL-SCNC: 108 MMOL/L (ref 97–108)
CO2 SERPL-SCNC: 23 MMOL/L (ref 21–32)
CREAT SERPL-MCNC: 1.27 MG/DL (ref 0.7–1.3)
DIFFERENTIAL METHOD BLD: NORMAL
EOSINOPHIL # BLD: 0.3 K/UL (ref 0–0.4)
EOSINOPHIL NFR BLD: 5 % (ref 0–7)
ERYTHROCYTE [DISTWIDTH] IN BLOOD BY AUTOMATED COUNT: 12.5 % (ref 11.5–14.5)
GLOBULIN SER CALC-MCNC: 3.1 G/DL (ref 2–4)
GLUCOSE SERPL-MCNC: 110 MG/DL (ref 65–100)
HCT VFR BLD AUTO: 42.7 % (ref 36.6–50.3)
HGB BLD-MCNC: 14.9 G/DL (ref 12.1–17)
IMM GRANULOCYTES # BLD: 0 K/UL (ref 0–0.04)
IMM GRANULOCYTES NFR BLD AUTO: 0 % (ref 0–0.5)
LYMPHOCYTES # BLD: 1.6 K/UL (ref 0.8–3.5)
LYMPHOCYTES NFR BLD: 29 % (ref 12–49)
MCH RBC QN AUTO: 31 PG (ref 26–34)
MCHC RBC AUTO-ENTMCNC: 34.9 G/DL (ref 30–36.5)
MCV RBC AUTO: 88.8 FL (ref 80–99)
MONOCYTES # BLD: 0.5 K/UL (ref 0–1)
MONOCYTES NFR BLD: 9 % (ref 5–13)
NEUTS SEG # BLD: 3.2 K/UL (ref 1.8–8)
NEUTS SEG NFR BLD: 57 % (ref 32–75)
NRBC # BLD: 0 K/UL (ref 0–0.01)
NRBC BLD-RTO: 0 PER 100 WBC
PLATELET # BLD AUTO: 169 K/UL (ref 150–400)
PMV BLD AUTO: 11.3 FL (ref 8.9–12.9)
POTASSIUM SERPL-SCNC: 4 MMOL/L (ref 3.5–5.1)
PROT SERPL-MCNC: 7.7 G/DL (ref 6.4–8.2)
RBC # BLD AUTO: 4.81 M/UL (ref 4.1–5.7)
SODIUM SERPL-SCNC: 139 MMOL/L (ref 136–145)
TROPONIN I SERPL-MCNC: <0.04 NG/ML
WBC # BLD AUTO: 5.6 K/UL (ref 4.1–11.1)

## 2018-05-19 PROCEDURE — 83880 ASSAY OF NATRIURETIC PEPTIDE: CPT | Performed by: EMERGENCY MEDICINE

## 2018-05-19 PROCEDURE — 96375 TX/PRO/DX INJ NEW DRUG ADDON: CPT

## 2018-05-19 PROCEDURE — 99284 EMERGENCY DEPT VISIT MOD MDM: CPT

## 2018-05-19 PROCEDURE — 96374 THER/PROPH/DIAG INJ IV PUSH: CPT

## 2018-05-19 PROCEDURE — 84484 ASSAY OF TROPONIN QUANT: CPT | Performed by: EMERGENCY MEDICINE

## 2018-05-19 PROCEDURE — 93005 ELECTROCARDIOGRAM TRACING: CPT

## 2018-05-19 PROCEDURE — 71045 X-RAY EXAM CHEST 1 VIEW: CPT

## 2018-05-19 PROCEDURE — 85025 COMPLETE CBC W/AUTO DIFF WBC: CPT | Performed by: EMERGENCY MEDICINE

## 2018-05-19 PROCEDURE — 74011000250 HC RX REV CODE- 250: Performed by: EMERGENCY MEDICINE

## 2018-05-19 PROCEDURE — 74011250637 HC RX REV CODE- 250/637: Performed by: EMERGENCY MEDICINE

## 2018-05-19 PROCEDURE — 36415 COLL VENOUS BLD VENIPUNCTURE: CPT | Performed by: EMERGENCY MEDICINE

## 2018-05-19 PROCEDURE — 80053 COMPREHEN METABOLIC PANEL: CPT | Performed by: EMERGENCY MEDICINE

## 2018-05-19 PROCEDURE — 74011250636 HC RX REV CODE- 250/636: Performed by: EMERGENCY MEDICINE

## 2018-05-19 RX ORDER — KETOROLAC TROMETHAMINE 30 MG/ML
15 INJECTION, SOLUTION INTRAMUSCULAR; INTRAVENOUS
Status: COMPLETED | OUTPATIENT
Start: 2018-05-19 | End: 2018-05-19

## 2018-05-19 RX ORDER — KETOROLAC TROMETHAMINE 10 MG/1
10 TABLET, FILM COATED ORAL
Qty: 20 TAB | Refills: 0 | Status: SHIPPED | OUTPATIENT
Start: 2018-05-19 | End: 2018-06-18 | Stop reason: ALTCHOICE

## 2018-05-19 RX ORDER — CYCLOBENZAPRINE HCL 5 MG
5 TABLET ORAL
Qty: 20 TAB | Refills: 0 | Status: SHIPPED | OUTPATIENT
Start: 2018-05-19 | End: 2018-06-18 | Stop reason: ALTCHOICE

## 2018-05-19 RX ORDER — LORAZEPAM 1 MG/1
1 TABLET ORAL
Status: COMPLETED | OUTPATIENT
Start: 2018-05-19 | End: 2018-05-19

## 2018-05-19 RX ORDER — BUMETANIDE 0.25 MG/ML
2 INJECTION INTRAMUSCULAR; INTRAVENOUS
Status: COMPLETED | OUTPATIENT
Start: 2018-05-19 | End: 2018-05-19

## 2018-05-19 RX ORDER — ONDANSETRON 2 MG/ML
4 INJECTION INTRAMUSCULAR; INTRAVENOUS
Status: COMPLETED | OUTPATIENT
Start: 2018-05-19 | End: 2018-05-19

## 2018-05-19 RX ORDER — LIDOCAINE 4 G/100G
1 PATCH TOPICAL EVERY 12 HOURS
Qty: 5 PATCH | Refills: 0 | Status: SHIPPED | OUTPATIENT
Start: 2018-05-19 | End: 2018-06-18 | Stop reason: ALTCHOICE

## 2018-05-19 RX ADMIN — LORAZEPAM 1 MG: 1 TABLET ORAL at 22:02

## 2018-05-19 RX ADMIN — ONDANSETRON 4 MG: 2 INJECTION, SOLUTION INTRAMUSCULAR; INTRAVENOUS at 22:02

## 2018-05-19 RX ADMIN — KETOROLAC TROMETHAMINE 15 MG: 30 INJECTION, SOLUTION INTRAMUSCULAR at 22:02

## 2018-05-19 RX ADMIN — BUMETANIDE 2 MG: 0.25 INJECTION INTRAMUSCULAR; INTRAVENOUS at 22:34

## 2018-05-20 ENCOUNTER — APPOINTMENT (OUTPATIENT)
Dept: GENERAL RADIOLOGY | Age: 33
End: 2018-05-20
Attending: PHYSICIAN ASSISTANT
Payer: COMMERCIAL

## 2018-05-20 ENCOUNTER — HOSPITAL ENCOUNTER (EMERGENCY)
Age: 33
Discharge: HOME | End: 2018-05-20
Attending: EMERGENCY MEDICINE
Payer: COMMERCIAL

## 2018-05-20 VITALS
WEIGHT: 161.6 LBS | BODY MASS INDEX: 25.31 KG/M2 | RESPIRATION RATE: 14 BRPM | HEART RATE: 85 BPM | DIASTOLIC BLOOD PRESSURE: 94 MMHG | OXYGEN SATURATION: 98 % | SYSTOLIC BLOOD PRESSURE: 110 MMHG | TEMPERATURE: 97.8 F

## 2018-05-20 DIAGNOSIS — G89.29 OTHER CHRONIC PAIN: ICD-10-CM

## 2018-05-20 DIAGNOSIS — F11.29 OPIOID DEPENDENCE WITH OPIOID-INDUCED DISORDER (HCC): ICD-10-CM

## 2018-05-20 DIAGNOSIS — R07.9 CHEST PAIN, UNSPECIFIED TYPE: Primary | ICD-10-CM

## 2018-05-20 LAB
ALBUMIN SERPL-MCNC: 4.7 G/DL (ref 3.5–5)
ALBUMIN/GLOB SERPL: 1.4 {RATIO} (ref 1.1–2.2)
ALP SERPL-CCNC: 92 U/L (ref 45–117)
ALT SERPL-CCNC: 41 U/L (ref 12–78)
ANION GAP SERPL CALC-SCNC: 1 MMOL/L (ref 5–15)
AST SERPL-CCNC: 23 U/L (ref 15–37)
ATRIAL RATE: 86 BPM
BASOPHILS # BLD: 0.1 K/UL (ref 0–0.1)
BASOPHILS NFR BLD: 1 % (ref 0–1)
BILIRUB SERPL-MCNC: 0.5 MG/DL (ref 0.2–1)
BNP SERPL-MCNC: 327 PG/ML (ref 0–125)
BUN SERPL-MCNC: 26 MG/DL (ref 6–20)
BUN/CREAT SERPL: 15 (ref 12–20)
CALCIUM SERPL-MCNC: 9.1 MG/DL (ref 8.5–10.1)
CALCULATED P AXIS, ECG09: 101 DEGREES
CALCULATED R AXIS, ECG10: 175 DEGREES
CALCULATED T AXIS, ECG11: 21 DEGREES
CHLORIDE SERPL-SCNC: 107 MMOL/L (ref 97–108)
CK SERPL-CCNC: 143 U/L (ref 39–308)
CO2 SERPL-SCNC: 30 MMOL/L (ref 21–32)
CREAT SERPL-MCNC: 1.71 MG/DL (ref 0.7–1.3)
DIAGNOSIS, 93000: NORMAL
DIFFERENTIAL METHOD BLD: NORMAL
EOSINOPHIL # BLD: 0.2 K/UL (ref 0–0.4)
EOSINOPHIL NFR BLD: 3 % (ref 0–7)
ERYTHROCYTE [DISTWIDTH] IN BLOOD BY AUTOMATED COUNT: 12.6 % (ref 11.5–14.5)
GLOBULIN SER CALC-MCNC: 3.3 G/DL (ref 2–4)
GLUCOSE SERPL-MCNC: 89 MG/DL (ref 65–100)
HCT VFR BLD AUTO: 45 % (ref 36.6–50.3)
HGB BLD-MCNC: 15.4 G/DL (ref 12.1–17)
IMM GRANULOCYTES # BLD: 0 K/UL (ref 0–0.04)
IMM GRANULOCYTES NFR BLD AUTO: 0 % (ref 0–0.5)
LYMPHOCYTES # BLD: 1.3 K/UL (ref 0.8–3.5)
LYMPHOCYTES NFR BLD: 22 % (ref 12–49)
MCH RBC QN AUTO: 31 PG (ref 26–34)
MCHC RBC AUTO-ENTMCNC: 34.2 G/DL (ref 30–36.5)
MCV RBC AUTO: 90.7 FL (ref 80–99)
MONOCYTES # BLD: 0.4 K/UL (ref 0–1)
MONOCYTES NFR BLD: 8 % (ref 5–13)
NEUTS SEG # BLD: 3.8 K/UL (ref 1.8–8)
NEUTS SEG NFR BLD: 66 % (ref 32–75)
NRBC # BLD: 0 K/UL (ref 0–0.01)
NRBC BLD-RTO: 0 PER 100 WBC
P-R INTERVAL, ECG05: 214 MS
PLATELET # BLD AUTO: 160 K/UL (ref 150–400)
PMV BLD AUTO: 11 FL (ref 8.9–12.9)
POTASSIUM SERPL-SCNC: 4.6 MMOL/L (ref 3.5–5.1)
PROT SERPL-MCNC: 8 G/DL (ref 6.4–8.2)
Q-T INTERVAL, ECG07: 396 MS
QRS DURATION, ECG06: 102 MS
QTC CALCULATION (BEZET), ECG08: 473 MS
RBC # BLD AUTO: 4.96 M/UL (ref 4.1–5.7)
SODIUM SERPL-SCNC: 138 MMOL/L (ref 136–145)
TROPONIN I SERPL-MCNC: <0.04 NG/ML
VENTRICULAR RATE, ECG03: 86 BPM
WBC # BLD AUTO: 5.7 K/UL (ref 4.1–11.1)

## 2018-05-20 PROCEDURE — 96361 HYDRATE IV INFUSION ADD-ON: CPT

## 2018-05-20 PROCEDURE — 80053 COMPREHEN METABOLIC PANEL: CPT | Performed by: PHYSICIAN ASSISTANT

## 2018-05-20 PROCEDURE — 99284 EMERGENCY DEPT VISIT MOD MDM: CPT

## 2018-05-20 PROCEDURE — 96375 TX/PRO/DX INJ NEW DRUG ADDON: CPT

## 2018-05-20 PROCEDURE — 74011250636 HC RX REV CODE- 250/636: Performed by: EMERGENCY MEDICINE

## 2018-05-20 PROCEDURE — 74011250637 HC RX REV CODE- 250/637: Performed by: PHYSICIAN ASSISTANT

## 2018-05-20 PROCEDURE — 83880 ASSAY OF NATRIURETIC PEPTIDE: CPT | Performed by: PHYSICIAN ASSISTANT

## 2018-05-20 PROCEDURE — 82550 ASSAY OF CK (CPK): CPT | Performed by: EMERGENCY MEDICINE

## 2018-05-20 PROCEDURE — 36415 COLL VENOUS BLD VENIPUNCTURE: CPT | Performed by: PHYSICIAN ASSISTANT

## 2018-05-20 PROCEDURE — 71046 X-RAY EXAM CHEST 2 VIEWS: CPT

## 2018-05-20 PROCEDURE — 96374 THER/PROPH/DIAG INJ IV PUSH: CPT

## 2018-05-20 PROCEDURE — 85025 COMPLETE CBC W/AUTO DIFF WBC: CPT | Performed by: PHYSICIAN ASSISTANT

## 2018-05-20 PROCEDURE — 84484 ASSAY OF TROPONIN QUANT: CPT | Performed by: PHYSICIAN ASSISTANT

## 2018-05-20 PROCEDURE — 74011000258 HC RX REV CODE- 258: Performed by: EMERGENCY MEDICINE

## 2018-05-20 PROCEDURE — 93005 ELECTROCARDIOGRAM TRACING: CPT

## 2018-05-20 RX ORDER — PROMETHAZINE HYDROCHLORIDE 25 MG/1
25 TABLET ORAL
Status: DISCONTINUED | OUTPATIENT
Start: 2018-05-20 | End: 2018-05-20

## 2018-05-20 RX ORDER — DEXTROAMPHETAMINE SACCHARATE, AMPHETAMINE ASPARTATE, DEXTROAMPHETAMINE SULFATE AND AMPHETAMINE SULFATE 5; 5; 5; 5 MG/1; MG/1; MG/1; MG/1
20 TABLET ORAL DAILY
COMMUNITY
End: 2018-06-18 | Stop reason: ALTCHOICE

## 2018-05-20 RX ORDER — SODIUM CHLORIDE 9 MG/ML
1000 INJECTION, SOLUTION INTRAVENOUS ONCE
Status: COMPLETED | OUTPATIENT
Start: 2018-05-20 | End: 2018-05-20

## 2018-05-20 RX ORDER — ONDANSETRON 4 MG/1
4 TABLET, ORALLY DISINTEGRATING ORAL
Status: COMPLETED | OUTPATIENT
Start: 2018-05-20 | End: 2018-05-20

## 2018-05-20 RX ORDER — MORPHINE SULFATE 4 MG/ML
4 INJECTION INTRAVENOUS ONCE
Status: COMPLETED | OUTPATIENT
Start: 2018-05-20 | End: 2018-05-20

## 2018-05-20 RX ORDER — HYDROCODONE BITARTRATE AND ACETAMINOPHEN 5; 325 MG/1; MG/1
1 TABLET ORAL
Status: COMPLETED | OUTPATIENT
Start: 2018-05-20 | End: 2018-05-20

## 2018-05-20 RX ADMIN — SODIUM CHLORIDE 1000 ML: 900 INJECTION, SOLUTION INTRAVENOUS at 20:10

## 2018-05-20 RX ADMIN — MORPHINE SULFATE 4 MG: 4 INJECTION INTRAVENOUS at 20:10

## 2018-05-20 RX ADMIN — ONDANSETRON 4 MG: 4 TABLET, ORALLY DISINTEGRATING ORAL at 17:49

## 2018-05-20 RX ADMIN — PROMETHAZINE HYDROCHLORIDE 12.5 MG: 25 INJECTION, SOLUTION INTRAMUSCULAR; INTRAVENOUS at 20:09

## 2018-05-20 RX ADMIN — HYDROCODONE BITARTRATE AND ACETAMINOPHEN 1 TABLET: 5; 325 TABLET ORAL at 17:49

## 2018-05-20 NOTE — ED NOTES
Pacemaker interrogated per MD order. Information currently being transmitted and report will be faxed for MD to review.

## 2018-05-20 NOTE — ED NOTES
Dr. Shoshana Choe reviewed discharge instructions with the patient and parent. The patient and parent verbalized understanding. Vitals stable. Pt out to discharge.

## 2018-05-20 NOTE — DISCHARGE INSTRUCTIONS

## 2018-05-20 NOTE — ED PROVIDER NOTES
EMERGENCY DEPARTMENT HISTORY AND PHYSICAL EXAM      Date: 5/19/2018  Patient Name: Roxann Mahajan    History of Presenting Illness     Chief Complaint   Patient presents with    Chest Pain     \"while i was lifting these computers and my heart is already enlarged and pushes against my chest wall\"    Vomiting     1 episode       History Provided By: Patient    HPI: Roxann Mahajan, 28 y.o. male with PMHx significant for gout, asthma, CAD, and stroke, presents ambulatory to the ED with cc of mild to moderate, mid sternal CP that started today after picking up a computer monitor. He states he took gabapentin without relief. He denies any changes with food. Pt notes episode of NV and worsening leg swelling. He states he is on a diuretic and has been taking it as prescribed. Pt also reports worsening fatigue x the past few days. Pt stating he has \"problems with functionality. \" Otherwise without complaint. There are no other complaints, changes, or physical findings at this time. PCP: Jay Banda NP   Cardiology: Dr. John Singer    Current Outpatient Prescriptions   Medication Sig Dispense Refill    ketorolac (TORADOL) 10 mg tablet Take 1 Tab by mouth every six (6) hours as needed for Pain. 20 Tab 0    cyclobenzaprine (FLEXERIL) 5 mg tablet Take 1 Tab by mouth three (3) times daily as needed for Muscle Spasm(s). 20 Tab 0    lidocaine (ASPERCREME, LIDOCAINE,) 4 % patch 1 Patch by TransDERmal route every twelve (12) hours every twelve (12) hours. 5 Patch 0    clonazePAM (KLONOPIN) 0.5 mg tablet Take 1 Tab by mouth daily as needed. 30 Tab 0    busPIRone (BUSPAR) 7.5 mg tablet TAKE 1 TAB BY MOUTH TWO (2) TIMES A DAY. 60 Tab 1    zolpidem (AMBIEN) 5 mg tablet Take 5 mg by mouth nightly as needed for Sleep.  oxyCODONE-acetaminophen (PERCOCET) 5-325 mg per tablet Take 1 Tab by mouth every six (6) hours as needed for Pain. Max Daily Amount: 4 Tabs.  10 Tab 0    metoprolol succinate (TOPROL-XL) 50 mg XL tablet Take 1 Tab by mouth daily. 90 Tab 0    ondansetron (ZOFRAN ODT) 4 mg disintegrating tablet Take 1 Tab by mouth every eight (8) hours as needed for Nausea. 10 Tab 0    spironolactone (ALDACTONE) 25 mg tablet Take 1 Tab by mouth daily. 30 Tab 1    lisinopril (PRINIVIL, ZESTRIL) 20 mg tablet Take 20 mg by mouth daily. 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 Social History:  Social History   Substance Use Topics    Smoking status: Never Smoker    Smokeless tobacco: Never Used      Comment: advised not to start    Alcohol use No       Allergies: Allergies   Allergen Reactions    Betadine [Povidone-Iodine] Rash    Contrast Agent [Iodine] Itching     Need to pre-medicate with benadryl         Review of Systems   Review of Systems   Constitutional: Positive for fatigue. Negative for chills, diaphoresis and fever. HENT: Negative. Negative for congestion, ear pain, sore throat and trouble swallowing. Eyes: Negative. Negative for photophobia, pain, redness and visual disturbance. Respiratory: Negative. Negative for cough, chest tightness, shortness of breath and wheezing. Cardiovascular: Positive for chest pain and leg swelling. Negative for palpitations. Gastrointestinal: Positive for nausea and vomiting. Negative for abdominal pain, blood in stool and diarrhea. Genitourinary: Negative for dysuria and frequency. Musculoskeletal: Negative. Negative for back pain, joint swelling and neck pain. Skin: Negative. Neurological: Negative. Negative for seizures, syncope and headaches. Psychiatric/Behavioral: Negative. Negative for behavioral problems and confusion. The patient is not nervous/anxious. All other systems reviewed and are negative. Physical Exam   Physical Exam   Constitutional: He is oriented to person, place, and time. He appears well-developed and well-nourished. Pt gets tearful at times   HENT:   Head: Normocephalic and atraumatic. Eyes: Conjunctivae and EOM are normal.   Neck: Normal range of motion. Neck supple. Cardiovascular: Normal rate and regular rhythm. Pacemaker R upper chest wall. Reproducible substernal and mid sternal CP. Pulmonary/Chest: Effort normal and breath sounds normal. No respiratory distress. Abdominal: Soft. He exhibits no distension. There is tenderness in the epigastric area. Musculoskeletal: Normal range of motion. Neurological: He is alert and oriented to person, place, and time. Slightly drowsy with slurred speech   Skin: Skin is warm and dry. Psychiatric: His mood appears anxious. Nursing note and vitals reviewed.     Diagnostic Study Results     Labs -     Recent Results (from the past 12 hour(s))   EKG, 12 LEAD, INITIAL    Collection Time: 05/19/18  8:59 PM   Result Value Ref Range    Ventricular Rate 86 BPM    Atrial Rate 86 BPM    P-R Interval 214 ms    QRS Duration 102 ms    Q-T Interval 396 ms    QTC Calculation (Bezet) 473 ms    Calculated P Axis 101 degrees    Calculated R Axis 175 degrees    Calculated T Axis 21 degrees    Diagnosis       ** Suspect arm lead reversal, interpretation assumes no reversal  Atrial-paced rhythm with prolonged AV conduction  Right axis deviation  Pulmonary disease pattern  Incomplete right bundle branch block , plus right ventricular hypertrophy  Possible Inferior infarct (cited on or before 19-MAY-2018)  ST & Marked T wave abnormality, consider anterolateral ischemia  When compared with ECG of 30-MAR-2018 00:18,  No significant change was found     CBC WITH AUTOMATED DIFF Collection Time: 05/19/18  9:15 PM   Result Value Ref Range    WBC 5.6 4.1 - 11.1 K/uL    RBC 4.81 4.10 - 5.70 M/uL    HGB 14.9 12.1 - 17.0 g/dL    HCT 42.7 36.6 - 50.3 %    MCV 88.8 80.0 - 99.0 FL    MCH 31.0 26.0 - 34.0 PG    MCHC 34.9 30.0 - 36.5 g/dL    RDW 12.5 11.5 - 14.5 %    PLATELET 448 047 - 547 K/uL    MPV 11.3 8.9 - 12.9 FL    NRBC 0.0 0  WBC    ABSOLUTE NRBC 0.00 0.00 - 0.01 K/uL    NEUTROPHILS 57 32 - 75 %    LYMPHOCYTES 29 12 - 49 %    MONOCYTES 9 5 - 13 %    EOSINOPHILS 5 0 - 7 %    BASOPHILS 1 0 - 1 %    IMMATURE GRANULOCYTES 0 0.0 - 0.5 %    ABS. NEUTROPHILS 3.2 1.8 - 8.0 K/UL    ABS. LYMPHOCYTES 1.6 0.8 - 3.5 K/UL    ABS. MONOCYTES 0.5 0.0 - 1.0 K/UL    ABS. EOSINOPHILS 0.3 0.0 - 0.4 K/UL    ABS. BASOPHILS 0.1 0.0 - 0.1 K/UL    ABS. IMM. GRANS. 0.0 0.00 - 0.04 K/UL    DF AUTOMATED     METABOLIC PANEL, COMPREHENSIVE    Collection Time: 05/19/18  9:15 PM   Result Value Ref Range    Sodium 139 136 - 145 mmol/L    Potassium 4.0 3.5 - 5.1 mmol/L    Chloride 108 97 - 108 mmol/L    CO2 23 21 - 32 mmol/L    Anion gap 8 5 - 15 mmol/L    Glucose 110 (H) 65 - 100 mg/dL    BUN 17 6 - 20 MG/DL    Creatinine 1.27 0.70 - 1.30 MG/DL    BUN/Creatinine ratio 13 12 - 20      GFR est AA >60 >60 ml/min/1.73m2    GFR est non-AA >60 >60 ml/min/1.73m2    Calcium 9.0 8.5 - 10.1 MG/DL    Bilirubin, total 0.5 0.2 - 1.0 MG/DL    ALT (SGPT) 38 12 - 78 U/L    AST (SGOT) 23 15 - 37 U/L    Alk.  phosphatase 94 45 - 117 U/L    Protein, total 7.7 6.4 - 8.2 g/dL    Albumin 4.6 3.5 - 5.0 g/dL    Globulin 3.1 2.0 - 4.0 g/dL    A-G Ratio 1.5 1.1 - 2.2     NT-PRO BNP    Collection Time: 05/19/18  9:15 PM   Result Value Ref Range    NT pro- (H) 0 - 125 PG/ML   TROPONIN I    Collection Time: 05/19/18  9:15 PM   Result Value Ref Range    Troponin-I, Qt. <0.04 <0.05 ng/mL       Radiologic Studies -   XR CHEST PORT   Final Result        CT Results  (Last 48 hours)    None        CXR Results  (Last 48 hours) 05/19/18 2157  XR CHEST PORT Final result    Impression:  IMPRESSION: No acute cardiopulmonary process seen. Narrative:  EXAM:  XR CHEST PORT       INDICATION:  Chest Pain       COMPARISON:  2/20/2018       FINDINGS: A portable AP radiograph of the chest was obtained at 2154 hours. The   patient is on a cardiac monitor. The lungs are clear. The cardiac and   mediastinal contours and pulmonary vascularity are normal.  The bones and soft   tissues are grossly within normal limits. There is a single lead pacemaker in   place. Medical Decision Making   I am the first provider for this patient. I reviewed the vital signs, available nursing notes, past medical history, past surgical history, family history and social history. Vital Signs-Reviewed the patient's vital signs. Patient Vitals for the past 12 hrs:   Pulse Resp BP SpO2   05/19/18 2230 85 19 - 96 %   05/19/18 2215 85 12 102/72 95 %   05/19/18 2200 85 13 108/80 94 %   05/19/18 2145 85 16 110/77 96 %   05/19/18 2130 85 15 125/82 96 %   05/19/18 2115 - - 117/81 96 %   05/19/18 2058 86 17 134/78 98 %       Pulse Oximetry Analysis - 95% on RA    Cardiac Monitor:   Rate: 86 bpm  Rhythm: atrial paced with prolonged AV conduction     EKG interpretation: (Preliminary) 20:59  Rhythm: atrial paced with prolonged AV conductions; and regular . Rate (approx.): 86; Axis: normal; TX interval: normal; QRS interval: normal ; ST/T wave: normal; Other findings: pulmonary disease pattern. Written by Marcos Church ED Scribe, as dictated by Jose Acuña M.D. Records Reviewed: Nursing Notes, Old Medical Records, Previous electrocardiograms, Previous Radiology Studies and Previous Laboratory Studies    Provider Notes (Medical Decision Making):   Patient presents with CP. DDx:  ACS, Aortic dissection, PNA, PE, PTX, pericarditis, myocarditis, GERD, costochondritis, anxiety.   Concerned for MSK or anxiety given the HPI and Physical exam. Given pt cardiac hx will rule out heart failure and ACS. Will obtain labs, CXR, EKG and get Cardiology Consult PRN. - I have re-examined the patient and the patient denies chest pain on re-examination. The patient has had onset of chest pain greater than 8 hours and one negative set of cardiac enzymes or 2 negative sets of cardiac enzymes in the ER during this visit. The diagnosis, follow up, return instructions, test results, x-rays and medications have been discussed and reviewed with the patient. The patient has been given the opportunity to ask questions. The patient  expresses understanding of the diagnosis, follow-up and return instructions. The patient agrees to follow up with primary care or cardiology as directed and to return immediately if the chest pain worsens. The patient expresses understanding that although cardiac testing at this time is negative, a cardiac problem could still be present and that a follow-up appointment for further evaluation and risk factor modification is necessary to complete the evaluation of this complaint. ED Course:   Initial assessment performed. The patients presenting problems have been discussed, and they are in agreement with the care plan formulated and outlined with them. I have encouraged them to ask questions as they arise throughout their visit. 10:16 PM  Pt updated on lab results. Given pt has reproducible chest wall tenderness his symptoms are most likely MSK. Since pt has already tried Ibuprofen and Aleve will give Toradol and Flexeril. Written by Matt Herrera ED Scribe, as dictated by Gloria Sanchez M.D. Critical Care Time:   0    Disposition:  DISCHARGE NOTE  10:25 PM  The patient has been re-evaluated and is ready for discharge. Reviewed available results with patient. Counseled pt on diagnosis and care plan. Pt has expressed understanding, and all questions have been answered.  Pt agrees with plan and agrees to follow up as recommended, or return to the ED if their symptoms worsen. Discharge instructions have been provided and explained to the pt, along with reasons to return to the ED. PLAN:  1. Discharge Medication List as of 2018 10:18 PM      START taking these medications    Details   ketorolac (TORADOL) 10 mg tablet Take 1 Tab by mouth every six (6) hours as needed for Pain., Normal, Disp-20 Tab, R-0      cyclobenzaprine (FLEXERIL) 5 mg tablet Take 1 Tab by mouth three (3) times daily as needed for Muscle Spasm(s). , Normal, Disp-20 Tab, R-0      lidocaine (ASPERCREME, LIDOCAINE,) 4 % patch 1 Patch by TransDERmal route every twelve (12) hours every twelve (12) hours. , Normal, Disp-5 Patch, R-0         CONTINUE these medications which have NOT CHANGED    Details   clonazePAM (KLONOPIN) 0.5 mg tablet Take 1 Tab by mouth daily as needed. , Phone InCovering provider for [employer], PMPDisp-30 Tab, R-0      busPIRone (BUSPAR) 7.5 mg tablet TAKE 1 TAB BY MOUTH TWO (2) TIMES A DAY., Normal, Disp-60 Tab, R-1      zolpidem (AMBIEN) 5 mg tablet Take 5 mg by mouth nightly as needed for Sleep., Historical Med      oxyCODONE-acetaminophen (PERCOCET) 5-325 mg per tablet Take 1 Tab by mouth every six (6) hours as needed for Pain. Max Daily Amount: 4 Tabs., Print, Disp-10 Tab, R-0      metoprolol succinate (TOPROL-XL) 50 mg XL tablet Take 1 Tab by mouth daily. , Normal, Disp-90 Tab, R-0      ondansetron (ZOFRAN ODT) 4 mg disintegrating tablet Take 1 Tab by mouth every eight (8) hours as needed for Nausea., Normal, Disp-10 Tab, R-0      spironolactone (ALDACTONE) 25 mg tablet Take 1 Tab by mouth daily. , Normal, Disp-30 Tab, R-1      lisinopril (PRINIVIL, ZESTRIL) 20 mg tablet Take 20 mg by mouth daily. , Historical Med         STOP taking these medications       lidocaine (LIDODERM) 5 % Comments:   Reason for Stoppin.   Follow-up Information     Follow up With Details Comments Contact Info    Chavez Ledezma MD Schedule an appointment as soon as possible for a visit  [facility] [place] [id number]  [phone]          Return to ED if worse     Diagnosis     Clinical Impression:   1. Musculoskeletal chest pain    2. History of heart failure        Attestations: This note is prepared by Vicky Zavala, acting as Scribe for Donta Gonzales M.D. Donta Gonzales M.D: The scribe's documentation has been prepared under my direction and personally reviewed by me in its entirety. I confirm that the note above accurately reflects all work, treatment, procedures, and medical decision making performed by me.

## 2018-05-20 NOTE — ED NOTES
Assumed care of patient at this time. Pt states that he was lifting a \"computer tower\" yesterday and started experiencing substernal/sternal chest pain. 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 Pt was seen here last night for pain. Pts cardiac enzymes were tested and normal and patient was sent home with toradol and flexeril. Pt states that he needs something different for his pain because he takes percocet at home.  Pt reports shortness of breath with exertion and chest pain with movement

## 2018-05-20 NOTE — ED PROVIDER NOTES
EMERGENCY DEPARTMENT HISTORY AND PHYSICAL EXAM      Date: 5/20/2018  Patient Name: Roxann Mahajan     I have seen and evaluated this patient in the Express Care portion of triage for worsening chest pain since yesterday. He \"feels like there is muscle tearing in his anterior chest where his surgical scar is\". Pt was seen yesterday with the same complaint. The patient's care will begin now and orders have been placed. This patient will be seen and provided further care in the Emergency Room. Written by Leidy Banda, a scribe for Roger Rodriguez PA-C. History of Presenting Illness     Chief Complaint   Patient presents with    Chest Pain     pt was seen yesterday for the same pain in his chest.  Pt. was lifting a computer and started hacing a tearing pain, pt having the same pain today . pt was given 100 of Fentanyl, 4 mg of Zofran and 1 nitro by EMS       History Provided By: Patient    HPI: Roxann Mahajan, 28 y.o. male with PMHx significant for CAD, stroke, asthma, and gout, presents via EMS to the ED with cc of persistent, sharp, pulling, left sided chest pain x yesterday. Pt reports associated sx of dyspnea, BL lower extremity swelling, and intermittent palpitations while sleeping. He expresses he was seen for the same yesterday noting that after negative cardiac enzymes, EKG, and CXR he was discharged on Tramadol for discomfort and discharged home. Pt endorses taking the Tramadol WNR of his sx and is continuing with discomfort. Pt discloses upon waking up this morning he noticed BL lower extremity swelling leading him to call EMS for evaluation. He endorses the swelling has slightly improved. Pt reports his pain is exacerbated with deep inspiration but denies any relieving factors. Pt endorses he has a follow up appointment with his cardiologist on June 17th at [facility] but was unable to wait for follow up. He denies any fevers, chills, SOB, abdominal pain, nausea, vomiting, or diarrhea. There are no other complaints, changes, or physical findings at this time. PCP: Jw Meadows NP    Current Outpatient Prescriptions   Medication Sig Dispense Refill    dextroamphetamine-amphetamine (ADDERALL) 20 mg tablet Take 20 mg by mouth daily.  ketorolac (TORADOL) 10 mg tablet Take 1 Tab by mouth every six (6) hours as needed for Pain. 20 Tab 0    cyclobenzaprine (FLEXERIL) 5 mg tablet Take 1 Tab by mouth three (3) times daily as needed for Muscle Spasm(s). 20 Tab 0    clonazePAM (KLONOPIN) 0.5 mg tablet Take 1 Tab by mouth daily as needed. 30 Tab 0    busPIRone (BUSPAR) 7.5 mg tablet TAKE 1 TAB BY MOUTH TWO (2) TIMES A DAY. 60 Tab 1    zolpidem (AMBIEN) 5 mg tablet Take 5 mg by mouth nightly as needed for Sleep.  metoprolol succinate (TOPROL-XL) 50 mg XL tablet Take 1 Tab by mouth daily. 90 Tab 0    ondansetron (ZOFRAN ODT) 4 mg disintegrating tablet Take 1 Tab by mouth every eight (8) hours as needed for Nausea. 10 Tab 0    spironolactone (ALDACTONE) 25 mg tablet Take 1 Tab by mouth daily. 30 Tab 1    lisinopril (PRINIVIL, ZESTRIL) 20 mg tablet Take 20 mg by mouth daily.  lidocaine (ASPERCREME, LIDOCAINE,) 4 % patch 1 Patch by TransDERmal route every twelve (12) hours every twelve (12) hours. 5 Patch 0    oxyCODONE-acetaminophen (PERCOCET) 5-325 mg per tablet Take 1 Tab by mouth every six (6) hours as needed for Pain. Max Daily Amount: 4 Tabs. 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 No pertinent family history.     Social History:  Social History   Substance Use Topics    Smoking status: Never Smoker    Smokeless tobacco: Never Used      Comment: advised not to start    Alcohol use No       Allergies: Allergies   Allergen Reactions    Betadine [Povidone-Iodine] Rash    Contrast Agent [Iodine] Itching     Need to pre-medicate with benadryl         Review of Systems   Review of Systems   Constitutional: Negative for chills and fever. HENT: Negative for congestion and sore throat. Eyes: Negative for visual disturbance. Respiratory: Negative for cough and shortness of breath. (+) dyspnea    Cardiovascular: Positive for chest pain (left sided ), palpitations (intermittent ) and leg swelling (BL ). Gastrointestinal: Negative for abdominal pain, blood in stool, diarrhea, nausea and vomiting. Endocrine: Negative for polyuria. Genitourinary: Negative for dysuria and testicular pain. Musculoskeletal: Negative for arthralgias, joint swelling and myalgias. Skin: Negative for rash. Allergic/Immunologic: Negative for immunocompromised state. Neurological: Negative for weakness and headaches. Hematological: Does not bruise/bleed easily. Psychiatric/Behavioral: Negative for confusion. Physical Exam   Physical Exam   Constitutional: He is oriented to person, place, and time. He appears well-developed and well-nourished. HENT:   Head: Normocephalic and atraumatic. Moist mucous membranes   Eyes: Conjunctivae are normal. Pupils are equal, round, and reactive to light. Right eye exhibits no discharge. Left eye exhibits no discharge. Neck: Normal range of motion. Neck supple. No tracheal deviation present. Cardiovascular: Normal rate, regular rhythm and normal heart sounds. No murmur heard. Pulmonary/Chest: Effort normal and breath sounds normal. No respiratory distress. He has no wheezes. He has no rales. Midline incisional chest wall scar    Abdominal: Soft. Bowel sounds are normal. There is no tenderness. There is no rebound and no guarding. Musculoskeletal: Normal range of motion. He exhibits no edema, tenderness or deformity. Neurological: He is alert and oriented to person, place, and time. Skin: Skin is warm and dry. No rash noted. No erythema. Psychiatric: His behavior is normal.   Nursing note and vitals reviewed. Diagnostic Study Results     Labs -     Recent Results (from the past 12 hour(s))   NT-PRO BNP    Collection Time: 05/20/18  6:01 PM   Result Value Ref Range    NT pro- (H) 0 - 125 PG/ML   TROPONIN I    Collection Time: 05/20/18  6:01 PM   Result Value Ref Range    Troponin-I, Qt. <0.04 <0.05 ng/mL   CBC WITH AUTOMATED DIFF    Collection Time: 05/20/18  6:01 PM   Result Value Ref Range    WBC 5.7 4.1 - 11.1 K/uL    RBC 4.96 4.10 - 5.70 M/uL    HGB 15.4 12.1 - 17.0 g/dL    HCT 45.0 36.6 - 50.3 %    MCV 90.7 80.0 - 99.0 FL    MCH 31.0 26.0 - 34.0 PG    MCHC 34.2 30.0 - 36.5 g/dL    RDW 12.6 11.5 - 14.5 %    PLATELET 359 415 - 857 K/uL    MPV 11.0 8.9 - 12.9 FL    NRBC 0.0 0  WBC    ABSOLUTE NRBC 0.00 0.00 - 0.01 K/uL    NEUTROPHILS 66 32 - 75 %    LYMPHOCYTES 22 12 - 49 %    MONOCYTES 8 5 - 13 %    EOSINOPHILS 3 0 - 7 %    BASOPHILS 1 0 - 1 %    IMMATURE GRANULOCYTES 0 0.0 - 0.5 %    ABS. NEUTROPHILS 3.8 1.8 - 8.0 K/UL    ABS. LYMPHOCYTES 1.3 0.8 - 3.5 K/UL    ABS. MONOCYTES 0.4 0.0 - 1.0 K/UL    ABS. EOSINOPHILS 0.2 0.0 - 0.4 K/UL    ABS. BASOPHILS 0.1 0.0 - 0.1 K/UL    ABS. IMM.  GRANS. 0.0 0.00 - 0.04 K/UL    DF AUTOMATED     METABOLIC PANEL, COMPREHENSIVE    Collection Time: 05/20/18  6:01 PM   Result Value Ref Range    Sodium 138 136 - 145 mmol/L    Potassium 4.6 3.5 - 5.1 mmol/L    Chloride 107 97 - 108 mmol/L    CO2 30 21 - 32 mmol/L    Anion gap 1 (L) 5 - 15 mmol/L    Glucose 89 65 - 100 mg/dL    BUN 26 (H) 6 - 20 MG/DL    Creatinine 1.71 (H) 0.70 - 1.30 MG/DL    BUN/Creatinine ratio 15 12 - 20      GFR est AA 57 (L) >60 ml/min/1.73m2    GFR est non-AA 47 (L) >60 ml/min/1.73m2    Calcium 9.1 8.5 - 10.1 MG/DL    Bilirubin, total 0.5 0.2 - 1.0 MG/DL    ALT (SGPT) 41 12 - 78 U/L    AST (SGOT) 23 15 - 37 U/L    Alk. phosphatase 92 45 - 117 U/L    Protein, total 8.0 6.4 - 8.2 g/dL    Albumin 4.7 3.5 - 5.0 g/dL    Globulin 3.3 2.0 - 4.0 g/dL    A-G Ratio 1.4 1.1 - 2.2     CK    Collection Time: 05/20/18  6:01 PM   Result Value Ref Range     39 - 308 U/L       Radiologic Studies -   CXR Results  (Last 48 hours)               05/20/18 1735  XR CHEST PA LAT Final result    Impression:  IMPRESSION:   NORMAL CHEST. Narrative:  History: Chest pain. Frontal and lateral views of the chest show clear lungs. The heart, mediastinum   and pulmonary vasculature are normal.  The bony thorax is unremarkable. The   defibrillator is unchanged. 05/19/18 2157  XR CHEST PORT Final result    Impression:  IMPRESSION: No acute cardiopulmonary process seen. Narrative:  EXAM:  XR CHEST PORT       INDICATION:  Chest Pain       COMPARISON:  2/20/2018       FINDINGS: A portable AP radiograph of the chest was obtained at 2154 hours. The   patient is on a cardiac monitor. The lungs are clear. The cardiac and   mediastinal contours and pulmonary vascularity are normal.  The bones and soft   tissues are grossly within normal limits. There is a single lead pacemaker in   place. Medical Decision Making   I am the first provider for this patient. I reviewed the vital signs, available nursing notes, past medical history, past surgical history, family history and social history. Vital Signs-Reviewed the patient's vital signs.   Patient Vitals for the past 12 hrs:   Temp Pulse Resp BP SpO2   05/20/18 2015 - 85 14 (!) 110/94 98 %   05/20/18 2000 - 85 21 102/62 97 %   05/20/18 1945 - 85 19 110/78 98 %   05/20/18 1930 - 85 17 (!) 123/94 97 %   05/20/18 1915 - 85 19 119/83 98 %   05/20/18 1900 - 85 22 105/75 98 %   05/20/18 1855 - 84 19 105/73 97 %   05/20/18 1701 97.8 °F (36.6 °C) 85 18 115/90 97 %       Pulse Oximetry Analysis - 97% on room air    Cardiac Monitor:   Rate: 85 bpm  Rhythm: Normal Sinus Rhythm      EKG interpretation: (Preliminary) 17:04  Rhythm: Atrial-paced rhythm; and regular . Rate (approx.): 84; Axis: normal; AL interval: normal; QRS interval:100 ms; ST/T wave: normal; QT/QTc: 390/460 ms; Other findings: Unchanged from previous EKG. Written by Margarette Zabala ED Scribe, as dictated by Zuhair Hardwick DO. Records Reviewed: Nursing Notes, Old Medical Records, Previous electrocardiograms, Ambulance Run Sheet, Previous Radiology Studies and Previous Laboratory Studies    Provider Notes (Medical Decision Making):     Patient presents with CP. DDx:  ACS, Aortic dissection, PNA, PE, PTX, pericarditis, myocarditis, GERD, costochondritis, anxiety. Most likely chronic pain given the HPI and Physical exam. Will obtain labs, CXR, EKG. Pt with multiple recent visits for chest pain, seen here yesterday for the same. Pt with h/o opioid dependency, ddx also includes malingering.     - I have re-examined the patient and the patient denies chest pain on re-examination. The patient has had onset of chest pain greater than 8 hours and one negative set of cardiac enzymes or 2 negative sets of cardiac enzymes in the ER during this visit. The diagnosis, follow up, return instructions, test results, x-rays and medications have been discussed and reviewed with the patient. The patient has been given the opportunity to ask questions. The patient  expresses understanding of the diagnosis, follow-up and return instructions. The patient agrees to follow up with primary care or cardiology as directed and to return immediately if the chest pain worsens.   The patient expresses understanding that although cardiac testing at this time is negative, a cardiac problem could still be present and that a follow-up appointment for further evaluation and risk factor modification is necessary to complete the evaluation of this complaint. ED Course:   Initial assessment performed. The patients presenting problems have been discussed, and they are in agreement with the care plan formulated and outlined with them. I have encouraged them to ask questions as they arise throughout their visit. Progress Notes:    7:17 PM   The pt has been re-evaluated. He was advised that given the high frequency of ED visits, narcotic pain medication will not be used during stay in the ED. Pt became upset and is stating \"You are not helping me, I will go somewhere where I can get the help I need\" and is wishing to be discharged home. Pt's vital signs are stable and the pt appears to be in no acute distress. I am comfortable with discharging the pt home with instructions to follow up with his cardiologist and pain management. 7:30 PM  The pt has been re-evaluated. Pt has agreed to stay for work up. Will treat with one dose of Morphine during ED stay. Will continue to monitor. 8:16 PM   The pt has been re-evaluated. He has been updated on pacemaker interrogation and informed there is no acute process. He was updated on reassuring lab and CXR findings. Pt was informed of the plan for discharge and discussed the importance of follow up with his cardiologist and pain management specialist upon discharge. 8:22 PM   Andrés Mcleod DO has performed a bedside echo limited. 3 views obtained, PSLA, PSA, and AP4 show no pericardial effusion and does not appear to show significantly reduced EF. sub xyphoid view limited. Critical Care Time: 0 minutes    Disposition:  Discharge Note:  8:25 PM  The patient is ready for discharge. The patient's signs, symptoms, diagnosis, and discharge instruction have been discussed and the patient has conveyed their understanding. The patient is to follow up as recommended or return to the ER should their symptoms worsen. Plan has been discussed and the patient is in agreement. Written by Kenneth Sandoval [employer] ED Scribe, as dictated by [employer] DO    PLAN:  1. Discharge Medication List as of 5/20/2018  8:26 PM        2. Follow-up Information     Follow up With Details Comments Contact Info    Diana Gerber NP Call in 1 day  [address]  [address] [place] [address]  [phone]      [facility] EMERGENCY DEPT  If symptoms worsen [address] [facility]  [address] [facility]  [phone]    Your Cardiologist Schedule an appointment as soon as possible for a visit          Return to ED if worse     Diagnosis     Clinical Impression:   1. Chest pain, unspecified type    2. Other chronic pain    3. Opioid dependence with opioid-induced disorder ([facility] [address]        Attestations:    Attestation: This note is prepared by Miracle Salinas, acting as Scribe for [employer] DO. [employer], DO: The scribe's documentation has been prepared under my direction and personally reviewed by me in its entirety. I confirm that the note above accurately reflects all work, treatment, procedures, and medical decision making performed by me.

## 2018-05-21 LAB
ATRIAL RATE: 84 BPM
CALCULATED R AXIS, ECG10: 159 DEGREES
CALCULATED T AXIS, ECG11: 3 DEGREES
DIAGNOSIS, 93000: NORMAL
P-R INTERVAL, ECG05: 208 MS
Q-T INTERVAL, ECG07: 390 MS
QRS DURATION, ECG06: 100 MS
QTC CALCULATION (BEZET), ECG08: 460 MS
VENTRICULAR RATE, ECG03: 84 BPM

## 2018-05-21 NOTE — DISCHARGE INSTRUCTIONS
Chest Pain: Care Instructions  Your Care Instructions    There are many things that can cause chest pain. Some are not serious and will get better on their own in a few days. But some kinds of chest pain need more testing and treatment. Your doctor may have recommended a follow-up visit in the next 8 to 12 hours. If you are not getting better, you may need more tests or treatment. Even though your doctor has released you, you still need to watch for any problems. The doctor carefully checked you, but sometimes problems can develop later. If you have new symptoms or if your symptoms do not get better, get medical care right away. If you have worse or different chest pain or pressure that lasts more than 5 minutes or you passed out (lost consciousness), call 911 or seek other emergency help right away. A medical visit is only one step in your treatment. Even if you feel better, you still need to do what your doctor recommends, such as going to all suggested follow-up appointments and taking medicines exactly as directed. This will help you recover and help prevent future problems. How can you care for yourself at home? · Rest until you feel better. · Take your medicine exactly as prescribed. Call your doctor if you think you are having a problem with your medicine. · Do not drive after taking a prescription pain medicine. When should you call for help? Call 911 if:  ? · You passed out (lost consciousness). ? · You have severe difficulty breathing. ? · You have symptoms of a heart attack. These may include:  ¨ Chest pain or pressure, or a strange feeling in your chest.  ¨ Sweating. ¨ Shortness of breath. ¨ Nausea or vomiting. ¨ Pain, pressure, or a strange feeling in your back, neck, jaw, or upper belly or in one or both shoulders or arms. ¨ Lightheadedness or sudden weakness. ¨ A fast or irregular heartbeat.   After you call 911, the  may tell you to chew 1 adult-strength or 2 to 4 low-dose aspirin. Wait for an ambulance. Do not try to drive yourself. ?Call your doctor today if:  ? · You have any trouble breathing. ? · Your chest pain gets worse. ? · You are dizzy or lightheaded, or you feel like you may faint. ? · You are not getting better as expected. ? · You are having new or different chest pain. Where can you learn more? Go to http://mj-niels.info/. Enter A120 in the search box to learn more about \"Chest Pain: Care Instructions. \"  Current as of: March 20, 2017  Content Version: 11.4  © [address]63-4653 [employer]. Care instructions adapted under license by [employer] (which disclaims liability or warranty for this information). If you have questions about a medical condition or this instruction, always ask your healthcare professional. [address] any warranty or liability for your use of this information.

## 2018-05-21 NOTE — ED NOTES
Dr ____Rudd____________________ in to talk with patient and explain plan of care with  understanding and  written & verbal instructions.

## 2018-06-04 RX ORDER — BUSPIRONE HYDROCHLORIDE 7.5 MG/1
TABLET ORAL
Qty: 60 TAB | Refills: 1 | Status: SHIPPED | OUTPATIENT
Start: 2018-06-04 | End: 2018-06-18 | Stop reason: SDUPTHER

## 2018-06-12 RX ORDER — MIRTAZAPINE 15 MG/1
TABLET, ORALLY DISINTEGRATING ORAL
Qty: 90 TAB | Refills: 0 | OUTPATIENT
Start: 2018-06-12

## 2018-06-18 ENCOUNTER — OFFICE VISIT (OUTPATIENT)
Dept: BEHAVIORAL/MENTAL HEALTH CLINIC | Age: 33
End: 2018-06-18

## 2018-06-18 VITALS
HEIGHT: 67 IN | SYSTOLIC BLOOD PRESSURE: 132 MMHG | HEART RATE: 70 BPM | WEIGHT: 159 LBS | DIASTOLIC BLOOD PRESSURE: 80 MMHG | BODY MASS INDEX: 24.96 KG/M2

## 2018-06-18 DIAGNOSIS — F41.8 ANXIETY ABOUT HEALTH: ICD-10-CM

## 2018-06-18 DIAGNOSIS — F41.0 PANIC ATTACK: ICD-10-CM

## 2018-06-18 DIAGNOSIS — F06.4 ANXIETY DISORDER DUE TO MEDICAL CONDITION: Primary | ICD-10-CM

## 2018-06-18 RX ORDER — CLONAZEPAM 0.5 MG/1
0.5 TABLET ORAL
Qty: 30 TAB | Refills: 2 | Status: SHIPPED | OUTPATIENT
Start: 2018-06-18 | End: 2018-07-30 | Stop reason: ALTCHOICE

## 2018-06-18 RX ORDER — BUSPIRONE HYDROCHLORIDE 7.5 MG/1
7.5 TABLET ORAL 3 TIMES DAILY
Qty: 90 TAB | Refills: 1 | Status: SHIPPED | OUTPATIENT
Start: 2018-06-18 | End: 2018-07-30 | Stop reason: SDUPTHER

## 2018-06-18 RX ORDER — SERTRALINE HYDROCHLORIDE 50 MG/1
50 TABLET, FILM COATED ORAL DAILY
Qty: 30 TAB | Refills: 2 | Status: SHIPPED | OUTPATIENT
Start: 2018-06-18 | End: 2018-07-30 | Stop reason: SDUPTHER

## 2018-06-18 NOTE — MR AVS SNAPSHOT
[address] 
[phone] Patient: Cristian Segundo MRN: [id number] Visit Information Date & Time Provider Department Dept. Phone Encounter #  
 6/18/2018 10:30 AM Dylan Pop MD [address] [phone] [id number] Upcoming Health Maintenance Date Due Pneumococcal 19-64 Medium Risk (1 of 1 - PPSV23) 11/10/2004 DTaP/Tdap/Td series (1 - Tdap) 11/10/2006 Influenza Age 5 to Adult 8/1/2018 Allergies as of 6/18/2018  Review Complete On: 6/18/2018 By: Sahara Smith Severity Noted Reaction Type Reactions Betadine [Povidone-iodine]  03/19/2014    Rash Contrast Agent [Iodine]  03/30/2018    Itching Need to pre-medicate with benadryl Current Immunizations  Never Reviewed No immunizations on file. Not reviewed this visit You Were Diagnosed With   
  
 Codes Comments Anxiety about health     ICD-10-CM: F41.8 ICD-9-CM: 300.09 Vitals BP Pulse Height(growth percentile) Weight(growth percentile) BMI Smoking Status 132/80 70 5' 7\" (1.702 m) 159 lb (72.1 kg) 24.9 kg/m2 Never Smoker Vitals History BMI and BSA Data Body Mass Index Body Surface Area 24.9 kg/m 2 1.85 m 2 Preferred Pharmacy Pharmacy Name Phone [phone] [facility] [phone] Your Updated Medication List  
  
   
This list is accurate as of 6/18/18 10:55 AM.  Always use your most recent med list.  
  
  
  
  
 busPIRone 7.5 mg tablet Commonly known as:  BUSPAR Take 1 Tab by mouth three (3) times daily. Indications: Generalized Anxiety Disorder  
  
 clonazePAM 0.5 mg tablet Commonly known as:  Waynard Del Valle Take 1 Tab by mouth daily as needed. Indications: Panic Disorder  
  
 lisinopril 20 mg tablet Commonly known as:  [place]  
 Take 20 mg by mouth daily. metoprolol succinate 50 mg XL tablet Commonly known as:  TOPROL-XL Take 1 Tab by mouth daily. sertraline 50 mg tablet Commonly known as:  ZOLOFT Take 1 Tab by mouth daily. Indications: Generalized Anxiety Disorder, major depressive disorder, Panic Disorder  
  
 spironolactone 25 mg tablet Commonly known as:  ALDACTONE Take 1 Tab by mouth daily. Prescriptions Printed Refills  
 clonazePAM (KLONOPIN) 0.5 mg tablet 2 Sig: Take 1 Tab by mouth daily as needed. Indications: Panic Disorder Class: Print Route: Oral  
  
Prescriptions Sent to Pharmacy Refills  
 sertraline (ZOLOFT) 50 mg tablet 2 Sig: Take 1 Tab by mouth daily. Indications: Generalized Anxiety Disorder, major depressive disorder, Panic Disorder Class: Normal  
 Pharmacy: [address] Ph #: [phone] Route: Oral  
 busPIRone (BUSPAR) 7.5 mg tablet 1 Sig: Take 1 Tab by mouth three (3) times daily. Indications: Generalized Anxiety Disorder Class: Normal  
 Pharmacy: [address] Ph #: [phone] Route: Oral  
  
Introducing [facility]! [employer] introduces [employer] patient portal. Now you can access parts of your medical record, email your doctor's office, and request medication refills online. 1. In your internet browser, go to [email or web address] 2. Click on the First Time User? Click Here link in the Sign In box. You will see the New Member Sign Up page. 3. Enter your [employer] Access Code exactly as it appears below. You will not need to use this code after youve completed the sign-up process. If you do not sign up before the expiration date, you must request a new code. · [employer] Access Code: [email or web address] Expires: 8/17/2018  8:55 PM 
 
 4. Enter the last four digits of your Social Security Number (xxxx) and Date of Birth (mm/dd/yyyy) as indicated and click Submit. You will be taken to the next sign-up page. 5. Create a [employer] ID. This will be your [employer] login ID and cannot be changed, so think of one that is secure and easy to remember. 6. Create a [employer] password. You can change your password at any time. 7. Enter your Password Reset Question and Answer. This can be used at a later time if you forget your password. 8. Enter your e-mail address. You will receive e-mail notification when new information is available in [address] 9. Click Sign Up. You can now view and download portions of your medical record. 10. Click the Download Summary menu link to download a portable copy of your medical information. If you have questions, please visit the Frequently Asked Questions section of the [employer] website. Remember, [employer] is NOT to be used for urgent needs. For medical emergencies, dial 911. Now available from your iPhone and Android! Please provide this summary of care documentation to your next provider. Your primary care clinician is listed as Alen Ayala. If you have any questions after today's visit, please call [phone].
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
## 2018-07-10 ENCOUNTER — TELEPHONE (OUTPATIENT)
Dept: FAMILY MEDICINE CLINIC | Age: 33
End: 2018-07-10

## 2018-07-10 ENCOUNTER — OFFICE VISIT (OUTPATIENT)
Dept: FAMILY MEDICINE CLINIC | Age: 33
End: 2018-07-10

## 2018-07-10 VITALS
RESPIRATION RATE: 18 BRPM | HEIGHT: 67 IN | OXYGEN SATURATION: 97 % | DIASTOLIC BLOOD PRESSURE: 78 MMHG | HEART RATE: 85 BPM | SYSTOLIC BLOOD PRESSURE: 111 MMHG | WEIGHT: 157.6 LBS | TEMPERATURE: 97.9 F | BODY MASS INDEX: 24.74 KG/M2

## 2018-07-10 VITALS
BODY MASS INDEX: 24.64 KG/M2 | DIASTOLIC BLOOD PRESSURE: 78 MMHG | TEMPERATURE: 97.9 F | WEIGHT: 157 LBS | HEART RATE: 85 BPM | OXYGEN SATURATION: 97 % | RESPIRATION RATE: 18 BRPM | HEIGHT: 67 IN | SYSTOLIC BLOOD PRESSURE: 111 MMHG

## 2018-07-10 DIAGNOSIS — M10.9 ACUTE GOUT INVOLVING TOE OF RIGHT FOOT, UNSPECIFIED CAUSE: Primary | ICD-10-CM

## 2018-07-10 RX ORDER — TORSEMIDE 10 MG/1
TABLET ORAL
Refills: 11 | COMMUNITY
Start: 2018-06-14 | End: 2018-07-10 | Stop reason: SDUPTHER

## 2018-07-10 RX ORDER — HYDROCODONE BITARTRATE AND ACETAMINOPHEN 5; 325 MG/1; MG/1
1-2 TABLET ORAL
Qty: 12 TAB | Refills: 0 | Status: SHIPPED | OUTPATIENT
Start: 2018-07-10 | End: 2018-08-08 | Stop reason: ALTCHOICE

## 2018-07-10 RX ORDER — COLCHICINE 0.6 MG/1
0.6 TABLET ORAL 2 TIMES DAILY
Qty: 30 TAB | Refills: 0 | Status: SHIPPED | OUTPATIENT
Start: 2018-07-10 | End: 2018-09-17 | Stop reason: ALTCHOICE

## 2018-07-10 NOTE — PROGRESS NOTES
Joan Ortiz is a 28 y.o. male    Chief Complaint   Patient presents with    Foot Pain    Gout     1. Have you been to the ER, urgent care clinic since your last visit? Hospitalized since your last visit? No     2. Have you seen or consulted any other health care providers outside of the [address] [place] [address] since your last visit? Include any pap smears or colon screening.   No     Visit Vitals    /78    Pulse 85    Temp 97.9 °F (36.6 °C) (Oral)    Resp 18    Ht 5' 7\" (1.702 m)    Wt 157 lb 9.6 oz (71.5 kg)    SpO2 97%    BMI 24.68 kg/m2

## 2018-07-10 NOTE — PROGRESS NOTES
First episode 6 yrs ago. Took Cholcrys, Allopurinol. Off those meds w/o pbs 6 yrs. No known trigger other than seafood week ago. Sxs began last night. Right great toe. FH gout in father's side. Visit Vitals    /78    Pulse 85    Temp 97.9 °F (36.6 °C) (Oral)    Resp 18    Ht 5' 7\" (1.702 m)    Wt 157 lb (71.2 kg)    SpO2 97%    BMI 24.59 kg/m2       Patient alert and cooperative. Right great toe swollen, erythematous, unable to move without significant pain. Assessment:  1. Presumed acute gout attack. Has had history of same in the past. Family history is positive on his father's side. Plan:  1. Rx for Colchicine, to take one 2-3x a day. 2. Rx for Hydrocodone with Tylenol 5 mg, take 1-2 tabs every 6-8 hours for pain till the Colchicine kicks in.  3. Follow up once attack resolves with regular care provider to check uric acid level. May need to go back on Allopurinol depending on results. 4. Follow otherwise here prn.

## 2018-07-10 NOTE — PROGRESS NOTES
Homero Meadows is a 28 y.o. male    Chief Complaint   Patient presents with    Foot Pain     1. Have you been to the ER, urgent care clinic since your last visit? Hospitalized since your last visit? No     2. Have you seen or consulted any other health care providers outside of the [facility] since your last visit? Include any pap smears or colon screening.   No     Visit Vitals    /78    Pulse 85    Temp 97.9 °F (36.6 °C) (Oral)    Resp 18    Ht 5' 7\" (1.702 m)    Wt 157 lb (71.2 kg)    SpO2 97%    BMI 24.59 kg/m2

## 2018-07-10 NOTE — TELEPHONE ENCOUNTER
Patient called and left message with PSR that he was having difficulty walking. Writer returned phone call with no answer. Message left for pt to return call.

## 2018-07-13 NOTE — TELEPHONE ENCOUNTER
Spoke with patient after obtaining 2 patient identifiers  Per patient he is out of medication for gout. Writer asked about pain medication he received from previous office visit. Per patient he used it all. He was advised to apply ice off and on, elevate area and get rest. Patient verbalized understanding with no further issues noted at this time.

## 2018-07-30 ENCOUNTER — OFFICE VISIT (OUTPATIENT)
Dept: BEHAVIORAL/MENTAL HEALTH CLINIC | Age: 33
End: 2018-07-30

## 2018-07-30 VITALS
HEART RATE: 89 BPM | HEIGHT: 67 IN | WEIGHT: 157 LBS | BODY MASS INDEX: 24.64 KG/M2 | SYSTOLIC BLOOD PRESSURE: 118 MMHG | DIASTOLIC BLOOD PRESSURE: 77 MMHG

## 2018-07-30 DIAGNOSIS — F41.9 ANXIETY: Primary | ICD-10-CM

## 2018-07-30 DIAGNOSIS — Z86.59 HISTORY OF ADHD: ICD-10-CM

## 2018-07-30 RX ORDER — BUSPIRONE HYDROCHLORIDE 10 MG/1
10 TABLET ORAL 3 TIMES DAILY
Qty: 90 TAB | Refills: 2 | Status: SHIPPED | OUTPATIENT
Start: 2018-07-30 | End: 2018-09-17 | Stop reason: SDUPTHER

## 2018-07-30 RX ORDER — LORAZEPAM 0.5 MG/1
0.5 TABLET ORAL
Qty: 30 TAB | Refills: 2 | Status: SHIPPED | OUTPATIENT
Start: 2018-07-30 | End: 2018-09-17

## 2018-07-30 RX ORDER — SERTRALINE HYDROCHLORIDE 100 MG/1
100 TABLET, FILM COATED ORAL DAILY
Qty: 30 TAB | Refills: 2 | Status: SHIPPED | OUTPATIENT
Start: 2018-07-30 | End: 2018-09-17

## 2018-07-30 NOTE — PROGRESS NOTES
Sherry Reach  1985  28 y.o.  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 He is patient of [facility] last seen on February 12, 2018 when he was prescribed BuSpar 7.5 mg twice a day, Klonopin 0.5 mg daily, and his Remeron was decreased to 7.5 mg at bedtime and Vistaril was discontinued due to reported no benefits.     Patient was seen for the first time on an urgent appointment in coverage for [facility] on June 18, 2018 when he decided to try Zoloft to get proper help for his reported anxiety and depression and agreed to try Zoloft 25 mg to 75 mg and increase BuSpar 7.5 mg 3 times a day as he report little bit help with his generalized anxiety symptoms. He presented on time, was alert awake oriented to time person and place, was calm and cooperative polite and pleasant and report severe anxiety and ADHD symptoms. Patient report compliance with his medication, is able to tolerate increasing dose, and reported little bit improvement with his depression but is still report significant anxiety requiring him to take more Klonopin than prescribed and requested to be switched to quicker acting and agreed to try Ativan 0.5 mg only as needed for anxiety and panic attacks. He was provided with a lot of support and psychoeducation. He denies any current substance abuse and his urine drug screen done on March 30, 2018 was positive for meth and marijuana. MEDICAL HISTORY:    has a past medical history of Asthma; CAD (coronary artery disease); Gout; Heart failure ([facility] [address]  Pacemaker; Stroke [facility]); and Transposition great arteries. ALLERGIES:   Allergies   Allergen Reactions    Betadine [Povidone-Iodine] Rash    Contrast Agent [Iodine] Itching     Need to pre-medicate with benadryl       VITAL SIGNS:  /77  Pulse 89  Ht 5' 7\" (1.702 m)  Wt 71.2 kg (157 lb)  BMI 24.59 kg/m2    Current Outpatient Prescriptions   Medication Sig Dispense Refill    LORazepam (ATIVAN) 0.5 mg tablet Take 1 Tab by mouth nightly as needed for Anxiety. Max Daily Amount: 0.5 mg. Indications: anxiety 30 Tab 2    busPIRone (BUSPAR) 10 mg tablet Take 1 Tab by mouth three (3) times daily. Indications: Generalized Anxiety Disorder 90 Tab 2    sertraline (ZOLOFT) 100 mg tablet Take 1 Tab by mouth daily. Indications: Generalized Anxiety Disorder, major depressive disorder, Panic Disorder 30 Tab 2    colchicine 0.6 mg tablet Take 1 Tab by mouth two (2) times a day. 30 Tab 0    HYDROcodone-acetaminophen (NORCO) 5-325 mg per tablet Take 1-2 Tabs by mouth every six (6) hours as needed for Pain. Max Daily Amount: 8 Tabs. 12 Tab 0    metoprolol succinate (TOPROL-XL) 50 mg XL tablet Take 1 Tab by mouth daily. 90 Tab 0    spironolactone (ALDACTONE) 25 mg tablet Take 1 Tab by mouth daily. 30 Tab 1    lisinopril (PRINIVIL, ZESTRIL) 20 mg tablet Take 20 mg by mouth daily. MENTAL STATUS EXAMINATION:   Patient is a 28 y.o. male [facility] who looks slightly younger than his stated age. Patient appearance is appropriately dressed with good hygiene. Speech is regular rate and rhythm, fluent language, and thought process is linear, logical, and goal directed. Reported mood is anxious with mood congruent anxious affect. Patient denies any suicidal ideation, homicidal ideation, and auditory visual hallucination. Not observed to be delusional or paranoid. Insight, judgement, reliability and impulse control is limited. DIAGNOSIS:  Encounter Diagnoses   Name Primary?  Anxiety Yes    History of ADHD        PLAN:  1. MEDICATION: Patient report benefit and is tolerating well increasing dose of Zoloft and now takes 75 mg and agreed to increase to 100 mg and also BuSpar 7.5 mg 3 times a day which he agreed to increase 10 mg 3 times a day. His main concern today is anxiety especially in the context of his upcoming appointment at [facility] for heart transplant and that he accepted to take more clonazepam than prescribed and requested to be prescribed something short-acting and he denies any drug abuse since March of this year. He was provided with Ativan 0.5 mg #30 with 2 refills with a strong encouragement not to abuse it and he will bring me the results of urine drug screen done at [facility]. Patient was provided with psycho education, discussed risk/benefits, and expectations from medication changes. Patient agrees with plan. 2. PSYCHOTHERAPY: Patient was provided with supportive therapy, strongly encourage to seek psychotherapy. 3. MEDICAL CARE: Patient was strongly encourage to take their medical medications and follow up with their PCP on regular basis. Patient talking detail about his status of being on waiting lists to have cardiac transplant at [facility] where he is going for regular follow-up on Wednesday. 4. SUBSTANCE ABUSE CARE: Patient denies any substance abuse at this time and accepted to smoke marijuana back in March when his urine drug screen was positive. He is going to have urine drug screen at [facility] on Wednesday where he is on list for cardiac transplant and it is a requirement to have regular follow-up to check his status and keep him on the list.  He agreed to bring me results of drug screen and paperwork from [address] so I can scan it to his chart here. 5. FOLLOW UP:   Follow-up Disposition:  Return in about 4 weeks (around 8/27/2018) for Medication management.

## 2018-07-30 NOTE — MR AVS SNAPSHOT
[address] [facility] [address] 
[phone] Patient: Gabriel Brar MRN: [id number] Visit Information Date & Time Provider Department Dept. Phone Encounter #  
 7/30/2018 10:00 AM Tom Marrero MD [address] [facility] [phone] [id number] Upcoming Health Maintenance Date Due Pneumococcal 19-64 Medium Risk (1 of 1 - PPSV23) 11/10/2004 DTaP/Tdap/Td series (1 - Tdap) 11/10/2006 Influenza Age 5 to Adult 8/1/2018 Allergies as of 7/30/2018  Review Complete On: 7/30/2018 By: Jessica Harry Severity Noted Reaction Type Reactions Betadine [Povidone-iodine]  03/19/2014    Rash Contrast Agent [Iodine]  03/30/2018    Itching Need to pre-medicate with benadryl Current Immunizations  Never Reviewed No immunizations on file. Not reviewed this visit You Were Diagnosed With   
  
 Codes Comments Anxiety    -  Primary ICD-10-CM: F41.9 ICD-9-CM: 300.00 Vitals BP Pulse Height(growth percentile) Weight(growth percentile) BMI Smoking Status 118/77 89 5' 7\" (1.702 m) 157 lb (71.2 kg) 24.59 kg/m2 Never Smoker Vitals History BMI and BSA Data Body Mass Index Body Surface Area 24.59 kg/m 2 1.83 m 2 Preferred Pharmacy Pharmacy Name Phone [id number] [facility] [phone] Your Updated Medication List  
  
   
This list is accurate as of 7/30/18 10:19 AM.  Always use your most recent med list.  
  
  
  
  
 busPIRone 10 mg tablet Commonly known as:  BUSPAR Take 1 Tab by mouth three (3) times daily. Indications: Generalized Anxiety Disorder  
  
 colchicine 0.6 mg tablet Take 1 Tab by mouth two (2) times a day. HYDROcodone-acetaminophen 5-325 mg per tablet Commonly known as:  Annamary Safia  
 Take 1-2 Tabs by mouth every six (6) hours as needed for Pain. Max Daily Amount: 8 Tabs. lisinopril 20 mg tablet Commonly known as:  Donnald Code Take 20 mg by mouth daily. LORazepam 0.5 mg tablet Commonly known as:  ATIVAN Take 1 Tab by mouth nightly as needed for Anxiety. Max Daily Amount: 0.5 mg. Indications: anxiety  
  
 metoprolol succinate 50 mg XL tablet Commonly known as:  TOPROL-XL Take 1 Tab by mouth daily. sertraline 100 mg tablet Commonly known as:  ZOLOFT Take 1 Tab by mouth daily. Indications: Generalized Anxiety Disorder, major depressive disorder, Panic Disorder  
  
 spironolactone 25 mg tablet Commonly known as:  ALDACTONE Take 1 Tab by mouth daily. Prescriptions Printed Refills LORazepam (ATIVAN) 0.5 mg tablet 2 Sig: Take 1 Tab by mouth nightly as needed for Anxiety. Max Daily Amount: 0.5 mg. Indications: anxiety Class: Print Route: Oral  
  
Prescriptions Sent to Pharmacy Refills  
 busPIRone (BUSPAR) 10 mg tablet 2 Sig: Take 1 Tab by mouth three (3) times daily. Indications: Generalized Anxiety Disorder Class: Normal  
 Pharmacy: [address] Ph #: [phone] Route: Oral  
 sertraline (ZOLOFT) 100 mg tablet 2 Sig: Take 1 Tab by mouth daily. Indications: Generalized Anxiety Disorder, major depressive disorder, Panic Disorder Class: Normal  
 Pharmacy: [address] Ph #: [phone] Route: Oral  
  
Introducing [facility]! [employer] introduces [employer] patient portal. Now you can access parts of your medical record, email your doctor's office, and request medication refills online. 1. In your internet browser, go to [email or web address] 2. Click on the First Time User? Click Here link in the Sign In box.  You will see the New Member Sign Up page. 3. Enter your [employer] Access Code exactly as it appears below. You will not need to use this code after youve completed the sign-up process. If you do not sign up before the expiration date, you must request a new code. · [employer] Access Code: [email or web address] Expires: 8/17/2018  8:55 PM 
 
4. Enter the last four digits of your Social Security Number (xxxx) and Date of Birth (mm/dd/yyyy) as indicated and click Submit. You will be taken to the next sign-up page. 5. Create a [employer] ID. This will be your [employer] login ID and cannot be changed, so think of one that is secure and easy to remember. 6. Create a [employer] password. You can change your password at any time. 7. Enter your Password Reset Question and Answer. This can be used at a later time if you forget your password. 8. Enter your e-mail address. You will receive e-mail notification when new information is available in [address]. 9. Click Sign Up. You can now view and download portions of your medical record. 10. Click the Download Summary menu link to download a portable copy of your medical information. If you have questions, please visit the Frequently Asked Questions section of the [employer] website. Remember, [employer] is NOT to be used for urgent needs. For medical emergencies, dial 911. Now available from your iPhone and Android! Please provide this summary of care documentation to your next provider. Your primary care clinician is listed as Danni Bajwa. If you have any questions after today's visit, please call [phone].

## 2018-08-06 ENCOUNTER — APPOINTMENT (OUTPATIENT)
Dept: CT IMAGING | Age: 33
End: 2018-08-06
Attending: EMERGENCY MEDICINE
Payer: COMMERCIAL

## 2018-08-06 ENCOUNTER — APPOINTMENT (OUTPATIENT)
Dept: GENERAL RADIOLOGY | Age: 33
End: 2018-08-06
Attending: EMERGENCY MEDICINE
Payer: COMMERCIAL

## 2018-08-06 ENCOUNTER — HOSPITAL ENCOUNTER (EMERGENCY)
Age: 33
Discharge: HOME | End: 2018-08-07
Attending: EMERGENCY MEDICINE
Payer: COMMERCIAL

## 2018-08-06 DIAGNOSIS — R10.9 ACUTE LEFT FLANK PAIN: ICD-10-CM

## 2018-08-06 DIAGNOSIS — R07.9 CHEST PAIN OF UNCERTAIN ETIOLOGY: ICD-10-CM

## 2018-08-06 DIAGNOSIS — N23 URETERAL COLIC: Primary | ICD-10-CM

## 2018-08-06 DIAGNOSIS — N20.0 KIDNEY STONE: ICD-10-CM

## 2018-08-06 LAB
ALBUMIN SERPL-MCNC: 4.8 G/DL (ref 3.5–5)
ALBUMIN/GLOB SERPL: 1.3 {RATIO} (ref 1.1–2.2)
ALP SERPL-CCNC: 86 U/L (ref 45–117)
ALT SERPL-CCNC: 34 U/L (ref 12–78)
AMPHET UR QL SCN: POSITIVE
ANION GAP SERPL CALC-SCNC: 6 MMOL/L (ref 5–15)
APPEARANCE UR: CLEAR
AST SERPL-CCNC: 21 U/L (ref 15–37)
BACTERIA URNS QL MICRO: NEGATIVE /HPF
BARBITURATES UR QL SCN: NEGATIVE
BASOPHILS # BLD: 0.1 K/UL (ref 0–0.1)
BASOPHILS NFR BLD: 1 % (ref 0–1)
BENZODIAZ UR QL: NEGATIVE
BILIRUB SERPL-MCNC: 1.1 MG/DL (ref 0.2–1)
BILIRUB UR QL: NEGATIVE
BUN SERPL-MCNC: 22 MG/DL (ref 6–20)
BUN/CREAT SERPL: 15 (ref 12–20)
CALCIUM SERPL-MCNC: 9.2 MG/DL (ref 8.5–10.1)
CANNABINOIDS UR QL SCN: NEGATIVE
CHLORIDE SERPL-SCNC: 101 MMOL/L (ref 97–108)
CK SERPL-CCNC: 91 U/L (ref 39–308)
CO2 SERPL-SCNC: 29 MMOL/L (ref 21–32)
COCAINE UR QL SCN: NEGATIVE
COLOR UR: NORMAL
CREAT SERPL-MCNC: 1.42 MG/DL (ref 0.7–1.3)
D DIMER PPP FEU-MCNC: 0.22 MG/L FEU (ref 0–0.65)
DIFFERENTIAL METHOD BLD: NORMAL
DRUG SCRN COMMENT,DRGCM: ABNORMAL
EOSINOPHIL # BLD: 0.1 K/UL (ref 0–0.4)
EOSINOPHIL NFR BLD: 2 % (ref 0–7)
EPITH CASTS URNS QL MICRO: NORMAL /LPF
ERYTHROCYTE [DISTWIDTH] IN BLOOD BY AUTOMATED COUNT: 13.1 % (ref 11.5–14.5)
GLOBULIN SER CALC-MCNC: 3.8 G/DL (ref 2–4)
GLUCOSE SERPL-MCNC: 84 MG/DL (ref 65–100)
GLUCOSE UR STRIP.AUTO-MCNC: NEGATIVE MG/DL
HCT VFR BLD AUTO: 46.8 % (ref 36.6–50.3)
HGB BLD-MCNC: 16.5 G/DL (ref 12.1–17)
HGB UR QL STRIP: NEGATIVE
HYALINE CASTS URNS QL MICRO: NORMAL /LPF (ref 0–5)
IMM GRANULOCYTES # BLD: 0 K/UL (ref 0–0.04)
IMM GRANULOCYTES NFR BLD AUTO: 0 % (ref 0–0.5)
KETONES UR QL STRIP.AUTO: NEGATIVE MG/DL
LEUKOCYTE ESTERASE UR QL STRIP.AUTO: NEGATIVE
LYMPHOCYTES # BLD: 1.5 K/UL (ref 0.8–3.5)
LYMPHOCYTES NFR BLD: 19 % (ref 12–49)
MCH RBC QN AUTO: 31.3 PG (ref 26–34)
MCHC RBC AUTO-ENTMCNC: 35.3 G/DL (ref 30–36.5)
MCV RBC AUTO: 88.6 FL (ref 80–99)
METHADONE UR QL: NEGATIVE
MONOCYTES # BLD: 0.6 K/UL (ref 0–1)
MONOCYTES NFR BLD: 8 % (ref 5–13)
NEUTS SEG # BLD: 5.5 K/UL (ref 1.8–8)
NEUTS SEG NFR BLD: 71 % (ref 32–75)
NITRITE UR QL STRIP.AUTO: NEGATIVE
NRBC # BLD: 0 K/UL (ref 0–0.01)
NRBC BLD-RTO: 0 PER 100 WBC
OPIATES UR QL: NEGATIVE
PCP UR QL: NEGATIVE
PH UR STRIP: 5 [PH] (ref 5–8)
PLATELET # BLD AUTO: 186 K/UL (ref 150–400)
PMV BLD AUTO: 11.4 FL (ref 8.9–12.9)
POTASSIUM SERPL-SCNC: 3.8 MMOL/L (ref 3.5–5.1)
PROT SERPL-MCNC: 8.6 G/DL (ref 6.4–8.2)
PROT UR STRIP-MCNC: NEGATIVE MG/DL
RBC # BLD AUTO: 5.28 M/UL (ref 4.1–5.7)
RBC #/AREA URNS HPF: NORMAL /HPF (ref 0–5)
SODIUM SERPL-SCNC: 136 MMOL/L (ref 136–145)
SP GR UR REFRACTOMETRY: 1.01 (ref 1–1.03)
TROPONIN I SERPL-MCNC: <0.05 NG/ML
UA: UC IF INDICATED,UAUC: NORMAL
UROBILINOGEN UR QL STRIP.AUTO: 0.2 EU/DL (ref 0.2–1)
WBC # BLD AUTO: 7.8 K/UL (ref 4.1–11.1)
WBC URNS QL MICRO: NORMAL /HPF (ref 0–4)

## 2018-08-06 PROCEDURE — 85379 FIBRIN DEGRADATION QUANT: CPT | Performed by: EMERGENCY MEDICINE

## 2018-08-06 PROCEDURE — 80307 DRUG TEST PRSMV CHEM ANLYZR: CPT | Performed by: EMERGENCY MEDICINE

## 2018-08-06 PROCEDURE — 82550 ASSAY OF CK (CPK): CPT | Performed by: EMERGENCY MEDICINE

## 2018-08-06 PROCEDURE — 93005 ELECTROCARDIOGRAM TRACING: CPT

## 2018-08-06 PROCEDURE — 80053 COMPREHEN METABOLIC PANEL: CPT | Performed by: EMERGENCY MEDICINE

## 2018-08-06 PROCEDURE — 81001 URINALYSIS AUTO W/SCOPE: CPT | Performed by: EMERGENCY MEDICINE

## 2018-08-06 PROCEDURE — 74176 CT ABD & PELVIS W/O CONTRAST: CPT

## 2018-08-06 PROCEDURE — 96361 HYDRATE IV INFUSION ADD-ON: CPT

## 2018-08-06 PROCEDURE — 71046 X-RAY EXAM CHEST 2 VIEWS: CPT

## 2018-08-06 PROCEDURE — 84484 ASSAY OF TROPONIN QUANT: CPT | Performed by: EMERGENCY MEDICINE

## 2018-08-06 PROCEDURE — 74011250636 HC RX REV CODE- 250/636: Performed by: EMERGENCY MEDICINE

## 2018-08-06 PROCEDURE — 99285 EMERGENCY DEPT VISIT HI MDM: CPT

## 2018-08-06 PROCEDURE — 85025 COMPLETE CBC W/AUTO DIFF WBC: CPT | Performed by: EMERGENCY MEDICINE

## 2018-08-06 PROCEDURE — 96374 THER/PROPH/DIAG INJ IV PUSH: CPT

## 2018-08-06 PROCEDURE — 36415 COLL VENOUS BLD VENIPUNCTURE: CPT | Performed by: EMERGENCY MEDICINE

## 2018-08-06 PROCEDURE — 96375 TX/PRO/DX INJ NEW DRUG ADDON: CPT

## 2018-08-06 PROCEDURE — 96376 TX/PRO/DX INJ SAME DRUG ADON: CPT

## 2018-08-06 RX ORDER — KETOROLAC TROMETHAMINE 30 MG/ML
15 INJECTION, SOLUTION INTRAMUSCULAR; INTRAVENOUS
Status: COMPLETED | OUTPATIENT
Start: 2018-08-06 | End: 2018-08-06

## 2018-08-06 RX ORDER — TORSEMIDE 10 MG/1
10 TABLET ORAL DAILY
COMMUNITY
End: 2019-03-13

## 2018-08-06 RX ORDER — TAMSULOSIN HYDROCHLORIDE 0.4 MG/1
0.4 CAPSULE ORAL DAILY
Qty: 5 CAP | Refills: 0 | Status: SHIPPED | OUTPATIENT
Start: 2018-08-06 | End: 2018-09-17 | Stop reason: ALTCHOICE

## 2018-08-06 RX ORDER — FENTANYL CITRATE 50 UG/ML
50 INJECTION, SOLUTION INTRAMUSCULAR; INTRAVENOUS
Status: COMPLETED | OUTPATIENT
Start: 2018-08-06 | End: 2018-08-06

## 2018-08-06 RX ORDER — ONDANSETRON 2 MG/ML
4 INJECTION INTRAMUSCULAR; INTRAVENOUS
Status: COMPLETED | OUTPATIENT
Start: 2018-08-06 | End: 2018-08-06

## 2018-08-06 RX ORDER — KETOROLAC TROMETHAMINE 10 MG/1
10 TABLET, FILM COATED ORAL
Qty: 20 TAB | Refills: 0 | Status: SHIPPED | OUTPATIENT
Start: 2018-08-06 | End: 2018-08-08 | Stop reason: ALTCHOICE

## 2018-08-06 RX ORDER — ONDANSETRON 4 MG/1
4 TABLET, ORALLY DISINTEGRATING ORAL
Qty: 10 TAB | Refills: 0 | Status: SHIPPED | OUTPATIENT
Start: 2018-08-06 | End: 2020-05-28 | Stop reason: ALTCHOICE

## 2018-08-06 RX ADMIN — KETOROLAC TROMETHAMINE 15 MG: 30 INJECTION, SOLUTION INTRAMUSCULAR at 23:43

## 2018-08-06 RX ADMIN — ONDANSETRON 4 MG: 2 INJECTION, SOLUTION INTRAMUSCULAR; INTRAVENOUS at 21:58

## 2018-08-06 RX ADMIN — KETOROLAC TROMETHAMINE 15 MG: 30 INJECTION, SOLUTION INTRAMUSCULAR at 21:58

## 2018-08-06 RX ADMIN — FENTANYL CITRATE 50 MCG: 50 INJECTION, SOLUTION INTRAMUSCULAR; INTRAVENOUS at 23:44

## 2018-08-06 RX ADMIN — SODIUM CHLORIDE 1000 ML: 900 INJECTION, SOLUTION INTRAVENOUS at 21:58

## 2018-08-07 VITALS
BODY MASS INDEX: 23.67 KG/M2 | TEMPERATURE: 97.7 F | WEIGHT: 150.79 LBS | SYSTOLIC BLOOD PRESSURE: 101 MMHG | OXYGEN SATURATION: 98 % | HEART RATE: 85 BPM | HEIGHT: 67 IN | RESPIRATION RATE: 11 BRPM | DIASTOLIC BLOOD PRESSURE: 67 MMHG

## 2018-08-07 LAB
ATRIAL RATE: 84 BPM
CALCULATED P AXIS, ECG09: 113 DEGREES
CALCULATED R AXIS, ECG10: -177 DEGREES
CALCULATED T AXIS, ECG11: 35 DEGREES
DIAGNOSIS, 93000: NORMAL
P-R INTERVAL, ECG05: 218 MS
Q-T INTERVAL, ECG07: 398 MS
QRS DURATION, ECG06: 118 MS
QTC CALCULATION (BEZET), ECG08: 470 MS
VENTRICULAR RATE, ECG03: 84 BPM

## 2018-08-07 NOTE — ED PROVIDER NOTES
EMERGENCY DEPARTMENT HISTORY AND PHYSICAL EXAM    Date: 8/6/2018  Patient Name: Margarito Neil    History of Presenting Illness     Chief Complaint   Patient presents with    Chest Pain     pt states, \"I had an angina attack last night, I took some nitro and I've been having shortness of breath. \"       History Provided By: Patient    HPI: Margarito Neil is a 28 y.o. male, pmhx CAD, Asthma, CHF, Stroke, who presents ambulatory to the ED c/o progressively worsening, sharp left flank pain x yesterday. Pt reports associated pressuring dysuria, mild hematuria, nausea and vomiting. He notes his current sxs are consistent with those of past kidney stones. Pt reports taking Tylenol at home without any relief. The pt is additionally complaining of acute on chronic angina secondary to CHF. Pt reports mid-sternal, pressuring chest pain with mild shortness of breath x2 days. Pt states he is followed at [facility] for his heart condition and is in the proceess of getting on the transplant list. Pt specifically denies any fever, congestion, cough, abdominal pain, diarrhea. PCP: Deb Ervin NP    PMHx: Significant for CAD, Asthma, CHF, Stroke  PSHx: Significant for pacemaker placement  Social Hx: -tobacco, -EtOH, -Illicit Drugs     There are no other complaints, changes, or physical findings at this time. Current Facility-Administered Medications   Medication Dose Route Frequency Provider Last Rate Last Dose    ketorolac (TORADOL) injection 15 mg  15 mg IntraVENous NOW Fabiola Whaley MD        fentaNYL citrate (PF) injection 50 mcg  50 mcg IntraVENous NOW Fabiola Whaley MD         Current Outpatient Prescriptions   Medication Sig Dispense Refill    torsemide (DEMADEX) 10 mg tablet Take 10 mg by mouth daily.  ketorolac (TORADOL) 10 mg tablet Take 1 Tab by mouth every six (6) hours as needed for Pain.  20 Tab 0    ondansetron (ZOFRAN ODT) 4 mg disintegrating tablet Take 1 Tab by mouth every eight (8) hours as needed for Nausea. 10 Tab 0    tamsulosin (FLOMAX) 0.4 mg capsule Take 1 Cap by mouth daily. 5 Cap 0    LORazepam (ATIVAN) 0.5 mg tablet Take 1 Tab by mouth nightly as needed for Anxiety. Max Daily Amount: 0.5 mg. Indications: anxiety 30 Tab 2    busPIRone (BUSPAR) 10 mg tablet Take 1 Tab by mouth three (3) times daily. Indications: Generalized Anxiety Disorder 90 Tab 2    sertraline (ZOLOFT) 100 mg tablet Take 1 Tab by mouth daily. Indications: Generalized Anxiety Disorder, major depressive disorder, Panic Disorder 30 Tab 2    metoprolol succinate (TOPROL-XL) 50 mg XL tablet Take 1 Tab by mouth daily. 90 Tab 0    spironolactone (ALDACTONE) 25 mg tablet Take 1 Tab by mouth daily. 30 Tab 1    colchicine 0.6 mg tablet Take 1 Tab by mouth two (2) times a day. 30 Tab 0    HYDROcodone-acetaminophen (NORCO) 5-325 mg per tablet Take 1-2 Tabs by mouth every six (6) hours as needed for Pain. Max Daily Amount: 8 Tabs. 12 Tab 0    lisinopril (PRINIVIL, ZESTRIL) 20 mg tablet Take 20 mg by mouth daily. 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)     Transposition great arteries        Past Surgical History:  Past Surgical History:   Procedure Laterality Date    HX PACEMAKER         Family History:  History reviewed. No pertinent family history. Social History:  Social History   Substance Use Topics    Smoking status: Never Smoker    Smokeless tobacco: Never Used      Comment: advised not to start    Alcohol use No       Allergies: Allergies   Allergen Reactions    Betadine [Povidone-Iodine] Rash    Contrast Agent [Iodine] Itching     Need to pre-medicate with benadryl         Review of Systems   Review of Systems   Constitutional: Negative for chills and fever. HENT: Negative. Eyes: Negative. Respiratory: Negative for cough, chest tightness and shortness of breath. Cardiovascular: Positive for chest pain. Negative for leg swelling. Gastrointestinal: Positive for nausea and vomiting. Negative for abdominal pain and diarrhea. Endocrine: Negative. Genitourinary: Positive for dysuria, flank pain and hematuria. Negative for difficulty urinating. Musculoskeletal: Negative for myalgias. Skin: Negative. Neurological: Negative. Psychiatric/Behavioral: Negative. All other systems reviewed and are negative. Physical Exam   Physical Exam   Constitutional: He is oriented to person, place, and time. He appears well-developed and well-nourished. No distress. HENT:   Head: Normocephalic and atraumatic. Nose: Nose normal.   Mouth/Throat: No oropharyngeal exudate. Eyes: Conjunctivae and EOM are normal. Pupils are equal, round, and reactive to light. Neck: Normal range of motion. Neck supple. No JVD present. Cardiovascular: Normal rate, regular rhythm, normal heart sounds and intact distal pulses. Exam reveals no friction rub. No murmur heard. Pulmonary/Chest: Effort normal and breath sounds normal. No stridor. No respiratory distress. He has no wheezes. He has no rales. Abdominal: Soft. Bowel sounds are normal. He exhibits no distension. There is tenderness in the left lower quadrant. There is CVA tenderness (L). There is no rigidity, no rebound and no guarding. Musculoskeletal: Normal range of motion. He exhibits no tenderness. Neurological: He is alert and oriented to person, place, and time. No cranial nerve deficit. Skin: Skin is warm and dry. No rash noted. He is not diaphoretic. Psychiatric: He has a normal mood and affect. His speech is normal and behavior is normal. Judgment and thought content normal. Cognition and memory are normal.   Nursing note and vitals reviewed.         Diagnostic Study Results     Labs -     Recent Results (from the past 12 hour(s))   EKG, 12 LEAD, INITIAL    Collection Time: 08/06/18  3:48 PM   Result Value Ref Range    Ventricular Rate 84 BPM    Atrial Rate 84 BPM    P-R Interval 218 ms    QRS Duration 118 ms    Q-T Interval 398 ms    QTC Calculation (Bezet) 470 ms    Calculated P Axis 113 degrees    Calculated R Axis -177 degrees    Calculated T Axis 35 degrees    Diagnosis       ** Suspect arm lead reversal, interpretation assumes no reversal  Atrial-paced rhythm with prolonged AV conduction  Pulmonary disease pattern  Right bundle branch block , plus right ventricular hypertrophy  Possible Inferior infarct (cited on or before 19-MAY-2018)  When compared with ECG of 20-MAY-2018 17:04,  Nonspecific T wave abnormality has replaced inverted T waves in Inferior   leads     CBC WITH AUTOMATED DIFF    Collection Time: 08/06/18  4:37 PM   Result Value Ref Range    WBC 7.8 4.1 - 11.1 K/uL    RBC 5.28 4.10 - 5.70 M/uL    HGB 16.5 12.1 - 17.0 g/dL    HCT 46.8 36.6 - 50.3 %    MCV 88.6 80.0 - 99.0 FL    MCH 31.3 26.0 - 34.0 PG    MCHC 35.3 30.0 - 36.5 g/dL    RDW 13.1 11.5 - 14.5 %    PLATELET 622 444 - 642 K/uL    MPV 11.4 8.9 - 12.9 FL    NRBC 0.0 0  WBC    ABSOLUTE NRBC 0.00 0.00 - 0.01 K/uL    NEUTROPHILS 71 32 - 75 %    LYMPHOCYTES 19 12 - 49 %    MONOCYTES 8 5 - 13 %    EOSINOPHILS 2 0 - 7 %    BASOPHILS 1 0 - 1 %    IMMATURE GRANULOCYTES 0 0.0 - 0.5 %    ABS. NEUTROPHILS 5.5 1.8 - 8.0 K/UL    ABS. LYMPHOCYTES 1.5 0.8 - 3.5 K/UL    ABS. MONOCYTES 0.6 0.0 - 1.0 K/UL    ABS. EOSINOPHILS 0.1 0.0 - 0.4 K/UL    ABS. BASOPHILS 0.1 0.0 - 0.1 K/UL    ABS. IMM.  GRANS. 0.0 0.00 - 0.04 K/UL    DF AUTOMATED     METABOLIC PANEL, COMPREHENSIVE    Collection Time: 08/06/18  4:37 PM   Result Value Ref Range    Sodium 136 136 - 145 mmol/L    Potassium 3.8 3.5 - 5.1 mmol/L    Chloride 101 97 - 108 mmol/L    CO2 29 21 - 32 mmol/L    Anion gap 6 5 - 15 mmol/L    Glucose 84 65 - 100 mg/dL    BUN 22 (H) 6 - 20 MG/DL    Creatinine 1.42 (H) 0.70 - 1.30 MG/DL    BUN/Creatinine ratio 15 12 - 20      GFR est AA >60 >60 ml/min/1.73m2    GFR est non-AA 58 (L) >60 ml/min/1.73m2    Calcium 9.2 8.5 - 10.1 MG/DL    Bilirubin, total 1.1 (H) 0.2 - 1.0 MG/DL    ALT (SGPT) 34 12 - 78 U/L    AST (SGOT) 21 15 - 37 U/L    Alk. phosphatase 86 45 - 117 U/L    Protein, total 8.6 (H) 6.4 - 8.2 g/dL    Albumin 4.8 3.5 - 5.0 g/dL    Globulin 3.8 2.0 - 4.0 g/dL    A-G Ratio 1.3 1.1 - 2.2     CK W/ REFLX CKMB    Collection Time: 08/06/18  4:37 PM   Result Value Ref Range    CK 91 39 - 308 U/L   TROPONIN I    Collection Time: 08/06/18  4:37 PM   Result Value Ref Range    Troponin-I, Qt. <0.05 <0.05 ng/mL   D DIMER    Collection Time: 08/06/18  9:21 PM   Result Value Ref Range    D-dimer 0.22 0.00 - 0.65 mg/L FEU   URINALYSIS W/ REFLEX CULTURE    Collection Time: 08/06/18 10:12 PM   Result Value Ref Range    Color YELLOW/STRAW      Appearance CLEAR CLEAR      Specific gravity 1.014 1.003 - 1.030      pH (UA) 5.0 5.0 - 8.0      Protein NEGATIVE  NEG mg/dL    Glucose NEGATIVE  NEG mg/dL    Ketone NEGATIVE  NEG mg/dL    Bilirubin NEGATIVE  NEG      Blood NEGATIVE  NEG      Urobilinogen 0.2 0.2 - 1.0 EU/dL    Nitrites NEGATIVE  NEG      Leukocyte Esterase NEGATIVE  NEG      WBC 0-4 0 - 4 /hpf    RBC 0-5 0 - 5 /hpf    Epithelial cells FEW FEW /lpf    Bacteria NEGATIVE  NEG /hpf    UA:UC IF INDICATED CULTURE NOT INDICATED BY UA RESULT CNI      Hyaline cast 0-2 0 - 5 /lpf       Radiologic Studies -   CT ABD PELV WO CONT   Final Result      XR CHEST PA LAT   Final Result        CT Results  (Last 48 hours)               08/06/18 2229  CT ABD PELV WO CONT Final result    Impression:  IMPRESSION:    1. Tiny left ureterovesical junction calculus, without ureterectasis or   hydronephrosis. 2. Dilated right ventricle. Narrative:  CT ABDOMEN AND PELVIS WITHOUT CONTRAST. 8/6/2018 10:29 PM        INDICATION: Left flank pain, recurrent stone disease suspected. COMPARISON: 3/30/2018, 2/18/2018. TECHNIQUE: CT of the abdomen and pelvis was performed without contrast.   Evaluation of solid organs is less sensitive without IV contrast. CT dose   reduction was achieved through use of a standardized protocol tailored for this   examination and automatic exposure control for dose modulation. FINDINGS:   Abdomen: Tiny bilateral renal calculi are nonobstructing. A tiny calculus at the   left ureterovesical junction (2-74, 602-87) does not cause ureterectasis or   hydronephrosis. The lung bases are clear. The right ventricle is enlarged   thickening of the right ventricular myocardium is better seen on 2/18/2018. The   unenhanced distal esophagus, stomach, duodenum, liver, gallbladder, pancreas,   spleen, and adrenals are normal.       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 The   unenhanced small bowel, ileocecal junction, appendix, colon, and bladder are   otherwise normal.               CXR Results  (Last 48 hours)               08/06/18 1651  XR CHEST PA LAT Final result    Impression:  IMPRESSION:       No acute process. No change. Narrative:  EXAM:  XR CHEST PA LAT       INDICATION:  Chest pain last night. Shortness of breath today. Heart failure,   arrhythmia, asthma, and coronary artery disease. COMPARISON: Chest views on 5/20/2018       TECHNIQUE: PA and lateral chest views       FINDINGS: Right chest wall battery pack is unchanged. Single lead extends into   the coronary sinus, unchanged. The cardiomediastinal and hilar contours are   within normal limits. The pulmonary vasculature is within normal limits. The lungs and pleural spaces are clear. The visualized bones and upper abdomen   are age-appropriate. Medical Decision Making   I am the first provider for this patient. I reviewed the vital signs, available nursing notes, past medical history, past surgical history, family history and social history.     Vital Signs-Reviewed the patient's vital signs. Patient Vitals for the past 12 hrs:   Temp Pulse Resp BP SpO2   08/06/18 2144 97.7 °F (36.5 °C) 84 18 117/83 100 %   08/06/18 1541 97.7 °F (36.5 °C) 86 20 (!) 127/91 97 %       Pulse Oximetry Analysis - 100% on RA    Cardiac Monitor:   Rate: 86 bpm  Rhythm: Normal Sinus Rhythm      Records Reviewed: Nursing Notes, Old Medical Records, Previous electrocardiograms, Previous Radiology Studies and Previous Laboratory Studies    Provider Notes (Medical Decision Making):     DDX:  Acs, pe, stone, uti, pyelo    Plan:  Labs, ce's, ekg, d dimer, analgesic, ua, ct w/o    Impression:  Ureteral stone    ED Course:   Initial assessment performed. The patients presenting problems have been discussed, and they are in agreement with the care plan formulated and outlined with them. I have encouraged them to ask questions as they arise throughout their visit. I reviewed our electronic medical record system for any past medical records that were available that may contribute to the patients current condition, the nursing notes and and vital signs from today's visit    Nursing notes will be reviewed as they become available in realtime while the pt has been in the ED. Fernandez Osei MD      EKG interpretation 0169: atrial paced rhythm with RBBB, R Axis, rate 84; , , QTc 470; T wave inversion in V1-4, pt with h/o transposition great vessels, possible acute ischemia; Fernandez Osei MD    I personally reviewed pt's imaging. Official read by radiology listed above. Fernandez Osei MD    PROGRESS NOTE:  11:08 PM  Pt reevaluated. Pt's abdominal CT shows a tiny left UJ stone. Pt's reports feeling slightly better after Toradol. Written by Jaquelin Ponce ED Scribe, as dictated by Fernandez Osei MD      11:11 PM  Progress note:  Pt noted to be feeling better, ready for discharge. Discussed lab and imaging findings with pt and/or family, specifically noting left UJ stone.  Pt will follow up as instructed. All questions have been answered, pt voiced understanding and agreement with plan. If narcotics were prescribed, pt was advised not to drive or operate heavy machinery. If abx were prescribed, pt advised that diarrhea and rash are possible side effects of the medications. Specific return precautions provided in addition to instructions for pt to return to the ED immediately should sx worsen at any time. Mendel Branch, MD      Critical Care Time:     none    PLAN:  1. Current Discharge Medication List      START taking these medications    Details   ketorolac (TORADOL) 10 mg tablet Take 1 Tab by mouth every six (6) hours as needed for Pain. Qty: 20 Tab, Refills: 0      ondansetron (ZOFRAN ODT) 4 mg disintegrating tablet Take 1 Tab by mouth every eight (8) hours as needed for Nausea. Qty: 10 Tab, Refills: 0      tamsulosin (FLOMAX) 0.4 mg capsule Take 1 Cap by mouth daily. Qty: 5 Cap, Refills: 0           2. Follow-up Information     Follow up With Details Comments [address], NP In 2 days  [address] [place] [address] [phone]      [facility] Schedule an appointment as soon as possible for a visit If symptoms worsen, As needed [address] [facility] [address]          Return to ED if worse     Disposition:    11:10 PM   The patient's results have been reviewed with family and/or caregiver. They verbally convey their understanding and agreement of the patient's signs, symptoms, diagnosis, treatment and prognosis and additionally agree to follow up as recommended in the discharge instructions or to return to the Emergency Room should the patient's condition change prior to their follow-up appointment. The family and/or caregiver verbally agrees with the care-plan and all of their questions have been answered.  The discharge instructions have also been provided to the them with educational information regarding the patient's diagnosis as well a list of reasons why the patient would want to return to the ER prior to their follow-up appointment should their condition change. Jose Preciado MD      Diagnosis     Clinical Impression:   1. Ureteral colic    2. Acute left flank pain    3. Kidney stone    4. Chest pain of uncertain etiology        Attestations: This note is prepared by Tiffanie Mcrae, acting as Scribe for MD Jose Henao MD : The scribe's documentation has been prepared under my direction and personally reviewed by me in its entirety. I confirm that the note above accurately reflects all work, treatment, procedures, and medical decision making performed by me. This note will not be viewable in [address]

## 2018-08-07 NOTE — ED NOTES
Pt back from CT. Pt c/o 10/10 pain due to being moved around. Pt given 15mg of Toradol prior to CT scan.

## 2018-08-07 NOTE — DISCHARGE INSTRUCTIONS
Kidney Stone: Care Instructions  Your Care Instructions    Kidney stones are formed when salts, minerals, and other substances normally found in the urine clump together. They can be as small as grains of sand or, rarely, as large as golf balls. While the stone is traveling through the ureter, which is the tube that carries urine from the kidney to the bladder, you will probably feel pain. The pain may be mild or very severe. You may also have some blood in your urine. As soon as the stone reaches the bladder, any intense pain should go away. If a stone is too large to pass on its own, you may need a medical procedure to help you pass the stone. The doctor has checked you carefully, but problems can develop later. If you notice any problems or new symptoms, get medical treatment right away. Follow-up care is a key part of your treatment and safety. Be sure to make and go to all appointments, and call your doctor if you are having problems. It's also a good idea to know your test results and keep a list of the medicines you take. How can you care for yourself at home? · Drink plenty of fluids, enough so that your urine is light yellow or clear like water. If you have kidney, heart, or liver disease and have to limit fluids, talk with your doctor before you increase the amount of fluids you drink. · Take pain medicines exactly as directed. Call your doctor if you think you are having a problem with your medicine. ¨ If the doctor gave you a prescription medicine for pain, take it as prescribed. ¨ If you are not taking a prescription pain medicine, ask your doctor if you can take an over-the-counter medicine. Read and follow all instructions on the label. · Your doctor may ask you to strain your urine so that you can collect your kidney stone when it passes. You can use a kitchen strainer or a tea strainer to catch the stone. Store it in a plastic bag until you see your doctor again.   Preventing future kidney stones  Some changes in your diet may help prevent kidney stones. Depending on the cause of your stones, your doctor may recommend that you:  · Drink plenty of fluids, enough so that your urine is light yellow or clear like water. If you have kidney, heart, or liver disease and have to limit fluids, talk with your doctor before you increase the amount of fluids you drink. · Limit coffee, tea, and alcohol. Also avoid grapefruit juice. · Do not take more than the recommended daily dose of vitamins C and D.  · Avoid antacids such as Gaviscon, Maalox, Mylanta, or Tums. · Limit the amount of salt (sodium) in your diet. · Eat a balanced diet that is not too high in protein. · Limit foods that are high in a substance called oxalate, which can cause kidney stones. These foods include dark green vegetables, rhubarb, chocolate, wheat bran, nuts, cranberries, and beans. When should you call for help? Call your doctor now or seek immediate medical care if:    · You cannot keep down fluids.     · Your pain gets worse.     · You have a fever or chills.     · You have new or worse pain in your back just below your rib cage (the flank area).     · You have new or more blood in your urine.    Watch closely for changes in your health, and be sure to contact your doctor if:    · You do not get better as expected. Where can you learn more? Go to http://mj-niels.info/. Enter P339 in the search box to learn more about \"Kidney Stone: Care Instructions. \"  Current as of: May 12, 2017  Content Version: 11.7  © 2842-2120 [employer]. Care instructions adapted under license by [employer] (which disclaims liability or warranty for this information). If you have questions about a medical condition or this instruction, always ask your healthcare professional. [facility] any warranty or liability for your use of this information.              Chest Pain: Care Instructions  Your Care Instructions    There are many things that can cause chest pain. Some are not serious and will get better on their own in a few days. But some kinds of chest pain need more testing and treatment. Your doctor may have recommended a follow-up visit in the next 8 to 12 hours. If you are not getting better, you may need more tests or treatment. Even though your doctor has released you, you still need to watch for any problems. The doctor carefully checked you, but sometimes problems can develop later. If you have new symptoms or if your symptoms do not get better, get medical care right away. If you have worse or different chest pain or pressure that lasts more than 5 minutes or you passed out (lost consciousness), call 911 or seek other emergency help right away. A medical visit is only one step in your treatment. Even if you feel better, you still need to do what your doctor recommends, such as going to all suggested follow-up appointments and taking medicines exactly as directed. This will help you recover and help prevent future problems. How can you care for yourself at home? · Rest until you feel better. · Take your medicine exactly as prescribed. Call your doctor if you think you are having a problem with your medicine. · Do not drive after taking a prescription pain medicine. When should you call for help? Call 911 if:    · You passed out (lost consciousness).     · You have severe difficulty breathing.     · You have symptoms of a heart attack. These may include:  ¨ Chest pain or pressure, or a strange feeling in your chest.  ¨ Sweating. ¨ Shortness of breath. ¨ Nausea or vomiting. ¨ Pain, pressure, or a strange feeling in your back, neck, jaw, or upper belly or in one or both shoulders or arms. ¨ Lightheadedness or sudden weakness. ¨ A fast or irregular heartbeat. After you call 911, the  may tell you to chew 1 adult-strength or 2 to 4 low-dose aspirin.  Wait for an ambulance. Do not try to drive yourself.    Call your doctor today if:    · You have any trouble breathing.     · Your chest pain gets worse.     · You are dizzy or lightheaded, or you feel like you may faint.     · You are not getting better as expected.     · You are having new or different chest pain. Where can you learn more? Go to http://mj-niels.info/. Enter A120 in the search box to learn more about \"Chest Pain: Care Instructions. \"  Current as of: November 20, 2017  Content Version: 11.7  © 3479-9262 [employer]. Care instructions adapted under license by [employer] (which disclaims liability or warranty for this information). If you have questions about a medical condition or this instruction, always ask your healthcare professional. [address] any warranty or liability for your use of this information. Abdominal Pain: Care Instructions  Your Care Instructions    Abdominal pain has many possible causes. Some aren't serious and get better on their own in a few days. Others need more testing and treatment. If your pain continues or gets worse, you need to be rechecked and may need more tests to find out what is wrong. You may need surgery to correct the problem. Don't ignore new symptoms, such as fever, nausea and vomiting, urination problems, pain that gets worse, and dizziness. These may be signs of a more serious problem. Your doctor may have recommended a follow-up visit in the next 8 to 12 hours. If you are not getting better, you may need more tests or treatment. The doctor has checked you carefully, but problems can develop later. If you notice any problems or new symptoms, get medical treatment right away. Follow-up care is a key part of your treatment and safety. Be sure to make and go to all appointments, and call your doctor if you are having problems.  It's also a good idea to know your test results and keep a list of the medicines you take. How can you care for yourself at home? · Rest until you feel better. · To prevent dehydration, drink plenty of fluids, enough so that your urine is light yellow or clear like water. Choose water and other caffeine-free clear liquids until you feel better. If you have kidney, heart, or liver disease and have to limit fluids, talk with your doctor before you increase the amount of fluids you drink. · If your stomach is upset, eat mild foods, such as rice, dry toast or crackers, bananas, and applesauce. Try eating several small meals instead of two or three large ones. · Wait until 48 hours after all symptoms have gone away before you have spicy foods, alcohol, and drinks that contain caffeine. · Do not eat foods that are high in fat. · Avoid anti-inflammatory medicines such as aspirin, ibuprofen (Advil, Motrin), and naproxen (Aleve). These can cause stomach upset. Talk to your doctor if you take daily aspirin for another health problem. When should you call for help? Call 911 anytime you think you may need emergency care. For example, call if:    · You passed out (lost consciousness).     · You pass maroon or very bloody stools.     · You vomit blood or what looks like coffee grounds.     · You have new, severe belly pain.    Call your doctor now or seek immediate medical care if:    · Your pain gets worse, especially if it becomes focused in one area of your belly.     · You have a new or higher fever.     · Your stools are black and look like tar, or they have streaks of blood.     · You have unexpected vaginal bleeding.     · You have symptoms of a urinary tract infection. These may include:  ¨ Pain when you urinate.   ¨ Urinating more often than usual.  ¨ Blood in your urine.     · You are dizzy or lightheaded, or you feel like you may faint.    Watch closely for changes in your health, and be sure to contact your doctor if:    · You are not getting better after 1 day (28 hours). Where can you learn more? Go to http://mj-niels.info/. Enter Y978 in the search box to learn more about \"Abdominal Pain: Care Instructions. \"  Current as of: November 20, 2017  Content Version: 11.7  © 4864-2339 [employer]. Care instructions adapted under license by [employer] (which disclaims liability or warranty for this information). If you have questions about a medical condition or this instruction, always ask your healthcare professional. [address] any warranty or liability for your use of this information.

## 2018-08-07 NOTE — ED NOTES
Pt medicated per MD orders for pain prior to discharge. Pt happy with care and no other complaints at this time. Pt waiting in room for ride home.

## 2018-08-07 NOTE — ED NOTES
Pt bedside assessment completed. Pt c/o 8/10 left sided flank pain. Pt states he has a Hx of Angina and kidney stones.

## 2018-08-08 ENCOUNTER — OFFICE VISIT (OUTPATIENT)
Dept: FAMILY MEDICINE CLINIC | Age: 33
End: 2018-08-08

## 2018-08-08 VITALS
HEART RATE: 78 BPM | SYSTOLIC BLOOD PRESSURE: 118 MMHG | TEMPERATURE: 96.3 F | HEIGHT: 67 IN | BODY MASS INDEX: 24.64 KG/M2 | OXYGEN SATURATION: 93 % | WEIGHT: 157 LBS | RESPIRATION RATE: 19 BRPM | DIASTOLIC BLOOD PRESSURE: 79 MMHG

## 2018-08-08 DIAGNOSIS — G89.29 OTHER CHRONIC PAIN: ICD-10-CM

## 2018-08-08 DIAGNOSIS — R10.9 FLANK PAIN: Primary | ICD-10-CM

## 2018-08-08 RX ORDER — KETOROLAC TROMETHAMINE 30 MG/ML
60 INJECTION, SOLUTION INTRAMUSCULAR; INTRAVENOUS ONCE
Qty: 1 VIAL | Refills: 0
Start: 2018-08-08 | End: 2018-08-08

## 2018-08-08 NOTE — PROGRESS NOTES
Chief Complaint   Patient presents with    Follow-up     ER visit Dx: Kidney stone [address]  Identified pt with two pt identifiers(name and ). Chief Complaint   Patient presents with    Follow-up     ER visit Dx: Kidney stone ED [facility]       1. Have you been to the ER, urgent care clinic since your last visit?n  Hospitalized since your last visit? No    2. Have you seen or consulted any other health care providers outside of the [facility] since your last visit?n  Include any pap smears or colon screening. No    Today's provider has been notified of reason for visit, vitals and flowsheets obtained on patients.        Reviewed record In preparation for visit, huddled with provider and have obtained necessary documentation      Health Maintenance Due   Topic    Pneumococcal 19-64 Medium Risk (1 of 1 - PPSV23)    DTaP/Tdap/Td series (1 - Tdap)    Influenza Age 5 to Adult        Wt Readings from Last 3 Encounters:   18 157 lb (71.2 kg)   18 150 lb 12.7 oz (68.4 kg)   18 157 lb (71.2 kg)     Temp Readings from Last 3 Encounters:   18 96.3 °F (35.7 °C) (Oral)   18 97.7 °F (36.5 °C)   07/10/18 97.9 °F (36.6 °C) (Oral)     BP Readings from Last 3 Encounters:   18 118/79   18 101/67   18 118/77     Pulse Readings from Last 3 Encounters:   18 78   18 85   18 89     Vitals:    18 1553   BP: 118/79   Pulse: 78   Resp: 19   Temp: 96.3 °F (35.7 °C)   TempSrc: Oral   SpO2: 93%   Weight: 157 lb (71.2 kg)   Height: 5' 7\" (1.702 m)         Learning Assessment:  :     Learning Assessment 2018   PRIMARY LEARNER Patient   HIGHEST LEVEL OF EDUCATION - PRIMARY LEARNER  GRADUATED HIGH SCHOOL OR GED   BARRIERS PRIMARY LEARNER NONE   CO-LEARNER CAREGIVER No   PRIMARY LANGUAGE ENGLISH   LEARNER PREFERENCE PRIMARY VIDEOS   ANSWERED BY patient   RELATIONSHIP SELF       Depression Screening:  :     PHQ over the last two weeks 2018 Little interest or pleasure in doing things Not at all   Feeling down, depressed, irritable, or hopeless Not at all   Total Score PHQ 2 0       Fall Risk Assessment:  :     No flowsheet data found. Abuse Screening:  :     Abuse Screening Questionnaire 7/10/2018   Do you ever feel afraid of your partner? N   Are you in a relationship with someone who physically or mentally threatens you? N   Is it safe for you to go home? Y       ADL Screening:  :     ADL Assessment 8/8/2018   Feeding yourself No Help Needed   Getting from bed to chair No Help Needed   Getting dressed No Help Needed   Bathing or showering No Help Needed   Walk across the room (includes cane/walker) No Help Needed   Using the telphone No Help Needed   Taking your medications No Help Needed   Preparing meals No Help Needed   Managing money (expenses/bills) No Help Needed   Moderately strenuous housework (laundry) No Help Needed   Shopping for personal items (toiletries/medicines) No Help Needed   Shopping for groceries No Help Needed   Driving No Help Needed   Climbing a flight of stairs No Help Needed   Getting to places beyond walking distances No Help Needed       Patient tolerated Toradol 60 mg injection to his right deltoid without issue. Per patient pain level decreased to level 7-6. Medication reconciliation up to date and corrected with patient at this time.

## 2018-08-08 NOTE — PROGRESS NOTES
S: Margarito Neil is a 28 y.o. male who presents for follow up from ED for kidney stones    Assessment/Plan:   1. Flank pain  -pt went to ED 8/6/18 for chest pain and kidney stone  -Ab scan 8/6/18 - tiny calculus in left ureterovesical; given toradol 10mg and flomax on dc from ED  -declined to write opioids today; given ketorolac 60mg IM x1 for 10/10 pain  -advised to increase water intake, can use tylenol for pain and if pain persists to see urology for further mgmt    2. Drug seeking behavior  - today = 48 rx; 21 prescribers 8 ED visits from 11/2017 - 8/6/2018;  8/6/18 = UDS   +amphetamines;   3/30/18 = UDS +amphetamines, +THC  11/27/17 = UDS  +amphetamines, +THC  9/25/17 = UDS   +amphetamines, +THC, +opiates  -discussed >30min w/pt about hx of drug seeking behavior and necessity for him to get established with pain management if he needs pain medications  -advised pt CS (signed 10/2017) was broken and [facility] would no longer provide any controlled substances for him  - REFERRAL TO PAIN MANAGEMENT         HPI:  8/6/18 ED visit for chest pain/kidney stones  Ab scan: IMPRESSION:   1. Tiny left ureterovesical junction calculus, without ureterectasis or  hydronephrosis. 2. Dilated right ventricle. -UDS 8/6/18 = +amphetamines but none are prescribed; does have rx for ativan but benzos on UDS was negative).    -given toradol 10mg #20 for pain and flomax 0.4mg #5 on d/c from ED    C/o 10/10 pain today d/t kidney stones  Drinking a lot of water - 6 bottles of water   Pt states he did see urology in [place] after kidney stones in May and was given flomax, which helped     Drug seeking behavior:  - today = 48 rx; 21 prescribers 8 ED visits from 11/2017 - 8/6/2018;  8/6/18 = UDS   +amphetamines;   3/30/18 = UDS +amphetamines, +THC  11/27/17 = UDS  +amphetamines, +THC  9/25/17 = UDS   +amphetamines, +THC, +opiates  -discussed >30min w/pt about hx of drug seeking behavior and necessity for him to get established with pain management if he needs pain medications  -pt states he went to pain management but tested positive for marijuana and MD would not write meds (pt says is was at [facility]); advised he needed to re-establish and that [facility] could no longer write any controlled substances for him as he broke the CS agreement he signed 10/2107      History   Smoking Status    Never Smoker   Smokeless Tobacco    Never Used     Comment: advised not to start     History   Alcohol Use No     History   Drug Use No    Per pt, he tested positive for marijuana at pain management this past spring    Review of Systems:  - Constitutional Symptoms: no fevers, chills  - Cardiovascular: no chest pain or palpitations  - Respiratory: no cough or shortness of breath    PHQ over the last two weeks 8/8/2018   Little interest or pleasure in doing things Not at all   Feeling down, depressed, irritable, or hopeless Not at all   Total Score PHQ 2 0       I reviewed the following:  Past Medical History:   Diagnosis Date    Asthma     CAD (coronary artery disease)     Gout     Heart failure ([facility]     with pacemaker, 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  ketorolac (TORADOL) 10 mg tablet Take 1 Tab by mouth every six (6) hours as needed for Pain. 20 Tab 0    ondansetron (ZOFRAN ODT) 4 mg disintegrating tablet Take 1 Tab by mouth every eight (8) hours as needed for Nausea. 10 Tab 0    tamsulosin (FLOMAX) 0.4 mg capsule Take 1 Cap by mouth daily. 5 Cap 0    LORazepam (ATIVAN) 0.5 mg tablet Take 1 Tab by mouth nightly as needed for Anxiety. Max Daily Amount: 0.5 mg. Indications: anxiety 30 Tab 2    busPIRone (BUSPAR) 10 mg tablet Take 1 Tab by mouth three (3) times daily.  Indications: Generalized Anxiety Disorder 90 Tab 2    sertraline (ZOLOFT) 100 mg tablet Take 1 Tab by mouth daily. Indications: Generalized Anxiety Disorder, major depressive disorder, Panic Disorder 30 Tab 2    colchicine 0.6 mg tablet Take 1 Tab by mouth two (2) times a day. 30 Tab 0    HYDROcodone-acetaminophen (NORCO) 5-325 mg per tablet Take 1-2 Tabs by mouth every six (6) hours as needed for Pain. Max Daily Amount: 8 Tabs. 12 Tab 0    metoprolol succinate (TOPROL-XL) 50 mg XL tablet Take 1 Tab by mouth daily. 90 Tab 0    spironolactone (ALDACTONE) 25 mg tablet Take 1 Tab by mouth daily. 30 Tab 1    lisinopril (PRINIVIL, ZESTRIL) 20 mg tablet Take 20 mg by mouth daily. Allergies   Allergen Reactions    Betadine [Povidone-Iodine] Rash    Contrast Agent [Iodine] Itching     Need to pre-medicate with benadryl        O: VS:   Visit Vitals    /79 (BP 1 Location: Left arm, BP Patient Position: Sitting)    Pulse 78    Temp 96.3 °F (35.7 °C) (Oral)    Resp 19    Ht 5' 7\" (1.702 m)    Wt 157 lb (71.2 kg)    SpO2 93%    BMI 24.59 kg/m2       GENERAL: Sherry Arriaga is rocking in his chair, making sobbing noises, no tears are evident. Well developed. Appropriately groomed. HEAD:  Normocephalic. Atraumatic. Non tender sinuses x 4. PSYCH: appropriate behavior, dress. [place] eye contact. Clear and coherent speech.  ___________________________________________________________________  I spent >25 minutes face to face with patient with >50% of time spent in counseling and coordinating care. Patient education was done. Advised on nutrition, physical activity, substance abuse, and safety. Medication risks/benefits, interactions and alternatives discussed with patient.      Patient verbalized understanding, but denies SA behavior. Patient was given an after visit summary which included current diagnoses, medications and vital signs.

## 2018-08-08 NOTE — MR AVS SNAPSHOT
[address] 
[address] [place] [address] 
[phone] Patient: Laurita Cutler MRN: [id number] ECY:[id number]/60/[id number] Visit Information Date & Time Provider Department Dept. Phone Encounter #  
 8/8/2018  4:00 PM Jeane Wakefield NP [facility] [phone] [id number] Follow-up Instructions Return if symptoms worsen or fail to improve. Upcoming Health Maintenance Date Due Pneumococcal 19-64 Medium Risk (1 of 1 - PPSV23) 11/10/2004 DTaP/Tdap/Td series (1 - Tdap) 11/10/2006 Influenza Age 5 to Adult 8/1/2018 Allergies as of 8/8/2018  Review Complete On: 8/8/2018 By: Teo Ross LPN Severity Noted Reaction Type Reactions Betadine [Povidone-iodine]  03/19/2014    Rash Contrast Agent [Iodine]  03/30/2018    Itching Need to pre-medicate with benadryl Current Immunizations  Never Reviewed No immunizations on file. Not reviewed this visit You Were Diagnosed With   
  
 Codes Comments Flank pain    -  Primary ICD-10-CM: R10.9 ICD-9-CM: 789.09 Other chronic pain     ICD-10-CM: G89.29 ICD-9-CM: 338.29 Vitals BP Pulse Temp Resp Height(growth percentile) Weight(growth percentile) 118/79 (BP 1 Location: Left arm, BP Patient Position: Sitting) 78 96.3 °F (35.7 °C) (Oral) 19 5' 7\" (1.702 m) 157 lb (71.2 kg) SpO2 BMI Smoking Status 93% 24.59 kg/m2 Never Smoker BMI and BSA Data Body Mass Index Body Surface Area 24.59 kg/m 2 1.83 m 2 Preferred Pharmacy Pharmacy Name Phone [phone] [phone] Your Updated Medication List  
  
   
This list is accurate as of 8/8/18  4:37 PM.  Always use your most recent med list.  
  
  
  
  
 busPIRone 10 mg tablet Commonly known as:  BUSPAR  
 Take 1 Tab by mouth three (3) times daily. Indications: Generalized Anxiety Disorder  
  
 colchicine 0.6 mg tablet Take 1 Tab by mouth two (2) times a day. HYDROcodone-acetaminophen 5-325 mg per tablet Commonly known as:  Benetta Pock Take 1-2 Tabs by mouth every six (6) hours as needed for Pain. Max Daily Amount: 8 Tabs.  
  
 ketorolac tromethamine 60 mg/2 mL Soln Commonly known as:  TORADOL  
2 mL by IntraMUSCular route once for 1 dose. lisinopril 20 mg tablet Commonly known as:  Sneha Needy Take 20 mg by mouth daily. LORazepam 0.5 mg tablet Commonly known as:  ATIVAN Take 1 Tab by mouth nightly as needed for Anxiety. Max Daily Amount: 0.5 mg. Indications: anxiety  
  
 metoprolol succinate 50 mg XL tablet Commonly known as:  TOPROL-XL Take 1 Tab by mouth daily. ondansetron 4 mg disintegrating tablet Commonly known as:  ZOFRAN ODT Take 1 Tab by mouth every eight (8) hours as needed for Nausea. sertraline 100 mg tablet Commonly known as:  ZOLOFT Take 1 Tab by mouth daily. Indications: Generalized Anxiety Disorder, major depressive disorder, Panic Disorder  
  
 spironolactone 25 mg tablet Commonly known as:  ALDACTONE Take 1 Tab by mouth daily. tamsulosin 0.4 mg capsule Commonly known as:  FLOMAX Take 1 Cap by mouth daily. torsemide 10 mg tablet Commonly known as:  DEMADEX Take 10 mg by mouth daily. We Performed the Following KETOROLAC TROMETHAMINE INJ [ [facility]] AK THER/PROPH/DIAG INJECTION, SUBCUT/IM [id number] CPT(R)] REFERRAL TO PAIN MANAGEMENT [IDG086 Custom] Follow-up Instructions Return if symptoms worsen or fail to improve. Referral Information Referral ID Referred By Referred To  
  
 [id number] Bernice Alvarado MD   
   [place] [address] [address] [facility], [address] [facility] Phone: [phone] Fax: [phone] Visits Status Start Date End Date 1 New Request 8/8/18 8/8/19 If your referral has a status of pending review or denied, additional information will be sent to support the outcome of this decision. Patient Instructions 1) Toradol 60mg IM injection for pain today. 2) [facility] will not write controlled substances for you. Please establish with pain management. I have put another referral in for you. 3) Please establish with cardiology in [place], as it would be best to have a local cardiologist as well as your cardiologist at [facility]. 4) Your abdominal scan at the ED on 8/6/18showed 1. Tiny left ureterovesical junction calculus, without ureterectasis or 
hydronephrosis. 2. Dilated right ventricle. You will need to drink at least 80oz of water to help pass kidney stones. You stated you have flomax from the ED. Please take this medication as it can help you pass the any stones. Kidney Stone: Care Instructions Your Care Instructions Kidney stones are formed when salts, minerals, and other substances normally found in the urine clump together. They can be as small as grains of sand or, rarely, as large as golf balls. While the stone is traveling through the ureter, which is the tube that carries urine from the kidney to the bladder, you will probably feel pain. The pain may be mild or very severe. You may also have some blood in your urine. As soon as the stone reaches the bladder, any intense pain should go away. If a stone is too large to pass on its own, you may need a medical procedure to help you pass the stone. The doctor has checked you carefully, but problems can develop later. If you notice any problems or new symptoms, get medical treatment right away. Follow-up care is a key part of your treatment and safety.  Be sure to make and go to all appointments, and call your doctor if you are having problems. It's also a good idea to know your test results and keep a list of the medicines you take. How can you care for yourself at home? · Drink plenty of fluids, enough so that your urine is light yellow or clear like water. If you have kidney, heart, or liver disease and have to limit fluids, talk with your doctor before you increase the amount of fluids you drink. · Take pain medicines exactly as directed. Call your doctor if you think you are having a problem with your medicine. ¨ If the doctor gave you a prescription medicine for pain, take it as prescribed. ¨ If you are not taking a prescription pain medicine, ask your doctor if you can take an over-the-counter medicine. Read and follow all instructions on the label. · Your doctor may ask you to strain your urine so that you can collect your kidney stone when it passes. You can use a kitchen strainer or a tea strainer to catch the stone. Store it in a plastic bag until you see your doctor again. Preventing future kidney stones Some changes in your diet may help prevent kidney stones. Depending on the cause of your stones, your doctor may recommend that you: · Drink plenty of fluids, enough so that your urine is light yellow or clear like water. If you have kidney, heart, or liver disease and have to limit fluids, talk with your doctor before you increase the amount of fluids you drink. · Limit coffee, tea, and alcohol. Also avoid grapefruit juice. · Do not take more than the recommended daily dose of vitamins C and D. 
· Avoid antacids such as Gaviscon, Maalox, Mylanta, or Tums. · Limit the amount of salt (sodium) in your diet. · Eat a balanced diet that is not too high in protein. · Limit foods that are high in a substance called oxalate, which can cause kidney stones. These foods include dark green vegetables, rhubarb, chocolate, wheat bran, nuts, cranberries, and beans. When should you call for help? Call your doctor now or seek immediate medical care if: 
  · You cannot keep down fluids.  
  · Your pain gets worse.  
  · You have a fever or chills.  
  · You have new or worse pain in your back just below your rib cage (the flank area).  
  · You have new or more blood in your urine.  
 Watch closely for changes in your health, and be sure to contact your doctor if: 
  · You do not get better as expected. Where can you learn more? Go to http://mj-niels.info/. Enter B798 in the search box to learn more about \"Kidney Stone: Care Instructions. \" Current as of: May 12, 2017 Content Version: 11.7 © 1272-0495 Shopparity. Care instructions adapted under license by [employer] (which disclaims liability or warranty for this information). If you have questions about a medical condition or this instruction, always ask your healthcare professional. [address] any warranty or liability for your use of this information. Introducing [facility]! [facility] introduces Campalyst patient portal. Now you can access parts of your medical record, email your doctor's office, and request medication refills online. 1. In your internet browser, go to [email or web address] 2. Click on the First Time User? Click Here link in the Sign In box. You will see the New Member Sign Up page. 3. Enter your Campalyst Access Code exactly as it appears below. You will not need to use this code after youve completed the sign-up process. If you do not sign up before the expiration date, you must request a new code. · Campalyst Access Code: [email or web address] Expires: 8/17/2018  8:55 PM 
 
4. Enter the last four digits of your Social Security Number (xxxx) and Date of Birth (mm/dd/yyyy) as indicated and click Submit. You will be taken to the next sign-up page. 5. Create a Campalyst ID.  This will be your Campalyst login ID and cannot be changed, so think of one that is secure and easy to remember. 6. Create a [place] password. You can change your password at any time. 7. Enter your Password Reset Question and Answer. This can be used at a later time if you forget your password. 8. Enter your e-mail address. You will receive e-mail notification when new information is available in [address] 9. Click Sign Up. You can now view and download portions of your medical record. 10. Click the Download Summary menu link to download a portable copy of your medical information. If you have questions, please visit the Frequently Asked Questions section of the Kabanchik website. Remember, Kabanchik is NOT to be used for urgent needs. For medical emergencies, dial 911. Now available from your iPhone and Android! Please provide this summary of care documentation to your next provider. Your primary care clinician is listed as Alma Ayers. If you have any questions after today's visit, please call [phone].

## 2018-08-08 NOTE — PATIENT INSTRUCTIONS
1) Toradol 60mg IM injection for pain today. 2) [facility] will not write controlled substances for you. Please establish with pain management. I have put another referral in for you. 3) Please establish with cardiology in [place], as it would be best to have a local cardiologist as well as your cardiologist at [facility]. 4) Your abdominal scan at the ED on 8/6/18showed   1. Tiny left ureterovesical junction calculus, without ureterectasis or  hydronephrosis. 2. Dilated right ventricle. You will need to drink at least 80oz of water to help pass kidney stones. You stated you have flomax from the ED. Please take this medication as it can help you pass the any stones. Kidney Stone: Care Instructions  Your Care Instructions    Kidney stones are formed when salts, minerals, and other substances normally found in the urine clump together. They can be as small as grains of sand or, rarely, as large as golf balls. While the stone is traveling through the ureter, which is the tube that carries urine from the kidney to the bladder, you will probably feel pain. The pain may be mild or very severe. You may also have some blood in your urine. As soon as the stone reaches the bladder, any intense pain should go away. If a stone is too large to pass on its own, you may need a medical procedure to help you pass the stone. The doctor has checked you carefully, but problems can develop later. If you notice any problems or new symptoms, get medical treatment right away. Follow-up care is a key part of your treatment and safety. Be sure to make and go to all appointments, and call your doctor if you are having problems. It's also a good idea to know your test results and keep a list of the medicines you take. How can you care for yourself at home? · Drink plenty of fluids, enough so that your urine is light yellow or clear like water.  If you have kidney, heart, or liver disease and have to limit fluids, talk with your doctor before you increase the amount of fluids you drink. · Take pain medicines exactly as directed. Call your doctor if you think you are having a problem with your medicine. ¨ If the doctor gave you a prescription medicine for pain, take it as prescribed. ¨ If you are not taking a prescription pain medicine, ask your doctor if you can take an over-the-counter medicine. Read and follow all instructions on the label. · Your doctor may ask you to strain your urine so that you can collect your kidney stone when it passes. You can use a kitchen strainer or a tea strainer to catch the stone. Store it in a plastic bag until you see your doctor again. Preventing future kidney stones  Some changes in your diet may help prevent kidney stones. Depending on the cause of your stones, your doctor may recommend that you:  · Drink plenty of fluids, enough so that your urine is light yellow or clear like water. If you have kidney, heart, or liver disease and have to limit fluids, talk with your doctor before you increase the amount of fluids you drink. · Limit coffee, tea, and alcohol. Also avoid grapefruit juice. · Do not take more than the recommended daily dose of vitamins C and D.  · Avoid antacids such as Gaviscon, Maalox, Mylanta, or Tums. · Limit the amount of salt (sodium) in your diet. · Eat a balanced diet that is not too high in protein. · Limit foods that are high in a substance called oxalate, which can cause kidney stones. These foods include dark green vegetables, rhubarb, chocolate, wheat bran, nuts, cranberries, and beans. When should you call for help?   Call your doctor now or seek immediate medical care if:    · You cannot keep down fluids.     · Your pain gets worse.     · You have a fever or chills.     · You have new or worse pain in your back just below your rib cage (the flank area).     · You have new or more blood in your urine.    Watch closely for changes in your health, and be sure to contact your doctor if:    · You do not get better as expected. Where can you learn more? Go to http://mj-niels.info/. Enter G510 in the search box to learn more about \"Kidney Stone: Care Instructions. \"  Current as of: May 12, 2017  Content Version: 11.7  © 7122-5732 ShopKeep POS. Care instructions adapted under license by [employer] (which disclaims liability or warranty for this information). If you have questions about a medical condition or this instruction, always ask your healthcare professional. [address] any warranty or liability for your use of this information.

## 2018-09-17 ENCOUNTER — OFFICE VISIT (OUTPATIENT)
Dept: BEHAVIORAL/MENTAL HEALTH CLINIC | Age: 33
End: 2018-09-17

## 2018-09-17 VITALS
HEART RATE: 76 BPM | BODY MASS INDEX: 25.58 KG/M2 | DIASTOLIC BLOOD PRESSURE: 78 MMHG | SYSTOLIC BLOOD PRESSURE: 125 MMHG | HEIGHT: 67 IN | WEIGHT: 163 LBS

## 2018-09-17 DIAGNOSIS — F43.23 ADJUSTMENT DISORDER WITH MIXED ANXIETY AND DEPRESSED MOOD: ICD-10-CM

## 2018-09-17 DIAGNOSIS — F41.8 ANXIETY ABOUT HEALTH: Primary | ICD-10-CM

## 2018-09-17 RX ORDER — BUSPIRONE HYDROCHLORIDE 15 MG/1
15 TABLET ORAL 3 TIMES DAILY
Qty: 90 TAB | Refills: 1 | Status: SHIPPED | OUTPATIENT
Start: 2018-09-17 | End: 2018-10-16 | Stop reason: SDUPTHER

## 2018-09-17 RX ORDER — FLUOXETINE HYDROCHLORIDE 20 MG/1
20 CAPSULE ORAL DAILY
Qty: 30 CAP | Refills: 1 | Status: SHIPPED | OUTPATIENT
Start: 2018-09-17 | End: 2020-05-28 | Stop reason: SDUPTHER

## 2018-09-17 NOTE — PROGRESS NOTES
CHIEF COMPLAINT:  Jessica Christie is a 28 y.o. male and was seen today for follow-up of psychiatric condition and psychotropic medication management. HPI:    HPI    Kelly Krause reports the following psychiatric symptoms:  depression, anxiety and ADHD. The symptoms have been present for years and are of moderate severity. The symptoms occur daily. Pt reports symptoms have remained the same with current tx plan. Pt is here today to discuss his tx plan. FAMILY/SOCIAL HX: Pt reports he got custody of his children. REVIEW OF SYSTEMS:  ROS    Psychiatric:  depression  Appetite:no change from normal   Sleep: poor with DIMS (difficulty initiating & maintaining sleep) pt reports nightmares. Neuro: Migraines, HA's, post stroke hx     Visit Vitals    /78    Pulse 76    Ht 5' 7\" (1.702 m)    Wt 73.9 kg (163 lb)    BMI 25.53 kg/m2       Side Effects:  none    MENTAL STATUS EXAM:   Sensorium  oriented to time, place and person   Relations cooperative   Appearance:  casually dressed   Motor Behavior:  within normal limits   Speech:  normal pitch and normal volume   Thought Process: goal directed   Thought Content free of delusions and free of hallucinations   Suicidal ideations no plan  and no intention   Homicidal ideations no plan  and no intention   Mood:  anxious and depressed   Affect:  depressed   Memory recent  adequate   Memory remote:  adequate   Concentration:  adequate   Abstraction:  abstract   Insight:  fair and limited   Reliability poor   Judgment:  fair and limited     MEDICAL DECISION MAKING:  Problems addressed today:    ICD-10-CM ICD-9-CM    1. Anxiety about health F41.8 300.09 DRUG SCREEN, URINE - IMMUNOASSAY 9 W/REFLEX CONFIRM   2. Adjustment disorder with mixed anxiety and depressed mood F43.23 309.28 DRUG SCREEN, URINE - IMMUNOASSAY 9 W/REFLEX CONFIRM       Assessment:   Kelly Krause is not responding to treatment.  Pt reports nightmares and increased depressive symptoms due situational stressors involving his children. Pt was seen by my colleague (6/18/2018) while I was out and was prescribed Zoloft 100 mg daily, Buspar 10 mg TID and ativan 0.5mg (with 2 refills). Pt has stopped taking Zoloft due \"feeling like I have a high tolerance and it doesn't work\". Pt reports that ativan is \"not strong enough\". Discussed that due to the provider/patient trust being broken with multiple positive UDS reports  and [facility] records sent over that provide information that patient is not on the heart transplant list, I will not be able to provide any controlled substances at this time. Pt has multiple records from other provides with drug seeking behaviors. Discussed the importance of focusing on interventions that do not involve controlled substances to treat anxiety/depression at this time. Ordered a drug screen today and discussed the potential of restarting medication if patient has multiple consecutive negative UDS reports. Discussed tx options today and today will start prozac 20mg and increase buspar 15mg TID. Pt does report that buspar has been beneficial for anxiety. Pt reports that he has an appt with pain management in October. Provided support and encouragement. SAFE-T ASSESSMENT:   Risk Factors: anxiety and depressive symptoms  Protective Factors/strengths: Children  Suicide Thoughts/Plans/Behaviors/Intent: Denies  Assessment of risk/intervention/follow up: Will f/u as needed. Plan:   1. Current Outpatient Prescriptions   Medication Sig Dispense Refill    busPIRone (BUSPAR) 15 mg tablet Take 1 Tab by mouth three (3) times daily. Indications: Generalized Anxiety Disorder 90 Tab 1    FLUoxetine (PROZAC) 20 mg capsule Take 1 Cap by mouth daily. 30 Cap 1    torsemide (DEMADEX) 10 mg tablet Take 10 mg by mouth daily.  ondansetron (ZOFRAN ODT) 4 mg disintegrating tablet Take 1 Tab by mouth every eight (8) hours as needed for Nausea.  10 Tab 0    metoprolol succinate (TOPROL-XL) 50 mg XL tablet Take 1 Tab by mouth daily. 90 Tab 0    spironolactone (ALDACTONE) 25 mg tablet Take 1 Tab by mouth daily. 30 Tab 1          medication changes made today: start Prozac 20 mg for anxiety/depression, increase Buspar to 15 mg TID anxiety/aug depression, d/c ativan. 2.  Counseling and coordination of care including instructions for treatment, risks/benefits, risk factor reduction and patient/family education. He agrees with the plan. Patient instructed to call with any side effects, questions or issues. 3.  Follow-up Disposition:  Return in about 6 weeks (around 10/29/2018).     9/17/2018  Shyla Jolly NP

## 2018-09-17 NOTE — MR AVS SNAPSHOT
[address] [place] [id number] Patient: Sultana Gaston MRN: [id number] QOM:11/50/3683 Visit Information Date & Time Provider Department Dept. Phone Encounter #  
 9/17/2018  1:00 PM [address], [phone] [id number] Upcoming Health Maintenance Date Due Pneumococcal 19-64 Medium Risk (1 of 1 - PPSV23) 11/10/2004 DTaP/Tdap/Td series (1 - Tdap) 11/10/2006 Influenza Age 5 to Adult 8/1/2018 Allergies as of 9/17/2018  Review Complete On: 9/17/2018 By: [address], NP Severity Noted Reaction Type Reactions Betadine [Povidone-iodine]  03/19/2014    Rash Contrast Agent [Iodine]  03/30/2018    Itching Need to pre-medicate with benadryl Current Immunizations  Never Reviewed No immunizations on file. Not reviewed this visit You Were Diagnosed With   
  
 Codes Comments Anxiety about health    -  Primary ICD-10-CM: F41.8 ICD-9-CM: 300.09 Adjustment disorder with mixed anxiety and depressed mood     ICD-10-CM: F43.23 
ICD-9-CM: 309.28 Vitals BP Pulse Height(growth percentile) Weight(growth percentile) BMI Smoking Status 125/78 76 5' 7\" (1.702 m) 163 lb (73.9 kg) 25.53 kg/m2 Never Smoker BMI and BSA Data Body Mass Index Body Surface Area 25.53 kg/m 2 1.87 m 2 Preferred Pharmacy Pharmacy Name Phone [phone] [phone] Your Updated Medication List  
  
   
This list is accurate as of 9/17/18  1:27 PM.  Always use your most recent med list.  
  
  
  
  
 busPIRone 15 mg tablet Commonly known as:  BUSPAR Take 1 Tab by mouth three (3) times daily. Indications: Generalized Anxiety Disorder FLUoxetine 20 mg capsule Commonly known as:  PROzac Take 1 Cap by mouth daily. metoprolol succinate 50 mg XL tablet Commonly known as:  TOPROL-XL Take 1 Tab by mouth daily. ondansetron 4 mg disintegrating tablet Commonly known as:  ZOFRAN ODT Take 1 Tab by mouth every eight (8) hours as needed for Nausea. spironolactone 25 mg tablet Commonly known as:  ALDACTONE Take 1 Tab by mouth daily. torsemide 10 mg tablet Commonly known as:  DEMADEX Take 10 mg by mouth daily. Prescriptions Sent to Pharmacy Refills  
 busPIRone (BUSPAR) 15 mg tablet 1 Sig: Take 1 Tab by mouth three (3) times daily. Indications: Generalized Anxiety Disorder Class: Normal  
 Pharmacy: [address] Ph #: [phone] Route: Oral  
 FLUoxetine (PROZAC) 20 mg capsule 1 Sig: Take 1 Cap by mouth daily. Class: Normal  
 Pharmacy: [address] Ph #: [phone] Route: Oral  
  
We Performed the Following DRUG SCREEN, URINE - IMMUNOASSAY 9 W/REFLEX CONFIRM [[id number] Custom] Introducing [facility]! [facility] introduces Swatchcloud patient portal. Now you can access parts of your medical record, email your doctor's office, and request medication refills online. 1. In your internet browser, go to [email or web address] 2. Click on the First Time User? Click Here link in the Sign In box. You will see the New Member Sign Up page. 3. Enter your Swatchcloud Access Code exactly as it appears below. You will not need to use this code after youve completed the sign-up process. If you do not sign up before the expiration date, you must request a new code. · Swatchcloud Access Code: [email or web address] Expires: 12/16/2018 12:19 PM 
 
4. Enter the last four digits of your Social Security Number (xxxx) and Date of Birth (mm/dd/yyyy) as indicated and click Submit.  You will be taken to the next sign-up page. 5. Create a [facility] ID. This will be your [facility] login ID and cannot be changed, so think of one that is secure and easy to remember. 6. Create a [facility] password. You can change your password at any time. 7. Enter your Password Reset Question and Answer. This can be used at a later time if you forget your password. 8. Enter your e-mail address. You will receive e-mail notification when new information is available in [address] 9. Click Sign Up. You can now view and download portions of your medical record. 10. Click the Download Summary menu link to download a portable copy of your medical information. If you have questions, please visit the Frequently Asked Questions section of the [employer] [facility] website. Remember, [facility] is NOT to be used for urgent needs. For medical emergencies, dial 911. Now available from your iPhone and Android! Please provide this summary of care documentation to your next provider. Your primary care clinician is listed as Davonte Davis. If you have any questions after today's visit, please call [phone].

## 2018-10-16 ENCOUNTER — TELEPHONE (OUTPATIENT)
Dept: BEHAVIORAL/MENTAL HEALTH CLINIC | Age: 33
End: 2018-10-16

## 2018-10-16 RX ORDER — BUSPIRONE HYDROCHLORIDE 30 MG/1
15 TABLET ORAL 3 TIMES DAILY
Qty: 45 TAB | Refills: 0 | Status: SHIPPED | OUTPATIENT
Start: 2018-10-16 | End: 2020-05-28

## 2018-10-16 NOTE — TELEPHONE ENCOUNTER
Pt left vm regarding buspar. [facility] said its on backorder so he is asking what alternative he can have.

## 2018-11-02 ENCOUNTER — TELEPHONE (OUTPATIENT)
Dept: BEHAVIORAL/MENTAL HEALTH CLINIC | Age: 33
End: 2018-11-02

## 2019-03-10 ENCOUNTER — APPOINTMENT (OUTPATIENT)
Dept: GENERAL RADIOLOGY | Age: 34
DRG: 069 | End: 2019-03-10
Attending: EMERGENCY MEDICINE
Payer: COMMERCIAL

## 2019-03-10 ENCOUNTER — HOSPITAL ENCOUNTER (INPATIENT)
Age: 34
LOS: 1 days | Discharge: HOME | DRG: 069 | End: 2019-03-13
Attending: EMERGENCY MEDICINE | Admitting: INTERNAL MEDICINE
Payer: COMMERCIAL

## 2019-03-10 ENCOUNTER — APPOINTMENT (OUTPATIENT)
Dept: ULTRASOUND IMAGING | Age: 34
DRG: 069 | End: 2019-03-10
Attending: FAMILY MEDICINE
Payer: COMMERCIAL

## 2019-03-10 ENCOUNTER — APPOINTMENT (OUTPATIENT)
Dept: NUCLEAR MEDICINE | Age: 34
DRG: 069 | End: 2019-03-10
Attending: EMERGENCY MEDICINE
Payer: COMMERCIAL

## 2019-03-10 ENCOUNTER — APPOINTMENT (OUTPATIENT)
Dept: CT IMAGING | Age: 34
DRG: 069 | End: 2019-03-10
Attending: EMERGENCY MEDICINE
Payer: COMMERCIAL

## 2019-03-10 DIAGNOSIS — Z87.74 H/O MUSTARD PROCEDURE: ICD-10-CM

## 2019-03-10 DIAGNOSIS — R07.9 ACUTE CHEST PAIN: ICD-10-CM

## 2019-03-10 DIAGNOSIS — G89.29 CHRONIC CHEST PAIN: ICD-10-CM

## 2019-03-10 DIAGNOSIS — I95.9 HYPOTENSION, UNSPECIFIED HYPOTENSION TYPE: ICD-10-CM

## 2019-03-10 DIAGNOSIS — Z86.73 HISTORY OF CVA (CEREBROVASCULAR ACCIDENT): ICD-10-CM

## 2019-03-10 DIAGNOSIS — R07.9 CHRONIC CHEST PAIN: ICD-10-CM

## 2019-03-10 DIAGNOSIS — G89.4 CHRONIC PAIN SYNDROME: ICD-10-CM

## 2019-03-10 DIAGNOSIS — R41.0 DISORIENTATION: ICD-10-CM

## 2019-03-10 DIAGNOSIS — Q20.3 TRANSPOSITION GREAT ARTERIES: ICD-10-CM

## 2019-03-10 DIAGNOSIS — I50.810 RIGHT VENTRICULAR FAILURE (HCC): ICD-10-CM

## 2019-03-10 DIAGNOSIS — G45.9 TIA (TRANSIENT ISCHEMIC ATTACK): Primary | ICD-10-CM

## 2019-03-10 LAB
ALBUMIN SERPL-MCNC: 3.7 G/DL (ref 3.5–5)
ALBUMIN/GLOB SERPL: 1.1 {RATIO} (ref 1.1–2.2)
ALP SERPL-CCNC: 67 U/L (ref 45–117)
ALT SERPL-CCNC: 24 U/L (ref 12–78)
ANION GAP SERPL CALC-SCNC: 8 MMOL/L (ref 5–15)
AST SERPL-CCNC: 29 U/L (ref 15–37)
BASOPHILS # BLD: 0 K/UL (ref 0–0.1)
BASOPHILS NFR BLD: 1 % (ref 0–1)
BILIRUB SERPL-MCNC: 0.3 MG/DL (ref 0.2–1)
BUN SERPL-MCNC: 39 MG/DL (ref 6–20)
BUN/CREAT SERPL: 20 (ref 12–20)
CALCIUM SERPL-MCNC: 8.1 MG/DL (ref 8.5–10.1)
CHLORIDE SERPL-SCNC: 104 MMOL/L (ref 97–108)
CK SERPL-CCNC: 279 U/L (ref 39–308)
CK SERPL-CCNC: 367 U/L (ref 39–308)
CK SERPL-CCNC: 473 U/L (ref 39–308)
CO2 SERPL-SCNC: 27 MMOL/L (ref 21–32)
COMMENT, HOLDF: NORMAL
CREAT SERPL-MCNC: 1.94 MG/DL (ref 0.7–1.3)
D DIMER PPP FEU-MCNC: 3.77 MG/L FEU (ref 0–0.65)
DIFFERENTIAL METHOD BLD: ABNORMAL
EOSINOPHIL # BLD: 0.1 K/UL (ref 0–0.4)
EOSINOPHIL NFR BLD: 2 % (ref 0–7)
ERYTHROCYTE [DISTWIDTH] IN BLOOD BY AUTOMATED COUNT: 12.8 % (ref 11.5–14.5)
GLOBULIN SER CALC-MCNC: 3.5 G/DL (ref 2–4)
GLUCOSE BLD STRIP.AUTO-MCNC: 72 MG/DL (ref 65–100)
GLUCOSE BLD STRIP.AUTO-MCNC: 88 MG/DL (ref 65–100)
GLUCOSE BLD STRIP.AUTO-MCNC: 93 MG/DL (ref 65–100)
GLUCOSE SERPL-MCNC: 131 MG/DL (ref 65–100)
HCT VFR BLD AUTO: 41.2 % (ref 36.6–50.3)
HGB BLD-MCNC: 14.1 G/DL (ref 12.1–17)
IMM GRANULOCYTES # BLD AUTO: 0 K/UL (ref 0–0.04)
IMM GRANULOCYTES NFR BLD AUTO: 0 % (ref 0–0.5)
INR PPP: 1 (ref 0.9–1.1)
LYMPHOCYTES # BLD: 1.4 K/UL (ref 0.8–3.5)
LYMPHOCYTES NFR BLD: 25 % (ref 12–49)
MCH RBC QN AUTO: 32.5 PG (ref 26–34)
MCHC RBC AUTO-ENTMCNC: 34.2 G/DL (ref 30–36.5)
MCV RBC AUTO: 94.9 FL (ref 80–99)
MONOCYTES # BLD: 0.5 K/UL (ref 0–1)
MONOCYTES NFR BLD: 9 % (ref 5–13)
NEUTS SEG # BLD: 3.5 K/UL (ref 1.8–8)
NEUTS SEG NFR BLD: 63 % (ref 32–75)
NRBC # BLD: 0 K/UL (ref 0–0.01)
NRBC BLD-RTO: 0 PER 100 WBC
PLATELET # BLD AUTO: 114 K/UL (ref 150–400)
PMV BLD AUTO: 11.6 FL (ref 8.9–12.9)
POTASSIUM SERPL-SCNC: 3.9 MMOL/L (ref 3.5–5.1)
PROT SERPL-MCNC: 7.2 G/DL (ref 6.4–8.2)
PROTHROMBIN TIME: 10 SEC (ref 9–11.1)
RBC # BLD AUTO: 4.34 M/UL (ref 4.1–5.7)
SAMPLES BEING HELD,HOLD: NORMAL
SERVICE CMNT-IMP: NORMAL
SODIUM SERPL-SCNC: 139 MMOL/L (ref 136–145)
TROPONIN I SERPL-MCNC: <0.05 NG/ML
WBC # BLD AUTO: 5.6 K/UL (ref 4.1–11.1)

## 2019-03-10 PROCEDURE — 85379 FIBRIN DEGRADATION QUANT: CPT

## 2019-03-10 PROCEDURE — 74011250636 HC RX REV CODE- 250/636: Performed by: EMERGENCY MEDICINE

## 2019-03-10 PROCEDURE — 84484 ASSAY OF TROPONIN QUANT: CPT

## 2019-03-10 PROCEDURE — 71046 X-RAY EXAM CHEST 2 VIEWS: CPT

## 2019-03-10 PROCEDURE — 85025 COMPLETE CBC W/AUTO DIFF WBC: CPT

## 2019-03-10 PROCEDURE — 99285 EMERGENCY DEPT VISIT HI MDM: CPT

## 2019-03-10 PROCEDURE — 74011250636 HC RX REV CODE- 250/636: Performed by: FAMILY MEDICINE

## 2019-03-10 PROCEDURE — A9558 XE133 XENON 10MCI: HCPCS

## 2019-03-10 PROCEDURE — 76770 US EXAM ABDO BACK WALL COMP: CPT

## 2019-03-10 PROCEDURE — 94760 N-INVAS EAR/PLS OXIMETRY 1: CPT

## 2019-03-10 PROCEDURE — 85610 PROTHROMBIN TIME: CPT

## 2019-03-10 PROCEDURE — 82962 GLUCOSE BLOOD TEST: CPT

## 2019-03-10 PROCEDURE — 74011250637 HC RX REV CODE- 250/637: Performed by: FAMILY MEDICINE

## 2019-03-10 PROCEDURE — 99218 HC RM OBSERVATION: CPT

## 2019-03-10 PROCEDURE — 80053 COMPREHEN METABOLIC PANEL: CPT

## 2019-03-10 PROCEDURE — 70450 CT HEAD/BRAIN W/O DYE: CPT

## 2019-03-10 PROCEDURE — 36415 COLL VENOUS BLD VENIPUNCTURE: CPT

## 2019-03-10 PROCEDURE — 74011250637 HC RX REV CODE- 250/637: Performed by: EMERGENCY MEDICINE

## 2019-03-10 PROCEDURE — 93005 ELECTROCARDIOGRAM TRACING: CPT

## 2019-03-10 PROCEDURE — 82550 ASSAY OF CK (CPK): CPT

## 2019-03-10 RX ORDER — SODIUM CHLORIDE 0.9 % (FLUSH) 0.9 %
5-40 SYRINGE (ML) INJECTION AS NEEDED
Status: DISCONTINUED | OUTPATIENT
Start: 2019-03-10 | End: 2019-03-13 | Stop reason: HOSPADM

## 2019-03-10 RX ORDER — NITROGLYCERIN 0.4 MG/1
0.4 TABLET SUBLINGUAL
Status: DISCONTINUED | OUTPATIENT
Start: 2019-03-10 | End: 2019-03-13 | Stop reason: HOSPADM

## 2019-03-10 RX ORDER — ONDANSETRON 2 MG/ML
4 INJECTION INTRAMUSCULAR; INTRAVENOUS
Status: DISCONTINUED | OUTPATIENT
Start: 2019-03-10 | End: 2019-03-13 | Stop reason: HOSPADM

## 2019-03-10 RX ORDER — GUAIFENESIN 100 MG/5ML
81 LIQUID (ML) ORAL DAILY
Status: DISCONTINUED | OUTPATIENT
Start: 2019-03-11 | End: 2019-03-13 | Stop reason: HOSPADM

## 2019-03-10 RX ORDER — ONDANSETRON 2 MG/ML
4 INJECTION INTRAMUSCULAR; INTRAVENOUS
Status: COMPLETED | OUTPATIENT
Start: 2019-03-10 | End: 2019-03-10

## 2019-03-10 RX ORDER — ACETAMINOPHEN 650 MG/1
650 SUPPOSITORY RECTAL
Status: DISCONTINUED | OUTPATIENT
Start: 2019-03-10 | End: 2019-03-13 | Stop reason: HOSPADM

## 2019-03-10 RX ORDER — SODIUM CHLORIDE 0.9 % (FLUSH) 0.9 %
5-40 SYRINGE (ML) INJECTION EVERY 8 HOURS
Status: DISCONTINUED | OUTPATIENT
Start: 2019-03-10 | End: 2019-03-13 | Stop reason: HOSPADM

## 2019-03-10 RX ORDER — FAMOTIDINE 20 MG/1
20 TABLET, FILM COATED ORAL EVERY 12 HOURS
Status: DISCONTINUED | OUTPATIENT
Start: 2019-03-10 | End: 2019-03-13 | Stop reason: HOSPADM

## 2019-03-10 RX ORDER — FLUOXETINE 10 MG/1
20 CAPSULE ORAL DAILY
Status: DISCONTINUED | OUTPATIENT
Start: 2019-03-11 | End: 2019-03-13 | Stop reason: HOSPADM

## 2019-03-10 RX ORDER — OXYCODONE HYDROCHLORIDE 5 MG/1
5 TABLET ORAL
Status: COMPLETED | OUTPATIENT
Start: 2019-03-10 | End: 2019-03-10

## 2019-03-10 RX ORDER — SODIUM CHLORIDE 9 MG/ML
75 INJECTION, SOLUTION INTRAVENOUS CONTINUOUS
Status: DISPENSED | OUTPATIENT
Start: 2019-03-10 | End: 2019-03-11

## 2019-03-10 RX ORDER — DIAZEPAM 5 MG/1
5 TABLET ORAL
Status: DISCONTINUED | OUTPATIENT
Start: 2019-03-10 | End: 2019-03-10

## 2019-03-10 RX ORDER — FENTANYL CITRATE 50 UG/ML
25 INJECTION, SOLUTION INTRAMUSCULAR; INTRAVENOUS
Status: DISCONTINUED | OUTPATIENT
Start: 2019-03-10 | End: 2019-03-10

## 2019-03-10 RX ORDER — HEPARIN SODIUM 5000 [USP'U]/ML
5000 INJECTION, SOLUTION INTRAVENOUS; SUBCUTANEOUS EVERY 8 HOURS
Status: DISCONTINUED | OUTPATIENT
Start: 2019-03-10 | End: 2019-03-13 | Stop reason: HOSPADM

## 2019-03-10 RX ORDER — ACETAMINOPHEN 325 MG/1
650 TABLET ORAL
Status: DISCONTINUED | OUTPATIENT
Start: 2019-03-10 | End: 2019-03-13 | Stop reason: HOSPADM

## 2019-03-10 RX ORDER — OXYCODONE HYDROCHLORIDE 5 MG/1
5 TABLET ORAL
Status: DISCONTINUED | OUTPATIENT
Start: 2019-03-10 | End: 2019-03-11

## 2019-03-10 RX ADMIN — ONDANSETRON 4 MG: 2 INJECTION INTRAMUSCULAR; INTRAVENOUS at 16:51

## 2019-03-10 RX ADMIN — ONDANSETRON 4 MG: 2 INJECTION INTRAMUSCULAR; INTRAVENOUS at 12:35

## 2019-03-10 RX ADMIN — OXYCODONE HYDROCHLORIDE 5 MG: 5 TABLET ORAL at 16:51

## 2019-03-10 RX ADMIN — OXYCODONE HYDROCHLORIDE 5 MG: 5 TABLET ORAL at 13:22

## 2019-03-10 RX ADMIN — SODIUM CHLORIDE 250 ML: 900 INJECTION, SOLUTION INTRAVENOUS at 12:41

## 2019-03-10 RX ADMIN — OXYCODONE HYDROCHLORIDE 5 MG: 5 TABLET ORAL at 19:54

## 2019-03-10 RX ADMIN — Medication 10 ML: at 20:01

## 2019-03-10 RX ADMIN — ACETAMINOPHEN 650 MG: 325 TABLET ORAL at 22:47

## 2019-03-10 RX ADMIN — SODIUM CHLORIDE 75 ML/HR: 900 INJECTION, SOLUTION INTRAVENOUS at 18:36

## 2019-03-10 RX ADMIN — HEPARIN SODIUM 5000 UNITS: 5000 INJECTION INTRAVENOUS; SUBCUTANEOUS at 18:37

## 2019-03-10 RX ADMIN — ONDANSETRON 4 MG: 2 INJECTION INTRAMUSCULAR; INTRAVENOUS at 19:53

## 2019-03-10 RX ADMIN — FAMOTIDINE 20 MG: 20 TABLET ORAL at 20:00

## 2019-03-10 RX ADMIN — SODIUM CHLORIDE 500 ML: 900 INJECTION, SOLUTION INTRAVENOUS at 15:09

## 2019-03-10 RX ADMIN — Medication 10 ML: at 18:37

## 2019-03-10 NOTE — PROGRESS NOTES
TRANSFER - IN REPORT:    Verbal report received from [address] RN(name) on Krishan Almanza  being received from ED(unit) for routine progression of care      Report consisted of patients Situation, Background, Assessment and   Recommendations(SBAR). Information from the following report(s) SBAR, Kardex, [facility] and Recent Results was reviewed with the receiving nurse. Opportunity for questions and clarification was provided. Assessment completed upon patients arrival to unit and care assumed. Writer asked RN to see if patient appropriate for this LOC due to hypotension. Stated she would contact MD.      Primary Nurse Hadley Nguyễn and Elmer Ariza, RN performed a dual skin assessment on this patient No impairment noted  Bhanu score is 20    1844 Dr. Alaina Dorman paged patient c/o 10/10 right sided chest pain radiating with nausea.  Requesting pain medication & nausea medication stating they go 'hand in hand'    1847 Cardio consult called - Dr. Maryam Sheppard on call    [facility] Dr. Maryam Sheppard returned call and stated will see patient    1910 Dr. Alaina Dorman returned call and gave order for roxicodone 5mg PO Q6H PRN & Zofran 4mg IV Q4H PRN    1915 bedside shift report given to Williams Garcia

## 2019-03-10 NOTE — PROGRESS NOTES

## 2019-03-10 NOTE — ED NOTES
TRANSFER - OUT REPORT:    Verbal report given to Mirlande PARK(name) on Kendall Ferrer  being transferred to Neurology(unit) for routine progression of care       Report consisted of patients Situation, Background, Assessment and   Recommendations(SBAR). Information from the following report(s) SBAR was reviewed with the receiving nurse. Lines:   Peripheral IV 03/10/19 Left Wrist (Active)   Site Assessment Clean, dry, & intact 3/10/2019 11:31 AM   Phlebitis Assessment 0 3/10/2019 11:31 AM   Infiltration Assessment 0 3/10/2019 11:31 AM   Dressing Status Clean, dry, & intact 3/10/2019 11:31 AM   Dressing Type Transparent 3/10/2019 11:31 AM   Hub Color/Line Status Green 3/10/2019 11:31 AM        Opportunity for questions and clarification was provided.       Patient transported with:   [address]

## 2019-03-10 NOTE — ED TRIAGE NOTES
complains of chest pain and blurred vision times three days: per pt he has a pacemaker and trasposition of the great artery and has had a stroke in the past

## 2019-03-10 NOTE — ED NOTES
MD Quintin Bryan called and notified that patient more lethargic and BP still low. Patient able to answer orientation questions correctly. Manual 90/58. Patient mother at bedside. MD to come see patient.

## 2019-03-10 NOTE — ED NOTES
Patient presents with c/o chest pain and pressure, nausea, dizziness, and double vision for last two days. Patient stated that he always has chest pain of 4/10 but that the pressure and pain has gotten worse over the last two days. Patient stated that the pain radiates into his right arm and up into his neck. Patient has hx of MI and stroke in past and currently has pacemaker. Patient is on list to have heart transplant through [facility].

## 2019-03-10 NOTE — H&P
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 Pt reports theat he wok up this morning with pain in the right side of his chest along with a HA, double vision, and some \" spasms\" and weakness of right UE and LE. Pt reports he has not experienced weakness of arms or leg ever in the past. Pt reports that he is on a cardiac transplant list and  His cardiologist is at [facility]. He reports that he was at [facility] a week ago and had some \"abnormal heart beat\". Thus, his pacemaker was interrogated and  His medications were adjusted. Pt reports that he is no longer taking Metoprolol and is on a new medication ,the name of which starts with a \"B\" but does not remember the exact name. Pt had a VQ scan in ED which was low probability for PE, Pt denies any palpations, diaphoresis, N/V, radiation of the pain or exertional component associated with the CP. Pt reports that his double vision and right arm/leg weakness has resolved now. The patient also denies any weight gain or shortness of breath with his symptoms. The patient also denies any headache, sore throat, trouble swallowing, trouble with speech, any ightheadedness, dizziness, abdominal pain, constipation, diarrhea, urinary symptoms, fever, chills,other focal generalized neurological weakness, recent travel, sick contacts or any other concerns or problems. Review of Systems:  A comprehensive review of systems was negative except for that written in the History of Present Illness. Past Medical History:   Diagnosis Date    Asthma     CAD (coronary artery disease)     Calculus of kidney     Gout     Heart failure ([facility] [address]     with pacemaker, states transition of great vessels    Pacemaker     Stroke ([facility] [address]     Syncope     Transposition great arteries       Past Surgical History:   Procedure Laterality Date    HX PACEMAKER       Prior to Admission medications    Medication Sig Start Date End Date Taking? Authorizing Provider   busPIRone (BUSPAR) 30 mg tablet Take 0.5 Tabs by mouth three (3) times daily. Indications: Generalized Anxiety Disorder 10/16/18   Val Watts NP   FLUoxetine (PROZAC) 20 mg capsule Take 1 Cap by mouth daily. 9/17/18   Val Watts NP   torsemide (DEMADEX) 10 mg tablet Take 10 mg by mouth daily. Other, MD Miranda   ondansetron (ZOFRAN ODT) 4 mg disintegrating tablet Take 1 Tab by mouth every eight (8) hours as needed for Nausea. 8/6/18   Mateo Ga MD   metoprolol succinate (TOPROL-XL) 50 mg XL tablet Take 1 Tab by mouth daily. 12/5/17   Anibal Serna NP   spironolactone (ALDACTONE) 25 mg tablet Take 1 Tab by mouth daily. 10/10/17   Anibal Serna NP     Allergies   Allergen Reactions    Betadine [Povidone-Iodine] Rash    Contrast Agent [Iodine] Itching     Need to pre-medicate with benadryl      No family history on file. SOCIAL HISTORY:  Patient resides:  Independently x   Assisted Living    SNF    With family care       Smoking history:   None x   Former    Chronic      Alcohol history:   None x   Social    Chronic      Ambulates:   Independently x   w/cane    w/walker    w/wc    CODE STATUS:  DNR    Full    Other      Objective:     Physical Exam:     Visit Vitals  BP 91/47   Pulse 85   Temp 97.8 °F (36.6 °C)   Resp 14   Ht 5' 7\" (1.702 m)   Wt 74.1 kg (163 lb 5.8 oz)   SpO2 96%   BMI 25.59 kg/m²      O2 Device: Room air    General:  Alert, cooperative, no distress, appears stated age.    Head:  Normocephalic, without obvious abnormality, atraumatic. Eyes:  Conjunctivae/corneas clear. PERRL, EOMI   Nose: Nares normal. Septum midline. Mucosa normal   Throat: Lips, mucosa, and tongue normal. Teeth normal   Neck: Supple, symmetrical, trachea midline, no adenopathy   Back:   Symmetric,ROM normal. No CVA tenderness. Lungs:   Clear to auscultation bilaterally. Chest wall:  No tenderness or deformity. Heart:  Regular rate and rhythm, S1, S2 normal   Abdomen:   Soft, non-tender. Bowel sounds normal. No masses,  No organomegaly. Extremities: Extremities normal, atraumatic, no cyanosis or edema. Pulses: 2+ and symmetric all extremities. Skin: Skin color, texture, turgor normal. No rashes or lesions   Neurologic: CNII-XII intact. Strength 5/5 x 4, DTR 1+ x 4, sensory grossly wnl          EKG:  {Atrial paved, non specific ST changes    Data Review:     Recent Days:  Recent Labs     03/10/19  1131   WBC 5.6   HGB 14.1   HCT 41.2   *     Recent Labs     03/10/19  1231 03/10/19  1131   NA  --  139   K  --  3.9   CL  --  104   CO2  --  27   GLU  --  131*   BUN  --  39*   CREA  --  1.94*   CA  --  8.1*   ALB  --  3.7   SGOT  --  29   ALT  --  24   INR 1.0  --      No results for input(s): PH, PCO2, PO2, HCO3, FIO2 in the last 72 hours.     24 Hour Results:  Recent Results (from the past 24 hour(s))   EKG, 12 LEAD, INITIAL    Collection Time: 03/10/19 11:21 AM   Result Value Ref Range    Ventricular Rate 84 BPM    Atrial Rate 84 BPM    P-R Interval 218 ms    QRS Duration 108 ms    Q-T Interval 382 ms    QTC Calculation (Bezet) 451 ms    Calculated R Axis -168 degrees    Calculated T Axis 26 degrees    Diagnosis       Atrial-paced rhythm with prolonged AV conduction  Right bundle branch block , plus right ventricular hypertrophy  Inferior infarct (cited on or before 19-MAY-2018)  T wave abnormality, consider lateral ischemia  When compared with ECG of 06-AUG-2018 15:48,  No significant change was found     CBC WITH AUTOMATED DIFF Collection Time: 03/10/19 11:31 AM   Result Value Ref Range    WBC 5.6 4.1 - 11.1 K/uL    RBC 4.34 4.10 - 5.70 M/uL    HGB 14.1 12.1 - 17.0 g/dL    HCT 41.2 36.6 - 50.3 %    MCV 94.9 80.0 - 99.0 FL    MCH 32.5 26.0 - 34.0 PG    MCHC 34.2 30.0 - 36.5 g/dL    RDW 12.8 11.5 - 14.5 %    PLATELET 007 (L) 152 - 400 K/uL    MPV 11.6 8.9 - 12.9 FL    NRBC 0.0 0  WBC    ABSOLUTE NRBC 0.00 0.00 - 0.01 K/uL    NEUTROPHILS 63 32 - 75 %    LYMPHOCYTES 25 12 - 49 %    MONOCYTES 9 5 - 13 %    EOSINOPHILS 2 0 - 7 %    BASOPHILS 1 0 - 1 %    IMMATURE GRANULOCYTES 0 0.0 - 0.5 %    ABS. NEUTROPHILS 3.5 1.8 - 8.0 K/UL    ABS. LYMPHOCYTES 1.4 0.8 - 3.5 K/UL    ABS. MONOCYTES 0.5 0.0 - 1.0 K/UL    ABS. EOSINOPHILS 0.1 0.0 - 0.4 K/UL    ABS. BASOPHILS 0.0 0.0 - 0.1 K/UL    ABS. IMM. GRANS. 0.0 0.00 - 0.04 K/UL    DF AUTOMATED     METABOLIC PANEL, COMPREHENSIVE    Collection Time: 03/10/19 11:31 AM   Result Value Ref Range    Sodium 139 136 - 145 mmol/L    Potassium 3.9 3.5 - 5.1 mmol/L    Chloride 104 97 - 108 mmol/L    CO2 27 21 - 32 mmol/L    Anion gap 8 5 - 15 mmol/L    Glucose 131 (H) 65 - 100 mg/dL    BUN 39 (H) 6 - 20 MG/DL    Creatinine 1.94 (H) 0.70 - 1.30 MG/DL    BUN/Creatinine ratio 20 12 - 20      GFR est AA 49 (L) >60 ml/min/1.73m2    GFR est non-AA 40 (L) >60 ml/min/1.73m2    Calcium 8.1 (L) 8.5 - 10.1 MG/DL    Bilirubin, total 0.3 0.2 - 1.0 MG/DL    ALT (SGPT) 24 12 - 78 U/L    AST (SGOT) 29 15 - 37 U/L    Alk.  phosphatase 67 45 - 117 U/L    Protein, total 7.2 6.4 - 8.2 g/dL    Albumin 3.7 3.5 - 5.0 g/dL    Globulin 3.5 2.0 - 4.0 g/dL    A-G Ratio 1.1 1.1 - 2.2     TROPONIN I    Collection Time: 03/10/19 11:31 AM   Result Value Ref Range    Troponin-I, Qt. <0.05 <0.05 ng/mL   CK    Collection Time: 03/10/19 11:31 AM   Result Value Ref Range     (H) 39 - 308 U/L   D DIMER    Collection Time: 03/10/19 12:31 PM   Result Value Ref Range    D-dimer 3.77 (H) 0.00 - 0.65 mg/L FEU   PROTHROMBIN TIME + INR    Collection Time: 03/10/19 12:31 PM   Result Value Ref Range    INR 1.0 0.9 - 1.1      Prothrombin time 10.0 9.0 - 11.1 sec         Imaging:     Assessment:     Active Problems:  TIA (transient ischemic attack) (3/10/2019)  Chest pain  Hypotension  Acute on Chronic Kidney Injury  HTN  H/O transposition of great vessels  H/OCVA  Chronic systolic CHF NYHA class II  S/p Pacemaker implantation             Plan:     1. TIA: admit to neuro floor, Patient with LINDA so may not be possible to obtain a CTA head/neck, Pt also with a pacemaker so MRI may not be possible, start Pt on  ASA 81 mg, neurovascular checks per protocol, PT, OT, speech consult, await neurology input, Carotid duplex, 2 D ECHO, Lipid Panel, any changes in neurological status will mandate emergent intervention,     2. Chest pain: Currently resolved, will cycle cardiac enzymes, ASA, telemetry monitoring, NTG for pain, if symptoms persist may consider further intervention and diagnostics. Will obtain Cardiology consult, monitor. Pt recently had his meds changed, he is not every sure of what he is taking , will obtain records from [facility] to restart home meds. 3. Hypotension: ? Hypovolemic, will hold diuretics and anti hypertensive for now, gentle IV hydration, no signs of sepsis, closely monitor, if persists may consider further intervention and diagnostics. 4. Acute on Chronic Kidney injury: ? Prerenal, gentle IV hydration, renal US, trend creatinine, hold lisinopril, avoid nephrotoxic meds, renally dose other meds, may consider renal consult if creatinine continues to rise, UA, monitor   5. Chronic systolic CHF NYHA class II: no signs of exacerbation, monitor, strict I/O and daily wt.     6. H/O transposition of great vessels: s/p multiple surgeries, currently being treated at [facility], on cardiac transplant list     GI/DVT: on subq Heparin   Diet: cardiac  Code status : Full  Disposition: probably home tomorrow              Signed By: Evelia Galdamez MD     March 10, 2019

## 2019-03-10 NOTE — ED PROVIDER NOTES
EMERGENCY DEPARTMENT HISTORY AND PHYSICAL EXAM      Date: 3/10/2019  Patient Name: Kendall Ferrer    History of Presenting Illness     Chief Complaint   Patient presents with    Chest Pain       History Provided By: Patient    HPI: Kendall Ferrer, 35 y.o. male with PMHx significant for asthma, CAD, heart failure, stroke, pacemaker, syncope, presents ambulatory to the ED with cc of gradual onset chest pain, onset ~2 days ago. Pt reports he has a constant pressure on his chest of 4/10, but 2 days ago it worsened to a 10/10, and has been radiating to right shoulder, neck, and back. Pt also c/o double vision, RLE, onset at 0215 pta, and RUE weakness, onset 2 days ago as well. Pt reports past stroke made his right side chronically weaker, but pt can tell that weakness is worse today. Pt reports he has been taking a new beta blocker for ~1 week. He also reports nausea/vomiting at night due to chest pain, and reports he has to recline while sleeping to have relief, and states standing/ambulating exacerbates pain. Pt reports double vision has been relieved currently. Pt also c/o intermittent HA, and denies taking any medications for relief. Pt denies hx of migraines, recent head trauma, or use of Aspirin as a blood thinner. He also reports slight myalgias around pacemaker, and reports pacemaker is ~6years old. He states he is having a gout flare up in left great toe, and reports pain is a 9/10 on pain scale. He reports all pain is exacerbated by deep breaths. Pt denies cough, numbness in RLE/RUE, or social hx of smoking or drinking. There are no other complaints, changes, or physical findings at this time. PCP: Arthur Valle NP    No current facility-administered medications on file prior to encounter. Current Outpatient Medications on File Prior to Encounter   Medication Sig Dispense Refill    busPIRone (BUSPAR) 30 mg tablet Take 0.5 Tabs by mouth three (3) times daily.  Indications: Generalized Anxiety Disorder 45 Tab 0    FLUoxetine (PROZAC) 20 mg capsule Take 1 Cap by mouth daily. 30 Cap 1    torsemide (DEMADEX) 10 mg tablet Take 10 mg by mouth daily.  ondansetron (ZOFRAN ODT) 4 mg disintegrating tablet Take 1 Tab by mouth every eight (8) hours as needed for Nausea. 10 Tab 0    metoprolol succinate (TOPROL-XL) 50 mg XL tablet Take 1 Tab by mouth daily. 90 Tab 0    spironolactone (ALDACTONE) 25 mg tablet Take 1 Tab by mouth daily. 27 Tab 1       Past History     Past Medical History:  Past Medical History:   Diagnosis Date    Asthma     CAD (coronary artery disease)     Calculus of kidney     Gout     Heart failure ([facility] [address]     with pacemaker, states transition of great vessels    Pacemaker     Stroke ([facility] [address]     Syncope     Transposition great arteries        Past Surgical History:  Past Surgical History:   Procedure Laterality Date    HX PACEMAKER         Family History:  No family history on file. Social History:  Social History     Tobacco Use    Smoking status: Never Smoker    Smokeless tobacco: Never Used    Tobacco comment: advised not to start   Substance Use Topics    Alcohol use: No    Drug use: Yes     Types: Marijuana       Allergies: Allergies   Allergen Reactions    Betadine [Povidone-Iodine] Rash    Contrast Agent [Iodine] Itching     Need to pre-medicate with benadryl         Review of Systems   Review of Systems   Constitutional: Negative for fever. HENT: Negative for congestion. Eyes: Positive for visual disturbance. Cardiovascular: Positive for chest pain. Gastrointestinal: Positive for nausea and vomiting. Endocrine: Negative for heat intolerance. Genitourinary: Negative for flank pain. Musculoskeletal: Negative for back pain. Skin: Negative for rash. Allergic/Immunologic: Negative for immunocompromised state. Neurological: Positive for weakness (RUE and RLE) and headaches. Hematological: Does not bruise/bleed easily. Psychiatric/Behavioral: Negative. All other systems reviewed and are negative. Physical Exam   Physical Exam   Constitutional: He is oriented to person, place, and time. He appears well-developed and well-nourished. No distress. Mild distress   HENT:   Head: Normocephalic and atraumatic. Eyes: EOM are normal. Pupils are equal, round, and reactive to light. Horizontal nystagmus right and left   Neck: Normal range of motion. Neck supple. Cardiovascular: Normal rate, regular rhythm and normal heart sounds. Intact distal pulses   Pulmonary/Chest: Effort normal and breath sounds normal. He has no wheezes. Pacemaker right chest wall, no reproducible chest wall tenderness   Abdominal: Soft. Bowel sounds are normal. There is no tenderness. Musculoskeletal: Normal range of motion. He exhibits no edema or tenderness. Neurological: He is alert and oriented to person, place, and time. No cranial nerve deficit. Equal , sensory intact, no pronator drift; motor symmetric in legs   Skin: Skin is warm and dry. Erythema and edema left great toe, tender medially   Psychiatric: He has a normal mood and affect. His behavior is normal.   Nursing note and vitals reviewed.       Diagnostic Study Results     Labs -     Recent Results (from the past 12 hour(s))   EKG, 12 LEAD, INITIAL    Collection Time: 03/10/19 11:21 AM   Result Value Ref Range    Ventricular Rate 84 BPM    Atrial Rate 84 BPM    P-R Interval 218 ms    QRS Duration 108 ms    Q-T Interval 382 ms    QTC Calculation (Bezet) 451 ms    Calculated R Axis -168 degrees    Calculated T Axis 26 degrees    Diagnosis       Atrial-paced rhythm with prolonged AV conduction  Right bundle branch block , plus right ventricular hypertrophy  Inferior infarct (cited on or before 19-MAY-2018)  T wave abnormality, consider lateral ischemia  When compared with ECG of 06-AUG-2018 15:48,  No significant change was found     CBC WITH AUTOMATED DIFF    Collection Time: 03/10/19 11:31 AM   Result Value Ref Range    WBC 5.6 4.1 - 11.1 K/uL    RBC 4.34 4.10 - 5.70 M/uL    HGB 14.1 12.1 - 17.0 g/dL    HCT 41.2 36.6 - 50.3 %    MCV 94.9 80.0 - 99.0 FL    MCH 32.5 26.0 - 34.0 PG    MCHC 34.2 30.0 - 36.5 g/dL    RDW 12.8 11.5 - 14.5 %    PLATELET 445 (L) 760 - 400 K/uL    MPV 11.6 8.9 - 12.9 FL    NRBC 0.0 0  WBC    ABSOLUTE NRBC 0.00 0.00 - 0.01 K/uL    NEUTROPHILS 63 32 - 75 %    LYMPHOCYTES 25 12 - 49 %    MONOCYTES 9 5 - 13 %    EOSINOPHILS 2 0 - 7 %    BASOPHILS 1 0 - 1 %    IMMATURE GRANULOCYTES 0 0.0 - 0.5 %    ABS. NEUTROPHILS 3.5 1.8 - 8.0 K/UL    ABS. LYMPHOCYTES 1.4 0.8 - 3.5 K/UL    ABS. MONOCYTES 0.5 0.0 - 1.0 K/UL    ABS. EOSINOPHILS 0.1 0.0 - 0.4 K/UL    ABS. BASOPHILS 0.0 0.0 - 0.1 K/UL    ABS. IMM. GRANS. 0.0 0.00 - 0.04 K/UL    DF AUTOMATED     METABOLIC PANEL, COMPREHENSIVE    Collection Time: 03/10/19 11:31 AM   Result Value Ref Range    Sodium 139 136 - 145 mmol/L    Potassium 3.9 3.5 - 5.1 mmol/L    Chloride 104 97 - 108 mmol/L    CO2 27 21 - 32 mmol/L    Anion gap 8 5 - 15 mmol/L    Glucose 131 (H) 65 - 100 mg/dL    BUN 39 (H) 6 - 20 MG/DL    Creatinine 1.94 (H) 0.70 - 1.30 MG/DL    BUN/Creatinine ratio 20 12 - 20      GFR est AA 49 (L) >60 ml/min/1.73m2    GFR est non-AA 40 (L) >60 ml/min/1.73m2    Calcium 8.1 (L) 8.5 - 10.1 MG/DL    Bilirubin, total 0.3 0.2 - 1.0 MG/DL    ALT (SGPT) 24 12 - 78 U/L    AST (SGOT) 29 15 - 37 U/L    Alk.  phosphatase 67 45 - 117 U/L    Protein, total 7.2 6.4 - 8.2 g/dL    Albumin 3.7 3.5 - 5.0 g/dL    Globulin 3.5 2.0 - 4.0 g/dL    A-G Ratio 1.1 1.1 - 2.2     TROPONIN I    Collection Time: 03/10/19 11:31 AM   Result Value Ref Range    Troponin-I, Qt. <0.05 <0.05 ng/mL   CK    Collection Time: 03/10/19 11:31 AM   Result Value Ref Range     (H) 39 - 308 U/L   D DIMER    Collection Time: 03/10/19 12:31 PM   Result Value Ref Range    D-dimer 3.77 (H) 0.00 - 0.65 mg/L FEU   PROTHROMBIN TIME + INR    Collection Time: 03/10/19 12:31 PM   Result Value Ref Range    INR 1.0 0.9 - 1.1      Prothrombin time 10.0 9.0 - 11.1 sec       Radiologic Studies -   NM LUNG VENT/PERF         CT HEAD WO CONT   Final Result   IMPRESSION:      Stable exam without evidence for acute intracranial abnormality                XR CHEST PA LAT   Final Result   IMPRESSION:       No acute process. CT Results  (Last 48 hours)               03/10/19 1210  CT HEAD WO CONT Final result    Impression:  IMPRESSION:       Stable exam without evidence for acute intracranial abnormality                   Narrative:  INDICATION:  Dizziness and diplopia        EXAM:  HEAD CT WITHOUT CONTRAST       COMPARISON:  9/25/2017       TECHNIQUE:  Routine noncontrast axial head CT was performed. Coronal and   sagittal multiplanar reconstructions were obtained. CT dose reduction was   achieved through use of a standardized protocol tailored for this examination   and automatic exposure control for dose modulation. FINDINGS:       The ventricles and cortical sulci are within normal limits. No evidence for acute intracranial hemorrhage, midline shift, or mass effect. Unchanged bifrontal focal hypoattenuation compatible with chronic injuries. The basal cisterns are patent. The visualized paranasal sinuses and mastoid air cells are clear. No calvarial abnormality. CXR Results  (Last 48 hours)               03/10/19 1152  XR CHEST PA LAT Final result    Impression:  IMPRESSION:        No acute process. Narrative:  EXAM:  XR CHEST PA LAT       INDICATION:  Chest Pain, pressure, nausea, and dizziness for the last 2 days,   worse recently       COMPARISON:  8/6/2018        FINDINGS:       PA and lateral radiographs of the chest demonstrate clear lungs. 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 Allowing for this, the cardiac silhouette is normal   in appearance.  There is an unchanged appearance and location of the pacer   device. There is no vascular congestion or pleural fluid. .                   Medical Decision Making   I am the first provider for this patient. I reviewed the vital signs, available nursing notes, past medical history, past surgical history, family history and social history. Vital Signs-Reviewed the patient's vital signs. Patient Vitals for the past 12 hrs:   Temp Pulse Resp BP SpO2   03/10/19 1330  85 14 91/47 96 %   03/10/19 1230  85 11 97/53 99 %   03/10/19 1200  85 16 97/58 96 %   03/10/19 1130  85 8  95 %   03/10/19 1128  85 9 101/53    03/10/19 1113 97.8 °F (36.6 °C) 86 18 101/64 98 %       Pulse Oximetry Analysis - 98% on RA    Cardiac Monitor:   Rate: 86 bpm  Rhythm:  atrial-paced rhythm with prolonged AV conduction 1113     EKG interpretation: (Preliminary) 1121  Rhythm: atrial-paced rhythm with prolonged AV conduction; and right bundle branch block, plus right ventricular hypertrophy. Rate (approx.): 84; Axis: normal; OR interval: normal; QRS interval: normal ; ST/T wave: T wave abnormality, consider lateral ischemia; Other findings: inferior infarct. Records Reviewed: Nursing Notes and Old Medical Records    Provider Notes (Medical Decision Making):   DDx: CVA, TIA, electrolyte abnormality, CAD, costochondritis, PE, pleurisy, muscular skeletal     ED Course:   Initial assessment performed. The patients presenting problems have been discussed, and they are in agreement with the care plan formulated and outlined with them. I have encouraged them to ask questions as they arise throughout their visit. Consult Note:  11:54 AM  Jamaica Hoover MD spoke with Mango Medrano MD,  Specialty: Neurology  Discussed pt's hx, disposition, and available diagnostic and imaging results. Reviewed care plans. Consultant agrees with plans as outlined. Dr. Mango Medrano agrees with admission, and states if pt does not have significant deficits, no need to have immediate CT.       Critical Care Time: CRITICAL CARE NOTE :    3:28 PM    IMPENDING DETERIORATION -Respiratory, Cardiovascular, CNS and Metabolic  ASSOCIATED RISK FACTORS - Hypotension, Dysrhythmia, Metabolic changes, Dehydration and CNS Decompensation  MANAGEMENT- Bedside Assessment and Supervision of Care  INTERPRETATION -  Xrays, CT Scan, ECG and Blood Pressure  INTERVENTIONS - hemodynamic mngmt and Metobolic interventions  CASE REVIEW - Hospitalist, Medical Sub-Specialist, Nursing and Family  TREATMENT RESPONSE -Unchanged   PERFORMED BY - Self    NOTES   :    I have spent 55 minutes of critical care time involved in lab review, consultations with specialist, family decision- making, bedside attention and documentation. During this entire length of time I was immediately available to the patient . Critical Care: The reason for providing this level of medical care for this critically ill patient was due to a critical illness that impaired one or more vital organ systems, such that there was a high probability of imminent or life threatening deterioration in the patient's condition. This care involved high complexity decision making to assess, manipulate, and support vital system functions, to treat this degree of vital organ system failure, and to prevent further life threatening deterioration of the patients condition. Hussain Linda MD    Disposition:  Admit Note:  3:29 PM  Pt is being admitted by Dr. Say Petersen. The results of their tests and reason(s) for their admission have been discussed with pt and/or available family. They convey agreement and understanding for the need to be admitted and for admission diagnosis. Diagnosis     Clinical Impression:   1. TIA (transient ischemic attack)    2. Acute chest pain    3. Transposition great arteries    4. Hypotension, unspecified hypotension type        Attestations: This note is prepared by Sebastian Fofana, acting as Scribe for MD Hussain Anderson MD: The scribe's documentation has been prepared under my direction and personally reviewed by me in its entirety. I confirm that the note above accurately reflects all work, treatment, procedures, and medical decision making performed by me.

## 2019-03-11 ENCOUNTER — APPOINTMENT (OUTPATIENT)
Dept: VASCULAR SURGERY | Age: 34
DRG: 069 | End: 2019-03-11
Attending: FAMILY MEDICINE
Payer: COMMERCIAL

## 2019-03-11 ENCOUNTER — APPOINTMENT (OUTPATIENT)
Dept: NON INVASIVE DIAGNOSTICS | Age: 34
DRG: 069 | End: 2019-03-11
Attending: FAMILY MEDICINE
Payer: COMMERCIAL

## 2019-03-11 PROBLEM — R42 DIZZINESS: Status: ACTIVE | Noted: 2019-03-11

## 2019-03-11 LAB
ATRIAL RATE: 84 BPM
CALCULATED R AXIS, ECG10: -168 DEGREES
CALCULATED T AXIS, ECG11: 26 DEGREES
CHOLEST SERPL-MCNC: 94 MG/DL
DIAGNOSIS, 93000: NORMAL
ERYTHROCYTE [DISTWIDTH] IN BLOOD BY AUTOMATED COUNT: 12.7 % (ref 11.5–14.5)
EST. AVERAGE GLUCOSE BLD GHB EST-MCNC: NORMAL MG/DL
GLUCOSE BLD STRIP.AUTO-MCNC: 104 MG/DL (ref 65–100)
GLUCOSE BLD STRIP.AUTO-MCNC: 105 MG/DL (ref 65–100)
GLUCOSE BLD STRIP.AUTO-MCNC: 84 MG/DL (ref 65–100)
GLUCOSE BLD STRIP.AUTO-MCNC: 99 MG/DL (ref 65–100)
HBA1C MFR BLD: 4.9 % (ref 4.2–6.3)
HCT VFR BLD AUTO: 37.6 % (ref 36.6–50.3)
HDLC SERPL-MCNC: 30 MG/DL
HDLC SERPL: 3.1 {RATIO} (ref 0–5)
HGB BLD-MCNC: 12.5 G/DL (ref 12.1–17)
LDLC SERPL CALC-MCNC: 29.4 MG/DL (ref 0–100)
LIPID PROFILE,FLP: ABNORMAL
MCH RBC QN AUTO: 32.2 PG (ref 26–34)
MCHC RBC AUTO-ENTMCNC: 33.2 G/DL (ref 30–36.5)
MCV RBC AUTO: 96.9 FL (ref 80–99)
NRBC # BLD: 0 K/UL (ref 0–0.01)
NRBC BLD-RTO: 0 PER 100 WBC
P-R INTERVAL, ECG05: 218 MS
PLATELET # BLD AUTO: 87 K/UL (ref 150–400)
PMV BLD AUTO: 11.7 FL (ref 8.9–12.9)
Q-T INTERVAL, ECG07: 382 MS
QRS DURATION, ECG06: 108 MS
QTC CALCULATION (BEZET), ECG08: 451 MS
RBC # BLD AUTO: 3.88 M/UL (ref 4.1–5.7)
SERVICE CMNT-IMP: ABNORMAL
SERVICE CMNT-IMP: ABNORMAL
SERVICE CMNT-IMP: NORMAL
SERVICE CMNT-IMP: NORMAL
TRIGL SERPL-MCNC: 173 MG/DL (ref ?–150)
TROPONIN I SERPL-MCNC: <0.05 NG/ML
VENTRICULAR RATE, ECG03: 84 BPM
VLDLC SERPL CALC-MCNC: 34.6 MG/DL
WBC # BLD AUTO: 4.4 K/UL (ref 4.1–11.1)

## 2019-03-11 PROCEDURE — 74011250637 HC RX REV CODE- 250/637: Performed by: INTERNAL MEDICINE

## 2019-03-11 PROCEDURE — 85027 COMPLETE CBC AUTOMATED: CPT

## 2019-03-11 PROCEDURE — 82962 GLUCOSE BLOOD TEST: CPT

## 2019-03-11 PROCEDURE — 84484 ASSAY OF TROPONIN QUANT: CPT

## 2019-03-11 PROCEDURE — 74011250636 HC RX REV CODE- 250/636: Performed by: HOSPITALIST

## 2019-03-11 PROCEDURE — 97165 OT EVAL LOW COMPLEX 30 MIN: CPT

## 2019-03-11 PROCEDURE — 74011250637 HC RX REV CODE- 250/637: Performed by: FAMILY MEDICINE

## 2019-03-11 PROCEDURE — 99218 HC RM OBSERVATION: CPT

## 2019-03-11 PROCEDURE — 97116 GAIT TRAINING THERAPY: CPT

## 2019-03-11 PROCEDURE — 97161 PT EVAL LOW COMPLEX 20 MIN: CPT

## 2019-03-11 PROCEDURE — 99223 1ST HOSP IP/OBS HIGH 75: CPT | Performed by: INTERNAL MEDICINE

## 2019-03-11 PROCEDURE — 83036 HEMOGLOBIN GLYCOSYLATED A1C: CPT

## 2019-03-11 PROCEDURE — 74011250636 HC RX REV CODE- 250/636: Performed by: FAMILY MEDICINE

## 2019-03-11 PROCEDURE — 94760 N-INVAS EAR/PLS OXIMETRY 1: CPT

## 2019-03-11 PROCEDURE — 80061 LIPID PANEL: CPT

## 2019-03-11 PROCEDURE — 74011250637 HC RX REV CODE- 250/637: Performed by: NURSE PRACTITIONER

## 2019-03-11 PROCEDURE — 36415 COLL VENOUS BLD VENIPUNCTURE: CPT

## 2019-03-11 PROCEDURE — 97112 NEUROMUSCULAR REEDUCATION: CPT

## 2019-03-11 RX ORDER — BETAXOLOL 10 MG/1
5 TABLET, FILM COATED ORAL DAILY
Status: DISCONTINUED | OUTPATIENT
Start: 2019-03-11 | End: 2019-03-11

## 2019-03-11 RX ORDER — ACETAMINOPHEN 10 MG/ML
1000 INJECTION, SOLUTION INTRAVENOUS ONCE
Status: COMPLETED | OUTPATIENT
Start: 2019-03-11 | End: 2019-03-11

## 2019-03-11 RX ORDER — SPIRONOLACTONE 25 MG/1
25 TABLET ORAL DAILY
Status: DISCONTINUED | OUTPATIENT
Start: 2019-03-12 | End: 2019-03-13 | Stop reason: HOSPADM

## 2019-03-11 RX ORDER — OXYCODONE HYDROCHLORIDE 5 MG/1
5 TABLET ORAL
Status: DISCONTINUED | OUTPATIENT
Start: 2019-03-11 | End: 2019-03-13

## 2019-03-11 RX ORDER — LIDOCAINE 4 G/100G
1 PATCH TOPICAL EVERY 24 HOURS
Status: DISCONTINUED | OUTPATIENT
Start: 2019-03-11 | End: 2019-03-13 | Stop reason: HOSPADM

## 2019-03-11 RX ORDER — METOPROLOL SUCCINATE 25 MG/1
12.5 TABLET, EXTENDED RELEASE ORAL DAILY
Status: DISCONTINUED | OUTPATIENT
Start: 2019-03-12 | End: 2019-03-12

## 2019-03-11 RX ADMIN — OXYCODONE HYDROCHLORIDE 5 MG: 5 TABLET ORAL at 22:12

## 2019-03-11 RX ADMIN — HEPARIN SODIUM 5000 UNITS: 5000 INJECTION INTRAVENOUS; SUBCUTANEOUS at 02:03

## 2019-03-11 RX ADMIN — BETAXOLOL HYDROCHLORIDE 5 MG: 10 TABLET, COATED ORAL at 13:25

## 2019-03-11 RX ADMIN — OXYCODONE HYDROCHLORIDE 5 MG: 5 TABLET ORAL at 02:17

## 2019-03-11 RX ADMIN — FAMOTIDINE 20 MG: 20 TABLET ORAL at 21:29

## 2019-03-11 RX ADMIN — Medication 10 ML: at 08:46

## 2019-03-11 RX ADMIN — Medication 10 ML: at 13:52

## 2019-03-11 RX ADMIN — OXYCODONE HYDROCHLORIDE 5 MG: 5 TABLET ORAL at 09:03

## 2019-03-11 RX ADMIN — ACETAMINOPHEN 1000 MG: 10 INJECTION, SOLUTION INTRAVENOUS at 02:48

## 2019-03-11 RX ADMIN — FAMOTIDINE 20 MG: 20 TABLET ORAL at 09:04

## 2019-03-11 RX ADMIN — OXYCODONE HYDROCHLORIDE 5 MG: 5 TABLET ORAL at 13:51

## 2019-03-11 RX ADMIN — Medication 10 ML: at 21:29

## 2019-03-11 RX ADMIN — OXYCODONE HYDROCHLORIDE 5 MG: 5 TABLET ORAL at 18:06

## 2019-03-11 RX ADMIN — ASPIRIN 81 MG 81 MG: 81 TABLET ORAL at 09:03

## 2019-03-11 RX ADMIN — Medication 10 ML: at 05:20

## 2019-03-11 RX ADMIN — FLUOXETINE 20 MG: 10 CAPSULE ORAL at 09:04

## 2019-03-11 NOTE — PROGRESS NOTES
MRI NOTE    A copy of the pacemaker card needs to be faxed to the department    Also we need the name and number of the cardiologist     Any questions    [phone]

## 2019-03-11 NOTE — PROGRESS NOTES
C/o pain 9/10, pressure in chest, pt requesting more pain medication. Telehospitalist paged. Orders received for Ofirmev and lidocaine patch. Ofirmev given per order, lidocaine patch refused.

## 2019-03-11 NOTE — PROGRESS NOTES
Orders received, chart reviewed and patient evaluated by occupational therapy. Recommend patient to discharge to home with no other therapy needed pending progression with skilled acute occupational therapy. Recommend with nursing patient to complete as able in order to maintain strength, endurance and independence: OOB to chair 3x/day, ADLs with supervision/setup and mobilizing to the bathroom for toileting with SBA due to low BP assist. Thank you for your assistance. Full evaluation to follow.

## 2019-03-11 NOTE — PROGRESS NOTES
Occupational Therapy EVALUATION/discharge  Patient: Dalila Pierce (4 y.o. male)  Date: 3/11/2019  Primary Diagnosis: TIA (transient ischemic attack) [G45.9]       Precautions:        ASSESSMENT:   Based on the objective data described below, the patient presents with generalized weakness, decreased endurance, strength, functional mobility, and ADLs. Pt was living at home with family, independent with ADLs and ILS , driving and working. He was cleared to be seen for therapy and all VSS and pt was independent with bed mobility , and able to stand with no assist.  He donned his socks sitting on EOb and his SBP increased to 147 and after he was sitting SBP was 94. Pt was able to stand and walk to the bathroom and independent with his transfers to toilet. He was tired once he sat down and was trying to sit up on EOb, then he needed to lay down to rest.  Pt did sound as if he was a little SOB while he was talking. BP remained stable. Pt at this time has no OT needs and has weakness in right UE due to previous CVA and scored 59/66 on Fugl virgen. He was left supine in bed and call bell within reach and recommend that pt return home with family     . Further skilled acute occupational therapy is not indicated at this time. Discharge Recommendations: None  Further Equipment Recommendations for Discharge: none      SUBJECTIVE:   Patient stated I get tired too quickly.     OBJECTIVE DATA SUMMARY:   HISTORY:   Past Medical History:   Diagnosis Date    Asthma     CAD (coronary artery disease)     Calculus of kidney     Dizziness 3/11/2019    Gout     Heart failure ([address]     with pacemaker, states transition of great vessels    Pacemaker     Stroke ([facility] [address]     Syncope     Transposition great arteries      Past Surgical History:   Procedure Laterality Date    HX PACEMAKER         Prior Level of Function/Environment/Context: pt lives with family and was independent with ADLs and ILS, working and driving PTA.    Expanded or extensive additional review of patient history:     Home Situation  Home Environment: Private residence  # Steps to Enter: 1  One/Two Story Residence: One story  Living Alone: No  Support Systems: Family member(s)  Patient Expects to be Discharged to[de-identified] Private residence  Current DME Used/Available at Home: None  Tub or Shower Type: Tub/Shower combination    Hand dominance: Right    EXAMINATION OF PERFORMANCE DEFICITS:  Cognitive/Behavioral Status:  Neurologic State: Alert  Orientation Level: Oriented X4                Skin: in good health     Edema: none    Hearing: Auditory  Auditory Impairment: None    Vision/Perceptual:                 intact                    Range of Motion:    AROM: Generally decreased, functional  PROM: Within functional limits                      Strength:    Strength:  Within functional limits                Coordination:  Coordination: Generally decreased, functional(R hand from previous stroke)            Tone & Sensation:    Tone: Abnormal(R hand)  Sensation: Intact                      Balance:  Sitting: Intact  Standing: Intact    Functional Mobility and Transfers for ADLs:  Bed Mobility:  Rolling: Independent  Supine to Sit: Independent  Scooting: Independent    Transfers:  Sit to Stand: Independent  Stand to Sit: Independent    ADL Assessment:     Pt is independent                      Functional Measure:  Fugl-Mortensen Assessment of Motor Recovery after Stroke:     Reflex Activity  Flexors/Biceps/Fingers: Can be elicited  Extensors/Triceps: Can be elicited  Reflex Subtotal: 4    Volitional Movement Within Synergies  Shoulder Retraction: Full  Shoulder Elevation: Full  Shoulder Abduction (90 degrees): Full  Shoulder External Rotation: Full  Elbow Flexion: Full  Forearm Supination: Full  Shoulder Adduction/Internal Rotation: Full  Elbow Extension: Full  Forearm Pronation: Full  Subtotal: 18    Volitional Movement Mixing Synergies  Hand to Lumbar Spine: Full  Shoulder Flexion (0-90 degrees): Full  Pronation-Supination: Full  Subtotal: 6    Volitional Movement With Little or No Synergy  Shoulder Abduction (0-90 degrees): Full  Shoulder Flexion ( degrees): Full  Pronation/Supination: Full  Subtotal : 6    Normal Reflex Activity  Biceps, Triceps, Finger Flexors: Full  Subtotal : 2    Upper Extremity Total   Upper Extremity Total: 36    Wrist  Stability at 15 Degree Dorsiflexion: Full  Repeated Dorsiflexion/ Volar Flexion: Full  Stability at 15 Degree Dorsiflexion: Full  Repeated Dorsiflexion/ Volar Flexion: Full  Circumduction: Full  Wrist Total: 10    Hand  Mass Flexion: Full  Mass Extension: Full  Grasp A: Partial  Grasp B: Partial  Grasp C: Partial  Grasp D: Partial  Grasp E: Partial  Hand Total: 9    Coordination/Speed  Tremor: None  Dysmetria: Slight  Time: 2-5s  Coordination/Speed Total : 4    Total A-D  Total A-D (Motor Function): 59/66         This is a reliable/valid measure of arm function after a neurological event. It has established value to characterize functional status and for measuring spontaneous and therapy-induced recovery; tests proximal and distal motor functions. Fugl-Mortensen Assessment  UE scores recorded between five and 30 days post neurologic event can be used to predict UE recovery at six months post neurologic event. Severe = 0-21 points   Moderately Severe = 22-33 points   Moderate = 34-47 points   Mild = 48-66 points  MICHOACANO Dunn, AJ Carlos, & MITCHELL Nelson (1992). Measurement of motor recovery after stroke: Outcome assessment and sample size requirements.  Stroke, 23, pp. 8319-1592.   --------------------------------------------------------------------------------------------------------------------------------------------------------------------  MCID:  Stroke:   Benedict Vazquez et al, 2001; n = 171; mean age 79 (6) years; assessed within 16 (12) days of stroke, Acute Stroke)  FMA Motor Scores from Admission to Discharge   10 point increase in FMA Upper Extremity = 1.5 change in discharge FIM   10 point increase in FMA Lower Extremity = 1.9 change in discharge FIM  MDC:   Stroke:   Yaya Hernandez et al, 2008, n = 14, mean age = 59.9 (14.6) years, assessed on average 14 (6.5) months post stroke, Chronic Stroke)   FMA = 5.2 points for the Upper Extremity portion of the assessment         Occupational Therapy Evaluation Charge Determination   History Examination Decision-Making   MEDIUM Complexity : Expanded review of history including physical, cognitive and psychosocial  history  MEDIUM Complexity : 3-5 performance deficits relating to physical, cognitive , or psychosocial skils that result in activity limitations and / or participation restrictions MEDIUM Complexity : Patient may present with comorbidities that affect occupational performnce. Miniml to moderate modification of tasks or assistance (eg, physical or verbal ) with assesment(s) is necessary to enable patient to complete evaluation       Based on the above components, the patient evaluation is determined to be of the following complexity level: MEDIUM  Pain:  Pain Scale 1: Numeric (0 - 10)  Pain Intensity 1: 7  Pain Location 1: Chest  Pain Orientation 1: Left  Pain Description 1: Pressure  Pain Intervention(s) 1: Medication (see MAR)  Activity Tolerance:   fair  Please refer to the flowsheet for vital signs taken during this treatment. After treatment:   []  Patient left in no apparent distress sitting up in chair  [x]  Patient left in no apparent distress in bed  [x]  Call bell left within reach  [x]  Nursing notified  [x]  Caregiver present  []  Bed alarm activated    COMMUNICATION/EDUCATION:   Communication/Collaboration:  [x]      Home safety education was provided and the patient/caregiver indicated understanding. [x]      Patient/family have participated as able and agree with findings and recommendations.   []      Patient is unable to participate in plan of care at this time.   Findings and recommendations were discussed with: Physical Therapist, Registered Nurse and family    Desmond Foster OT  Time Calculation: 24 mins

## 2019-03-11 NOTE — CONSULTS
IP CONSULT TO NEUROLOGY  Consult performed by: Yordan Winters MD  Consult ordered by: Jim Priest MD            NEUROLOGY CONSULT    NAME Ellis Whaley AGE 35 y.o. MRN [id number]  1985     REQUESTING PHYSICIAN: Hazel Salazar,*      CHIEF COMPLAINT:  stroke     This is a 35 y.o. male with a medical history of seizures,  transposition of great arteries and stroke. He is admitted for confusion and sensory change affecting upper limbs. Also endorse dizziness. This has been ongoing for three days. His mother who is with him states that has not been himself. He has a history of febrile seizures and was on dilantin till age [de-identified]. He has not had any convulsions. He has been recently place on the heart transplant list. He has residual left upper extremity weakness from a remote stroke. ASSESSMENT AND PLAN     1. Altered mental status  EEG  Doubt that this is a cerebrovascular event. Can't get an MRI because of the pace maker. May repeat CT in the am  continue aspirin    2. Acute chest pain  Cardiology following      3. Transposition great arteries  Cardiology following    4. Hypotension, unspecified hypotension type  Baseline for him per patient.          ALLERGIES:  Betadine [povidone-iodine] and Contrast agent [iodine]     Current Facility-Administered Medications   Medication Dose Route Frequency    lidocaine 4 % patch 1 Patch  1 Patch TransDERmal Q24H    oxyCODONE IR (ROXICODONE) tablet 5 mg  5 mg Oral Q4H PRN    betaxolol (KERLONE) tablet 5 mg  5 mg Oral DAILY    FLUoxetine (PROzac) capsule 20 mg  20 mg Oral DAILY    sodium chloride (NS) flush 5-40 mL  5-40 mL IntraVENous Q8H    sodium chloride (NS) flush 5-40 mL  5-40 mL IntraVENous PRN    acetaminophen (TYLENOL) tablet 650 mg  650 mg Oral Q4H PRN    Or    acetaminophen (TYLENOL) solution 650 mg  650 mg Per NG tube Q4H PRN    Or    acetaminophen (TYLENOL) suppository 650 mg  650 mg Rectal Q4H PRN    aspirin chewable tablet 81 mg 81 mg Oral DAILY    famotidine (PEPCID) tablet 20 mg  20 mg Oral Q12H    heparin (porcine) injection 5,000 Units  5,000 Units SubCUTAneous Q8H    nitroglycerin (NITROSTAT) tablet 0.4 mg  0.4 mg SubLINGual Q5MIN PRN    ondansetron (ZOFRAN) injection 4 mg  4 mg IntraVENous Q4H PRN       Past Medical History:   Diagnosis Date    Asthma     CAD (coronary artery disease)     Calculus of kidney     Dizziness 3/11/2019    Gout     Heart failure [address]     with pacemaker, states transition of great vessels    Pacemaker     Stroke ([facility] [address] [facility]     Syncope     Transposition great arteries        Social History     Tobacco Use    Smoking status: Never Smoker    Smokeless tobacco: Never Used    Tobacco comment: advised not to start   Substance Use Topics    Alcohol use: No       Family History   Myocardial infarction: mother and maternal grandmother  No huntingtons     Review of Systems   Constitutional: Negative for chills and fever. HENT: Negative for ear pain. Eyes: Negative for pain and discharge. Respiratory: Negative for cough and hemoptysis. Cardiovascular: Negative for chest pain and claudication. Gastrointestinal: Negative for constipation and diarrhea. Genitourinary: Negative for flank pain and hematuria. Musculoskeletal: Positive for myalgias. Negative for back pain. Skin: Negative for itching and rash. Neurological: Positive for tingling and sensory change. Negative for headaches. Endo/Heme/Allergies: Negative for environmental allergies. Does not bruise/bleed easily. Psychiatric/Behavioral: Positive for memory loss. Negative for depression and hallucinations. The patient is nervous/anxious. Visit Vitals  BP 99/60   Pulse 86   Temp 98 °F (36.7 °C)   Resp 18   Ht 5' 7\" (1.702 m)   Wt 163 lb (73.9 kg)   SpO2 95%   BMI 25.53 kg/m²      General: Well developed, well nourished.  Patient in no apparent distress   Head: Normocephalic, atraumatic, anicteric sclera   Neck Normal ROM, No thyromegally   Lungs:  Clear to auscultation bilaterally, No wheezes or rubs   Cardiac: Regular rate and rhythm with no murmurs. Abd: Bowel sounds were audible. No tenderness on palpation   Ext: No pedal edema   Skin: Supple no rash     NeurologicExam:  Mental Status: Alert and oriented to person place and time  MMSE 30/30   Speech: Fluent no aphasia or dysarthria. Cranial Nerves:  II - XII Intact   Motor:  Claw hand on the right with only slight weakness of the upper extremity otherwise 5/5 strength. Right arm cannot fully suppinate   Reflexes:   Deep tendon reflexes 2+/4 and symmetric. Sensory:   Symmetric and intact with no perceived deficits modalities involving small or large fibers. Tremor:   No tremor noted. Cerebellar:  Coordination intact. Neurovascular: No carotid bruits. No JVD       REVIEWED IMAGING:    MRI :  No results found for this or any previous visit. CT:  Results from Hospital Encounter encounter on 03/10/19   CT HEAD WO CONT    Narrative INDICATION:  Dizziness and diplopia     EXAM:  HEAD CT WITHOUT CONTRAST    COMPARISON:  9/25/2017    TECHNIQUE:  Routine noncontrast axial head CT was performed. Coronal and  sagittal multiplanar reconstructions were obtained. CT dose reduction was  achieved through use of a standardized protocol tailored for this examination  and automatic exposure control for dose modulation. FINDINGS:    The ventricles and cortical sulci are within normal limits. No evidence for acute intracranial hemorrhage, midline shift, or mass effect. Unchanged bifrontal focal hypoattenuation compatible with chronic injuries. The basal cisterns are patent. The visualized paranasal sinuses and mastoid air cells are clear. No calvarial abnormality.       Impression IMPRESSION:    Stable exam without evidence for acute intracranial abnormality              REVIEWED LABS:  Lab Results   Component Value Date/Time    WBC 4.4 03/11/2019 02:13 AM    HCT 37.6 03/11/2019 02:13 AM    HGB 12.5 03/11/2019 02:13 AM    PLATELET 87 (L) 54/28/1524 02:13 AM     Lab Results   Component Value Date/Time    Sodium 139 03/10/2019 11:31 AM    Potassium 3.9 03/10/2019 11:31 AM    Chloride 104 03/10/2019 11:31 AM    CO2 27 03/10/2019 11:31 AM    Glucose 131 (H) 03/10/2019 11:31 AM    BUN 39 (H) 03/10/2019 11:31 AM    Creatinine 1.94 (H) 03/10/2019 11:31 AM    Calcium 8.1 (L) 03/10/2019 11:31 AM       Lab Results   Component Value Date/Time    LDL, calculated 29.4 03/11/2019 02:13 AM     Lab Results   Component Value Date/Time    Hemoglobin A1c 4.9 03/11/2019 02:13 AM

## 2019-03-11 NOTE — PROGRESS NOTES
* No surgery found *  * No surgery found *  Bedside shift change report given to Allegra Fonseca and Niraj Obrien (oncoming nurse) by Vivek Perez (offgoing nurse). Report included the following information [facility] Phone:   [phone]      Significant changes during shift:  Seen by cardiology and neurology, records requested from [address] [place] [address], pt refused echo/duplex, states continued pain and pressure in chest and down right arm since before admission, patient states pain only partially alleviated by current meds, patient reports pain in left foot is gout - Dr. Klein Fails notified and increased frequency of pain meds, patient states neurologist MRI presents too much risk for pacemaker         Patient Information    Venancio Calvillo  35 y.o.  3/10/2019 11:14 AM by Evelia Galdamez MD. Venancio Calvillo was admitted from Home    Problem List    Patient Active Problem List    Diagnosis Date Noted    Dizziness 03/11/2019    TIA (transient ischemic attack) 03/10/2019    Chronic chest pain 10/11/2016    Transposition great arteries 10/11/2016    Right ventricular failure ([address] 10/11/2016    Wheezing 10/11/2016    History of DVT (deep vein thrombosis) 10/11/2016    H/O Mustard procedure 10/11/2016    History of CVA (cerebrovascular accident) 10/11/2016    Pacemaker 10/11/2016    Chronic pain 10/11/2016     Past Medical History:   Diagnosis Date    Asthma     CAD (coronary artery disease)     Calculus of kidney     Dizziness 3/11/2019    Gout     Heart failure ([address]     with pacemaker, states transition of great vessels    Pacemaker     Stroke ([address]     Syncope     Transposition great arteries          Core Measures:    CVA: Yes Yes  CHF:No No  PNA:No No    Activity Status:    OOB to Chair No  Ambulated this shift Yes   Bed Rest No    Supplemental O2: (If Applicable)    NC No  NRB No  Venti-mask No  Room air    LINES AND DRAINS:    PIV    DVT prophylaxis:    DVT prophylaxis Med- Yes - refused  DVT prophylaxis SCD or FRED- No     Wounds: (If Applicable)    Wounds- No    Location     Patient Safety:    Falls Score Total Score: 3  Safety Level_______  Bed Alarm On? No  Sitter?  No    Plan for upcoming shift: family to bring in home meds, echo and carotid        Discharge Plan: Yes TBD    Active Consults:  IP CONSULT TO NEUROLOGY  IP CONSULT TO NEUROLOGY  IP CONSULT TO HOSPITALIST  IP CONSULT TO CARDIOLOGY

## 2019-03-11 NOTE — PROGRESS NOTES
Speech pathology  Orders received, chart reviewed, spoke with RN and patient. Patient reports when he first came in he had some trouble formulating his thought; however, he feels this is now back to baseline. He is swallowing without issue. No formal speech or swallow eval is indicated.    Pineda Barrera M.S. GIANNA-SLP

## 2019-03-11 NOTE — ROUTINE PROCESS
Contacted Ngozi Bruce NP regarding betaxolol order and patient's low baseline blood pressure.   Verbal order provided to hold if Systolic BP <93

## 2019-03-11 NOTE — CONSULTS
[facility] Consult Note      DOS:   3/11/2019  NAME:  Larry Jackson   MRN:   [id number]   REFERRING PROVIDER:  Rosana Brody MD  PRIMARY CARE PHYSICIAN: Fidelina Neil NP  PRIMARY CARDIOLOGIST: Rosana Brody MD      Chief Complaint:   Chief Complaint   Patient presents with    Chest Pain       HPI: 35y.o. 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 [facility] He underwent evaluation including CT of his head which revealed no new deficits. He has been followed by both the [facility] and [facility] adult congenital heart clinics but is now being followed at [facility] by Dr. Sharlene Orellana in the Adult [facility]. His CPX revealed significant myocardial dysfunction with MvO2 11.8 but his RHC revealed normal filling pressures and high cardiac output. By the patient's report, he recently underwent a Holter monitor at [facility] and had adjustments to his pacemaker. This records are not available on Care Everywhere. The double vision and upper extremity spasms have resolved. He continues to have chronic atypical chest pain. He denies orthopnea, PND, edema. He admits to palpitations, presyncope and syncope. He notes dyspnea and fatigue with moderate exertion. Impression / Plan:   Heart Failure Status: NYHA Class II    1.  CHD - TGA s/p Mustard (RV is systemic ventricle) with systemic ventricular failure and systemic AV valve regurgitation   On Toprol XL, spironolactone, torsemide as outpatient - held on admission   CPX with mVO2 11.8 but RHC with normal CO and PCWP   Resume low doseToprol XL, spironolactone   Hold torsemide   Echo - request peds cardiology to read   Check NT proBNP   Recommend outpatient CPX   Would happily follow him in the [facility] and coordinate care with [place] [facility]     2. Sinoatrial Dysfunction s/p atrial pacemaker ([employer])   Interrogate pacer    3. Syncope   Evaluated at [facility] recent Holter results    4. Depression/Anxiety   On prozac and buspar as outpatient   Continue prozac    Would benefit from cognitive based therapy    5. Gout   Check uric acid    6. Chronic Pain Syndrome   History of seeking narcotics   Dismissed from [facility] for violation of medication agreement on November 9, 2015   Comprehensive drug screen    7.  History of CVA as Infant with residual right sided deficits   No evidence of new CVA   Started on ASA 81 mg daily      History:  Past Medical History:   Diagnosis Date    Asthma     CAD (coronary artery disease)     Calculus of kidney     Dizziness 3/11/2019    Gout     Heart failure ([facility] [address]     with pacemaker, states transition of great vessels    Pacemaker     Stroke ([facility] [address]     Syncope     Transposition great arteries      Past Surgical History:   Procedure Laterality Date    HX PACEMAKER       Social History     Socioeconomic History    Marital status:      Spouse name: Not on file    Number of children: Not on file    Years of education: Not on file    Highest education level: Not on file   Social Needs    Financial resource strain: Not on file    Food insecurity - worry: Not on file    Food insecurity - inability: Not on file   [employer] needs - medical: Not on file   [employer] needs - non-medical: Not on file   Occupational History    Not on file   Tobacco Use    Smoking status: Never Smoker    Smokeless tobacco: Never Used    Tobacco comment: advised not to start   Substance and Sexual Activity    Alcohol use: No    Drug use: Yes     Types: Marijuana    Sexual activity: Yes     Partners: Female     Birth control/protection: Condom   Other Topics Concern    Not on file   Social History Narrative    Not on file     No family history on file. Current Medications:   Current Facility-Administered Medications   Medication Dose Route Frequency Provider Last Rate Last Dose    lidocaine 4 % patch 1 Patch  1 Patch TransDERmal Q24H Lianna Alegre MD        oxyCODONE IR (ROXICODONE) tablet 5 mg  5 mg Oral Q4H PRN David Sánchez DO   5 mg at 03/11/19 1351    betaxolol (KERLONE) tablet 5 mg  5 mg Oral DAILY Jodee Randle NP   5 mg at 03/11/19 1325    FLUoxetine (PROzac) capsule 20 mg  20 mg Oral DAILY Jw Preciado MD   20 mg at 03/11/19 0904    sodium chloride (NS) flush 5-40 mL  5-40 mL IntraVENous Q8H Jw Preciado MD   10 mL at 03/11/19 1352    sodium chloride (NS) flush 5-40 mL  5-40 mL IntraVENous PRN Jw Preciado MD   10 mL at 03/11/19 0846    acetaminophen (TYLENOL) tablet 650 mg  650 mg Oral Q4H PRN Jw Preciado MD   650 mg at 03/10/19 2247    Or    acetaminophen (TYLENOL) solution 650 mg  650 mg Per NG tube Q4H PRN Jw Preciado MD        Or   Ct acetaminophen (TYLENOL) suppository 650 mg  650 mg Rectal Q4H PRN Jw Preciado MD        aspirin chewable tablet 81 mg  81 mg Oral DAILY Jw Preciado MD   81 mg at 03/11/19 0903    famotidine (PEPCID) tablet 20 mg  20 mg Oral Q12H Jw Preciado MD   20 mg at 03/11/19 0904    heparin (porcine) injection 5,000 Units  5,000 Units SubCUTAneous Q8H Jw Preciado MD   5,000 Units at 03/11/19 0203    nitroglycerin (NITROSTAT) tablet 0.4 mg  0.4 mg SubLINGual Q5MIN PRN Jw Preciado MD        ondansetron [facility]) injection 4 mg  4 mg IntraVENous Q4H PRN Jw Preciado MD   4 mg at 03/10/19 1953       Allergies:    Allergies   Allergen Reactions    Betadine [Povidone-Iodine] Rash    Contrast Agent [Iodine] Itching     Need to pre-medicate with benadryl       ROS:    Review of Systems   Constitutional: Positive for malaise/fatigue. HENT: Negative. Eyes: Positive for double vision. Respiratory: Positive for shortness of breath. Cardiovascular: Positive for chest pain and palpitations. Gastrointestinal: Negative. Genitourinary: Negative. Musculoskeletal: Negative. Skin: Negative. Neurological: Positive for dizziness, focal weakness, seizures and headaches. Endo/Heme/Allergies: Negative. Psychiatric/Behavioral: Negative. Physical Exam:   Physical Exam   Constitutional: He appears well-developed and well-nourished. HENT:   Head: Normocephalic and atraumatic. Eyes: EOM are normal. Pupils are equal, round, and reactive to light. Neck: Neck supple. Cardiovascular: Normal rate and regular rhythm. Exam reveals gallop. Murmur heard. Pulmonary/Chest: Effort normal.   Abdominal: Soft. Bowel sounds are normal.   Musculoskeletal:   Right arm - unable to completely extend    Left great toe - tender to palpation   Neurological:   Right arm strength - grade 4/5 strength   Skin: Skin is warm and dry.    Psychiatric: His behavior is normal.       Vitals:   Visit Vitals  BP 99/60   Pulse 86   Temp 98 °F (36.7 °C)   Resp 18   Ht 5' 7\" (1.702 m)   Wt 163 lb (73.9 kg)   SpO2 95%   BMI 25.53 kg/m²         Temp (24hrs), Av.5 °F (36.4 °C), Min:97.1 °F (36.2 °C), Max:98 °F (36.7 °C)       Admission Weight: Last Weight   Weight: 163 lb 5.8 oz (74.1 kg) Weight: 163 lb (73.9 kg)     Intake / Output / Drain:  Last 24 hrs.:     Intake/Output Summary (Last 24 hours) at 3/11/2019 1615  Last data filed at 3/11/2019 1604  Gross per 24 hour   Intake 520 ml   Output    Net 520 ml       Oxygen Therapy:  Oxygen Therapy  O2 Sat (%): 95 % (19 1530)  Pulse via Oximetry: 84 beats per minute (03/10/19 1600)  O2 Device: Room air (19 1530)      Recent Labs:   Labs Latest Ref Rng & Units 3/11/2019 3/10/2019   WBC 4.1 - 11.1 K/uL 4.4 5.6   RBC 4.10 - 5.70 M/uL 3.88(L) 4.34   Hemoglobin 12.1 - 17.0 g/dL 12.5 14.1 Hematocrit 36.6 - 50.3 % 37.6 41.2   MCV 80.0 - 99.0 FL 96.9 94.9   Platelets 594 - 082 K/uL 87(L) 114(L)   Lymphocytes 12 - 49 % - 25   Monocytes 5 - 13 % - 9   Eosinophils 0 - 7 % - 2   Basophils 0 - 1 % - 1   Albumin 3.5 - 5.0 g/dL - 3.7   Calcium 8.5 - 10.1 MG/DL - 8. 1(L)   SGOT 15 - 37 U/L - 29   Glucose 65 - 100 mg/dL - 131(H)   BUN 6 - 20 MG/DL - 39(H)   Creatinine 0.70 - 1.30 MG/DL - 1.94(H)   Sodium 136 - 145 mmol/L - 139   Potassium 3.5 - 5.1 mmol/L - 3.9   Some recent data might be hidden     EKG:   EKG Results     Procedure 720 Value Units Date/Time    EKG 12 LEAD INITIAL [[id number]] Collected:  03/10/19 1121    Order Status:  Completed Updated:  03/11/19 0911     Ventricular Rate 84 BPM      Atrial Rate 84 BPM      P-R Interval 218 ms      QRS Duration 108 ms      Q-T Interval 382 ms      QTC Calculation (Bezet) 451 ms      Calculated R Axis -168 degrees      Calculated T Axis 26 degrees      Diagnosis --     Atrial-paced rhythm with prolonged AV conduction  Right bundle branch block , plus right ventricular hypertrophy  Inferior infarct (cited on or before 19-MAY-2018)  T wave abnormality, consider lateral ischemia  When compared with ECG of 06-AUG-2018 15:48,  No significant change was found  Confirmed by Rebecca Kat ([id number]) on 3/11/2019 9:11:29 AM      EKG 12 LEAD INITIAL [[id number]]     Order Status:  Sent         Echocardiogram:   10/11/2016  1. The transposition of his great arteries status post Mustard atrial   switch operation. 2. No obvious baffle obstruction and no baffle leak or shunts are   detected. 3.  There is moderate-to-severe right ventricular (systemic ventricle)   dilation with mildly diminished RV function unchanged from our last   study in our system from 10/2015.    4. Mild systemic AV valve regurgitation with expected systemic level   pressures in the right ventricle. 5. No significant mitral regurgitation.    6. The left ventricular cavity is somewhat diminished given a low   pressure nature of the pulmonic ventricle. The left ventricular function   appears hyperdynamic. 7. The left ventricular outflow tract (subpulmonary region) is somewhat   narrowed but there is no gradient seen. 8. The patient does appear to have trace aortic insufficiency which is   likely of no clinical significance. 9. The aortic arch is widely patent with no evidence of any obstruction   or coarctation. 10. There is no ventricular level shunting detected. 11. There is no significant pericardial effusion or evidence of   pericarditis.         CONCLUSIONS:     1. Dextrotransposition of the great arteries status post Mustard repair. 2. No significant change from most recent echocardiogram in our   system. 3. Excellent long-term result without evidence of any suggestion of   acute cardiac compromise at this time. Nuclear Studies - V/Q Scan:  3/10/19  Low probability for pulmonary  embolism. Asymmetric diminished left lung ventilation, nonspecific    CPX:  7/28/17  Exercise duration 3:47  Peak mVO2 11.8 ml/kg/min  Peak RER 1.3    Cardiac Catheterization:  5/15/2017  RA 9, RV 33/4, PA 24/12 18, PCWP 15 mmHg, CO 8.1, CI 4.4  Normal coronary angiography    Radiology (CXR, CT scans):   Nm Lung Vent/perf    Result Date: 3/11/2019  IMPRESSION: Agree with preliminary report. Low probability for pulmonary embolism. Asymmetric diminished left lung ventilation, nonspecific. Xr Chest Pa Lat    Result Date: 3/10/2019  IMPRESSION: No acute process. Ct Head Wo Cont    Result Date: 3/10/2019  IMPRESSION: Stable exam without evidence for acute intracranial abnormality      Us Retroperitoneum Comp    Result Date: 3/10/2019  IMPRESSION: Normal renal and bladder ultrasound.          Lynette Jimenez MD    [address] [place] [address] [place]  [address] [facility], [address] [place] [address] [phone]  Fax [phone]    24 hour VAD/HF Pager: [phone]

## 2019-03-11 NOTE — PROGRESS NOTES
physical Therapy EVALUATION/DISCHARGE  Patient: Akosua Garcia (89 y.o. male)  Date: 3/11/2019  Primary Diagnosis: TIA (transient ischemic attack) [G45.9]       Precautions:      ASSESSMENT :  Based on the objective data described below, the patient presents with baseline mobility. Only impaired gait due to gout flare up in the L great toe. Pt was received in supine and cleared by nursing to mobilize, mother at bedside. Pt with extensive hx of strokes and cardiac issues, on the transplant list at [facility] for a heart. He performed all mobility at an independent level and is aware of his limitations and when he needs to let his body rest. Pt denied any specific weakness other than generalized due to his heart condition. In rounds noted talk of possible transfer to [place]? Dicussed with pt and to have nursing staff we with him during mobility due to low BP and feeling dizzy. He was returned to bed at the end of the session. No acute PT need or at discharge. Will complete order. Still awaiting MRI at time of eval.    Further skilled acute physical therapy is not indicated at this time. PLAN :  Discharge Recommendations: None  Further Equipment Recommendations for Discharge: none     SUBJECTIVE:   Patient stated my legs were feeling tingly, but that is gone now.     OBJECTIVE DATA SUMMARY:   HISTORY:    Past Medical History:   Diagnosis Date    Asthma     CAD (coronary artery disease)     Calculus of kidney     Dizziness 3/11/2019    Gout     Heart failure ([address]     with pacemaker, states transition of great vessels    Pacemaker     Stroke ([address]     Syncope     Transposition great arteries      Past Surgical History:   Procedure Laterality Date    HX PACEMAKER       Prior Level of Function/Home Situation: pt lives with 2 young children. Still drives and still working. He is independent with mobility, but is limited by his heart condition. On transplant waiting list at [facility].   Personal factors and/or comorbidities impacting plan of care:     Home Situation  Home Environment: Private residence  # Steps to Enter: 1  One/Two Story Residence: One story  Living Alone: No  Support Systems: Family member(s)  Patient Expects to be Discharged to[de-identified] Private residence  Current DME Used/Available at Home: None  Tub or Shower Type: Tub/Shower combination    EXAMINATION/PRESENTATION/DECISION MAKING:   Critical Behavior:  Neurologic State: Alert  Orientation Level: Oriented X4        Hearing: Auditory  Auditory Impairment: None  Skin:  intact  Edema: none  Range Of Motion:  AROM: Generally decreased, functional           PROM: Within functional limits           Strength:    Strength: Within functional limits                    Tone & Sensation:   Tone: Abnormal(R hand)              Sensation: Intact               Coordination:  Coordination: Generally decreased, functional(R hand from previous stroke)  Vision:      Functional Mobility:  Bed Mobility:  Rolling: Independent  Supine to Sit: Independent     Scooting: Independent  Transfers:  Sit to Stand: Independent  Stand to Sit: Independent                       Balance:   Sitting: Intact  Standing: Intact  Ambulation/Gait Training:  Distance (ft): 20 Feet (ft)  Assistive Device: (none)  Ambulation - Level of Assistance: Independent        Gait Abnormalities: Antalgic(L big toe)        Base of Support: Widened     Speed/Chaparrita: Slow                        Functional Measure:  Barthel Index:    Bathin  Bladder: 10  Bowels: 10  Groomin  Dressing: 10  Feeding: 10  Mobility: 5  Stairs: 5  Toilet Use: 10  Transfer (Bed to Chair and Back): 15  Total: 85/100       Percentage of impairment   0%   1-19%   20-39%   40-59%   60-79%   80-99%   100%   Barthel Score 0-100 100 99-80 79-60 59-40 20-39 1-19   0     The Barthel ADL Index: Guidelines  1. The index should be used as a record of what a patient does, not as a record of what a patient could do.   2. The main aim is to establish degree of independence from any help, physical or verbal, however minor and for whatever reason. 3. The need for supervision renders the patient not independent. 4. A patient's performance should be established using the best available evidence. Asking the patient, friends/relatives and nurses are the usual sources, but direct observation and common sense are also important. However direct testing is not needed. 5. Usually the patient's performance over the preceding 24-48 hours is important, but occasionally longer periods will be relevant. 6. Middle categories imply that the patient supplies over 50 per cent of the effort. 7. Use of aids to be independent is allowed. Vijaya Rodríguez., Barthel, DKimW. ([id number]). Functional evaluation: the Barthel Index. [address] [facility] (14)2. PETE Iglesias, Dante Escobar., Lalito Crews., [facility], [address] [facility] (1999). Measuring the change indisability after inpatient rehabilitation; comparison of the responsiveness of the Barthel Index and Functional [place] Measure. Journal of Neurology, Neurosurgery, and Psychiatry, 66(4), 628-971. Sara Ryan, N.J.A, NANCY Simeon, & Moses Oleary, M.A. (2004.) Assessment of post-stroke quality of life in cost-effectiveness studies: The usefulness of the Barthel Index and the EuroQoL-5D.  Quality of Life Research, 15, 306-95            Physical Therapy Evaluation Charge Determination   History Examination Presentation Decision-Making   HIGH Complexity :3+ comorbidities / personal factors will impact the outcome/ POC  LOW Complexity : 1-2 Standardized tests and measures addressing body structure, function, activity limitation and / or participation in recreation  MEDIUM Complexity : Evolving with changing characteristics  Other outcome measures barthel  LOW       Based on the above components, the patient evaluation is determined to be of the following complexity level: LOW     Pain:  Pain Scale 1: Numeric (0 - 10)  Pain Intensity 1: 7  Pain Location 1: Chest  Activity Tolerance:   fair  Please refer to the flowsheet for vital signs taken during this treatment. After treatment:   []   Patient left in no apparent distress sitting up in chair  [x]   Patient left in no apparent distress in bed  [x]   Call bell left within reach  [x]   Nursing notified  []   Caregiver present  []   Bed alarm activated    COMMUNICATION/EDUCATION:   Communication/Collaboration:  [x]   Fall prevention education was provided and the patient/caregiver indicated understanding. [x]   Patient/family have participated as able and agree with findings and recommendations. []   Patient is unable to participate in plan of care at this time.   Findings and recommendations were discussed with: Occupational Therapist and Registered Nurse    Thank you for this referral.  Venancio Sanchez, PT, DPT   Time Calculation: 25 mins

## 2019-03-11 NOTE — PROGRESS NOTES
Orders received, chart reviewed and patient evaluated by physical therapy. Recommend patient to discharge to home without PT services. Recommend with nursing patient to complete as able in order to maintain strength, endurance and independence: OOB to chair 3x/day with 1 person assist and ambulating short distance with nursing staff to monitor. Thank you for your assistance. Full evaluation to follow.

## 2019-03-11 NOTE — CONSULTS
[address], [address] [facility]     Date of  Admission: 3/10/2019 11:14 AM     Admission type:Emergency    Consult for: chest pain  Consult by: Hospitalist     Subjective:     [place] is a 35 y.o. male admitted for TIA (transient ischemic attack) [G45.9]. Admitted with right sided chest pain, HA, double vision, and right arm and hand spasms. States that he has chronic CP, but the other symptoms were \"new\". He continues with 9/10 CP, pointing below xyphoid process, SOB, dizziness/lightheadedness, double vision, syncope. Mother at bedside mentions strong family hx of heart disease, and \"he probably needs a cath\". Admission to [facility] in 2017 with similar complaints. Patient states that he is scheduled for a Pain Management clinic at [facility] in 3 weeks. ECG A paced, no other acute changes. 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 Neftali Carrington (sp) ([phone]). Seen last week with adjustment of medications - was on Toprol, but c/o tiredness, weakness. Changed to Betaxalol. Cardiac cath at [facility] 1 year ago, normal coronaries. Patient states he is on the heart transplant list.     Medtronic pacemaker x 10 years (no RV lead, A paced only). Last week during visit at [facility], saw EP who \"adjusted pacemaker\" as patient c/o \"stabbing pain\" at site of pacemaker. Pain has been relieved since. CVA several years ago, right sided weakness    Significant psych hx. Cardiac risk factors: family history, sedentary life style, male gender.       Patient Active Problem List    Diagnosis Date Noted    Dizziness 03/11/2019    TIA (transient ischemic attack) 03/10/2019    Chronic chest pain 10/11/2016    Transposition great arteries 10/11/2016    Right ventricular failure ([address] 10/11/2016    Wheezing 10/11/2016    History of DVT (deep vein thrombosis) 10/11/2016    H/O Mustard procedure 10/11/2016    History of CVA (cerebrovascular accident) 10/11/2016    Pacemaker 10/11/2016    Chronic pain 10/11/2016      Young Peña NP  Past Medical History:   Diagnosis Date    Asthma     CAD (coronary artery disease)     Calculus of kidney     Dizziness 3/11/2019    Gout     Heart failure [address]     with pacemaker, states transition of great vessels    Pacemaker     Stroke ([facility]     Syncope     Transposition great arteries       Social History     Socioeconomic History    Marital status:      Spouse name: Not on file    Number of children: Not on file    Years of education: Not on file    Highest education level: Not on file   Tobacco Use    Smoking status: Never Smoker    Smokeless tobacco: Never Used    Tobacco comment: advised not to start   Substance and Sexual Activity    Alcohol use: No    Drug use: Yes     Types: Marijuana    Sexual activity: Yes     Partners: Female     Birth control/protection: Condom     Allergies   Allergen Reactions    Betadine [Povidone-Iodine] Rash    Contrast Agent [Iodine] Itching     Need to pre-medicate with benadryl      No family history on file.    Current Facility-Administered Medications   Medication Dose Route Frequency    lidocaine 4 % patch 1 Patch  1 Patch TransDERmal Q24H    FLUoxetine (PROzac) capsule 20 mg  20 mg Oral DAILY    sodium chloride (NS) flush 5-40 mL  5-40 mL IntraVENous Q8H    sodium chloride (NS) flush 5-40 mL  5-40 mL IntraVENous PRN    acetaminophen (TYLENOL) tablet 650 mg  650 mg Oral Q4H PRN    Or    acetaminophen (TYLENOL) solution 650 mg  650 mg Per NG tube Q4H PRN    Or    acetaminophen (TYLENOL) suppository 650 mg  650 mg Rectal Q4H PRN    aspirin chewable tablet 81 mg  81 mg Oral DAILY    famotidine (PEPCID) tablet 20 mg  20 mg Oral Q12H    heparin (porcine) injection 5,000 Units  5,000 Units SubCUTAneous Q8H    nitroglycerin (NITROSTAT) tablet 0.4 mg  0.4 mg SubLINGual Q5MIN PRN    ondansetron (ZOFRAN) injection 4 mg  4 mg IntraVENous Q4H PRN    oxyCODONE IR (ROXICODONE) tablet 5 mg  5 mg Oral Q6H PRN        Review of Symptoms:   11 systems reviewed, negative other than as stated in the HPI        Objective:      Visit Vitals  /57 (BP 1 Location: Left arm, BP Patient Position: Standing;Post activity)   Pulse 84   Temp 97.5 °F (36.4 °C)   Resp 18   Ht 5' 7\" (1.702 m)   Wt 74.1 kg (163 lb 5.8 oz)   SpO2 98%   BMI 25.59 kg/m²       Physical:   General: thin young   Male in no acute distress  Heart: regular, no m/S3/JVD, no carotid bruits   Lungs: clear   Abdomen: Soft, +BS, NTND   Extremities: LE shane +DP/PT, no edema   Neurologic: Grossly normal    Data Review:   Recent Labs     03/11/19  0213 03/10/19  1131   WBC 4.4 5.6   HGB 12.5 14.1   HCT 37.6 41.2   PLT 87* 114*     Recent Labs     03/10/19  1231 03/10/19  1131   NA  --  139   K  --  3.9   CL  --  104   CO2  --  27   GLU  --  131*   BUN  --  39*   CREA  --  1.94*   CA  --  8.1*   ALB  --  3.7   TBILI  --  0.3   SGOT  --  29   ALT  --  24   INR 1.0  --        Recent Labs     03/11/19  0213 03/10/19  2251 03/10/19  1649 03/10/19  1131   TROIQ <0.05 <0.05 <0.05 <0.05   CPK  --  279 367* 473*       No intake or output data in the 24 hours ending 03/11/19 1020     Cardiographics    Telemetry: a paced  ECG: A paced with no acute chagnes    Echocardiogram: pending  CXRAY: no acute process       Assessment:       Principal Problem:    Dizziness (3/11/2019)    Active Problems:    Chronic chest pain (10/11/2016)      Transposition great arteries (10/11/2016)      H/O Mustard procedure (10/11/2016)      History of CVA (cerebrovascular accident) (10/11/2016)      Pacemaker (10/11/2016)      Overview: Medtronic RV lead only pacemaker      TIA (transient ischemic attack) (3/10/2019)         Plan:     Chronic CP:  Neg troponin's, CPK, ECG paced without acute changes  Echo pending  Requested records from [facility].    No further cardiac evaluation to be done here, have recommended that patient and family return to [facility]/Cardiologist for further management as his case is extremely complicated with his significant hx. Continue on ASA. PTA on BB but on hold with hypotension and symptomatic  Negative orthostatic BPs  Restarting low dose betaxolol  Monitor I/Os, daily weights, labs. LINDA:  PTA on torsemide and aldactone, on hold now  Hydrating with NS  I/Os. Nephrology consult? Thank you for consulting [facility]. Will follow with you. García Crowley NP       [facility]    3/11/2019         Patient seen, examined by me personally. Plan discussed as detailed. Agree with note as outlined by  NP. I confirm findings in history and physical exam. No additional findings noted. Agree with plan as outlined above. He has reproducible chest pain that appears musculoskeletal/pleuritic. Has chronic chest pain syndrome as well. Needs pain management. Will review records from [facility]. Previous caths have bee normal per patient. ? ranexa.     Regina Lanes, MD

## 2019-03-12 ENCOUNTER — APPOINTMENT (OUTPATIENT)
Dept: VASCULAR SURGERY | Age: 34
DRG: 069 | End: 2019-03-12
Attending: FAMILY MEDICINE
Payer: COMMERCIAL

## 2019-03-12 ENCOUNTER — APPOINTMENT (OUTPATIENT)
Dept: NON INVASIVE DIAGNOSTICS | Age: 34
DRG: 069 | End: 2019-03-12
Attending: FAMILY MEDICINE
Payer: COMMERCIAL

## 2019-03-12 ENCOUNTER — APPOINTMENT (OUTPATIENT)
Dept: CT IMAGING | Age: 34
DRG: 069 | End: 2019-03-12
Attending: PSYCHIATRY & NEUROLOGY
Payer: COMMERCIAL

## 2019-03-12 PROBLEM — G93.41 ACUTE METABOLIC ENCEPHALOPATHY: Status: ACTIVE | Noted: 2019-03-12

## 2019-03-12 PROBLEM — R94.01 ABNORMAL EEG: Status: ACTIVE | Noted: 2019-03-12

## 2019-03-12 PROBLEM — I65.23 BILATERAL CAROTID ARTERY STENOSIS: Status: ACTIVE | Noted: 2019-03-12

## 2019-03-12 PROBLEM — F11.10 OPIOID ABUSE, DAILY USE (HCC): Status: ACTIVE | Noted: 2019-03-12

## 2019-03-12 LAB
ANION GAP SERPL CALC-SCNC: 5 MMOL/L (ref 5–15)
BNP SERPL-MCNC: 296 PG/ML
BUN SERPL-MCNC: 18 MG/DL (ref 6–20)
BUN/CREAT SERPL: 14 (ref 12–20)
CALCIUM SERPL-MCNC: 6.9 MG/DL (ref 8.5–10.1)
CHLORIDE SERPL-SCNC: 102 MMOL/L (ref 97–108)
CO2 SERPL-SCNC: 30 MMOL/L (ref 21–32)
CREAT SERPL-MCNC: 1.27 MG/DL (ref 0.7–1.3)
ECHO AO ROOT DIAM: 3.27 CM
ECHO AV AREA PEAK VELOCITY: 0.6 CM2
ECHO AV AREA/BSA PEAK VELOCITY: 0.3 CM2/M2
ECHO AV CUSP MM: 0 CM
ECHO AV PEAK GRADIENT: 6.4 MMHG
ECHO AV PEAK VELOCITY: 126.73 CM/S
ECHO EST RA PRESSURE: 10 MMHG
ECHO LA MAJOR AXIS: 1.89 CM
ECHO LA TO AORTIC ROOT RATIO: 0.58
ECHO LV INTERNAL DIMENSION DIASTOLIC: 2.24 CM (ref 4.2–5.9)
ECHO LV INTERNAL DIMENSION SYSTOLIC: 1.62 CM
ECHO LV IVSD: 0.65 CM (ref 0.6–1)
ECHO LV MASS 2D: 28.6 G (ref 88–224)
ECHO LV MASS INDEX 2D: 15.4 G/M2 (ref 49–115)
ECHO LV POSTERIOR WALL DIASTOLIC: 0.84 CM (ref 0.6–1)
ECHO LV POSTERIOR WALL SYSTOLIC: 0.97 CM
ECHO LVOT DIAM: 0.92 CM
ECHO LVOT PEAK GRADIENT: 5.6 MMHG
ECHO LVOT PEAK VELOCITY: 117.88 CM/S
ECHO LVOT SV: 16.4 ML
ECHO LVOT VTI: 24.64 CM
ECHO MV A VELOCITY: 99.01 CM/S
ECHO MV AREA PHT: 12.6 CM2
ECHO MV E DECELERATION TIME (DT): 60.4 MS
ECHO MV E VELOCITY: 116.89 CM/S
ECHO MV E/A RATIO: 1.18
ECHO MV PRESSURE HALF TIME (PHT): 17.5 MS
ECHO MV REGURGITANT PEAK GRADIENT: 6.8 MMHG
ECHO MV REGURGITANT PEAK VELOCITY: 130.27 CM/S
ECHO RA VOLUME: 31.4 ML
ECHO RIGHT VENTRICULAR SYSTOLIC PRESSURE (RVSP): 103.4 MMHG
ECHO RV INTERNAL DIMENSION: 4.45 CM
ECHO TV MAX VELOCITY: 467.73 CM/S
ECHO TV PEAK GRADIENT: 87.5 MMHG
ECHO TV REGURGITANT MAX VELOCITY: 483.29 CM/S
ECHO TV REGURGITANT PEAK GRADIENT: 93.4 MMHG
GLUCOSE BLD STRIP.AUTO-MCNC: 144 MG/DL (ref 65–100)
GLUCOSE BLD STRIP.AUTO-MCNC: 200 MG/DL (ref 65–100)
GLUCOSE BLD STRIP.AUTO-MCNC: 80 MG/DL (ref 65–100)
GLUCOSE BLD STRIP.AUTO-MCNC: 97 MG/DL (ref 65–100)
GLUCOSE SERPL-MCNC: 154 MG/DL (ref 65–100)
LEFT CCA DIST DIAS: 24.6 CM/S
LEFT CCA DIST SYS: 101.4 CM/S
LEFT CCA PROX DIAS: 29 CM/S
LEFT CCA PROX SYS: 129.9 CM/S
LEFT ECA DIAS: 11.4 CM/S
LEFT ECA SYS: 77.2 CM/S
LEFT ICA DIST DIAS: 29 CM/S
LEFT ICA DIST SYS: 70.7 CM/S
LEFT ICA MID DIAS: 26.8 CM/S
LEFT ICA MID SYS: 72.9 CM/S
LEFT ICA PROX DIAS: 18 CM/S
LEFT ICA PROX SYS: 46.5 CM/S
LEFT ICA/CCA SYS: 0.7
LEFT SUBCLAVIAN DIAS: 0 CM/S
LEFT SUBCLAVIAN SYS: 108 CM/S
LEFT VERTEBRAL DIAS: 9.2 CM/S
LEFT VERTEBRAL SYS: 44.3 CM/S
POTASSIUM SERPL-SCNC: 4.1 MMOL/L (ref 3.5–5.1)
RIGHT CCA DIST DIAS: 24.6 CM/S
RIGHT CCA DIST SYS: 103.6 CM/S
RIGHT CCA PROX DIAS: 24.6 CM/S
RIGHT CCA PROX SYS: 116.7 CM/S
RIGHT ECA DIAS: 9.2 CM/S
RIGHT ECA SYS: 79.4 CM/S
RIGHT ICA DIST DIAS: 29 CM/S
RIGHT ICA DIST SYS: 75.1 CM/S
RIGHT ICA MID DIAS: 24.6 CM/S
RIGHT ICA MID SYS: 68.5 CM/S
RIGHT ICA PROX DIAS: 18 CM/S
RIGHT ICA PROX SYS: 64.1 CM/S
RIGHT ICA/CCA SYS: 0.7
RIGHT SUBCLAVIAN DIAS: 0 CM/S
RIGHT SUBCLAVIAN SYS: 125.5 CM/S
RIGHT VERTEBRAL DIAS: 20.2 CM/S
RIGHT VERTEBRAL SYS: 72.9 CM/S
SERVICE CMNT-IMP: ABNORMAL
SERVICE CMNT-IMP: ABNORMAL
SERVICE CMNT-IMP: NORMAL
SERVICE CMNT-IMP: NORMAL
SODIUM SERPL-SCNC: 137 MMOL/L (ref 136–145)
URATE SERPL-MCNC: 9.4 MG/DL (ref 3.5–7.2)
URATE UR-MCNC: 36.9 MG/DL

## 2019-03-12 PROCEDURE — 84560 ASSAY OF URINE/URIC ACID: CPT

## 2019-03-12 PROCEDURE — 65660000000 HC RM CCU STEPDOWN

## 2019-03-12 PROCEDURE — 74011250636 HC RX REV CODE- 250/636: Performed by: FAMILY MEDICINE

## 2019-03-12 PROCEDURE — 74011250637 HC RX REV CODE- 250/637: Performed by: FAMILY MEDICINE

## 2019-03-12 PROCEDURE — 70450 CT HEAD/BRAIN W/O DYE: CPT

## 2019-03-12 PROCEDURE — 76450000000

## 2019-03-12 PROCEDURE — 99218 HC RM OBSERVATION: CPT

## 2019-03-12 PROCEDURE — 80307 DRUG TEST PRSMV CHEM ANLYZR: CPT

## 2019-03-12 PROCEDURE — 36415 COLL VENOUS BLD VENIPUNCTURE: CPT

## 2019-03-12 PROCEDURE — 82962 GLUCOSE BLOOD TEST: CPT

## 2019-03-12 PROCEDURE — 94760 N-INVAS EAR/PLS OXIMETRY 1: CPT

## 2019-03-12 PROCEDURE — 93306 TTE W/DOPPLER COMPLETE: CPT

## 2019-03-12 PROCEDURE — 93880 EXTRACRANIAL BILAT STUDY: CPT

## 2019-03-12 PROCEDURE — 83880 ASSAY OF NATRIURETIC PEPTIDE: CPT

## 2019-03-12 PROCEDURE — 80048 BASIC METABOLIC PNL TOTAL CA: CPT

## 2019-03-12 PROCEDURE — 74011250637 HC RX REV CODE- 250/637: Performed by: INTERNAL MEDICINE

## 2019-03-12 PROCEDURE — 84550 ASSAY OF BLOOD/URIC ACID: CPT

## 2019-03-12 PROCEDURE — 74011636637 HC RX REV CODE- 636/637: Performed by: INTERNAL MEDICINE

## 2019-03-12 RX ORDER — PREDNISONE 20 MG/1
40 TABLET ORAL
Status: DISCONTINUED | OUTPATIENT
Start: 2019-03-12 | End: 2019-03-13 | Stop reason: HOSPADM

## 2019-03-12 RX ORDER — BETAXOLOL 10 MG/1
5 TABLET, FILM COATED ORAL DAILY
Status: DISCONTINUED | OUTPATIENT
Start: 2019-03-13 | End: 2019-03-13 | Stop reason: HOSPADM

## 2019-03-12 RX ADMIN — Medication 10 ML: at 13:09

## 2019-03-12 RX ADMIN — OXYCODONE HYDROCHLORIDE 5 MG: 5 TABLET ORAL at 23:29

## 2019-03-12 RX ADMIN — HEPARIN SODIUM 5000 UNITS: 5000 INJECTION INTRAVENOUS; SUBCUTANEOUS at 02:44

## 2019-03-12 RX ADMIN — FAMOTIDINE 20 MG: 20 TABLET ORAL at 09:52

## 2019-03-12 RX ADMIN — Medication 10 ML: at 06:49

## 2019-03-12 RX ADMIN — PREDNISONE 40 MG: 20 TABLET ORAL at 09:53

## 2019-03-12 RX ADMIN — HEPARIN SODIUM 5000 UNITS: 5000 INJECTION INTRAVENOUS; SUBCUTANEOUS at 10:50

## 2019-03-12 RX ADMIN — OXYCODONE HYDROCHLORIDE 5 MG: 5 TABLET ORAL at 18:30

## 2019-03-12 RX ADMIN — OXYCODONE HYDROCHLORIDE 5 MG: 5 TABLET ORAL at 10:49

## 2019-03-12 RX ADMIN — ASPIRIN 81 MG 81 MG: 81 TABLET ORAL at 09:53

## 2019-03-12 RX ADMIN — OXYCODONE HYDROCHLORIDE 5 MG: 5 TABLET ORAL at 14:45

## 2019-03-12 RX ADMIN — OXYCODONE HYDROCHLORIDE 5 MG: 5 TABLET ORAL at 02:42

## 2019-03-12 RX ADMIN — FLUOXETINE 20 MG: 10 CAPSULE ORAL at 09:52

## 2019-03-12 RX ADMIN — FAMOTIDINE 20 MG: 20 TABLET ORAL at 21:09

## 2019-03-12 RX ADMIN — OXYCODONE HYDROCHLORIDE 5 MG: 5 TABLET ORAL at 06:47

## 2019-03-12 RX ADMIN — SPIRONOLACTONE 25 MG: 25 TABLET ORAL at 13:08

## 2019-03-12 NOTE — PROGRESS NOTES
Chief Complaint: Altered mental status  Still complaining of vague numbness in the upper extremities as well as pain of both ankles. He has an acute attack of gout. Started on prednisone. EEG is being done. Assesment and Plan    1. Altered mental status  EEG - Has symmetric FIRDA which correlates with metabolic encephalopathies because of its symmetry it's less likely correlate with a structural lesion. Doubt that this is a cerebrovascular event. Can't get an MRI because of the pace maker. May repeat CT in the am  continue aspirin  Repeat head CT today. 2. Acute chest pain  Cardiology following        3.  Transposition great arteries  Cardiology following  S/p mustard procedure   On transplant list.     4. Hypotension, unspecified hypotension type  Baseline for him per patient.          Allergies  Betadine [povidone-iodine] and Contrast agent [iodine]     Medications  Current Facility-Administered Medications   Medication Dose Route Frequency    predniSONE (DELTASONE) tablet 40 mg  40 mg Oral DAILY WITH BREAKFAST    lidocaine 4 % patch 1 Patch  1 Patch TransDERmal Q24H    oxyCODONE IR (ROXICODONE) tablet 5 mg  5 mg Oral Q4H PRN    spironolactone (ALDACTONE) tablet 25 mg  25 mg Oral DAILY    metoprolol succinate (TOPROL-XL) XL tablet 12.5 mg  12.5 mg Oral DAILY    FLUoxetine (PROzac) capsule 20 mg  20 mg Oral DAILY    sodium chloride (NS) flush 5-40 mL  5-40 mL IntraVENous Q8H    sodium chloride (NS) flush 5-40 mL  5-40 mL IntraVENous PRN    acetaminophen (TYLENOL) tablet 650 mg  650 mg Oral Q4H PRN    Or    acetaminophen (TYLENOL) solution 650 mg  650 mg Per NG tube Q4H PRN    Or    acetaminophen (TYLENOL) suppository 650 mg  650 mg Rectal Q4H PRN    aspirin chewable tablet 81 mg  81 mg Oral DAILY    famotidine (PEPCID) tablet 20 mg  20 mg Oral Q12H    heparin (porcine) injection 5,000 Units  5,000 Units SubCUTAneous Q8H    nitroglycerin (NITROSTAT) tablet 0.4 mg  0.4 mg SubLINGual Q5MIN PRN    ondansetron (ZOFRAN) injection 4 mg  4 mg IntraVENous Q4H PRN        Medical History  Past Medical History:   Diagnosis Date    Asthma     CAD (coronary artery disease)     Calculus of kidney     Dizziness 3/11/2019    Gout     [address] [address]     with pacemaker, states transition of great vessels    Pacemaker     Stroke ([facility]     Syncope     Transposition great arteries      Review of Systems   Constitutional: Negative for chills and fever. HENT: Negative for ear pain. Eyes: Negative for pain and discharge. Respiratory: Negative for cough and hemoptysis. Cardiovascular: Negative for chest pain and claudication. Gastrointestinal: Negative for constipation and diarrhea. Genitourinary: Negative for flank pain and hematuria. Musculoskeletal: Positive for myalgias. Negative for back pain. Skin: Negative for itching and rash. Neurological: Positive for tingling and sensory change. Negative for headaches. Endo/Heme/Allergies: Negative for environmental allergies. Does not bruise/bleed easily. Psychiatric/Behavioral: Positive for memory loss. Negative for depression and hallucinations. The patient is nervous/anxious. Exam:    Visit Vitals  /50 (BP 1 Location: Left arm, BP Patient Position: At rest)   Pulse 84   Temp 97.8 °F (36.6 °C)   Resp 16   Ht 5' 7\" (1.702 m)   Wt 163 lb (73.9 kg)   SpO2 100%   BMI 25.53 kg/m²      General: Well developed, well nourished. Patient in no apparent distress   Head: Normocephalic, atraumatic, anicteric sclera   Neck Normal ROM, No thyromegally   Cardiac: Regular rate and rhythm   Ext:  Hyperemic feet tender and warm to the touch. Skin: Supple      NeurologicExam:  Mental Status: Alert and oriented to person place and time. Speech: Fluent no aphasia slow speech. Cranial Nerves:  II - XII Intact subtle right facial droop   Motor:   Subtle weakness of the right upper extremity. .   Sensory:   Symmetric and intact with no perceived deficits modalities involving small or large fibers. Tremor:   No tremor noted. Cerebellar:  Coordination intact. Imaging  CT Results (most recent):  Results from Hospital Encounter encounter on 03/10/19   CT HEAD WO CONT    Narrative INDICATION:  Dizziness and diplopia     EXAM:  HEAD CT WITHOUT CONTRAST    COMPARISON:  9/25/2017    TECHNIQUE:  Routine noncontrast axial head CT was performed. Coronal and  sagittal multiplanar reconstructions were obtained. CT dose reduction was  achieved through use of a standardized protocol tailored for this examination  and automatic exposure control for dose modulation. FINDINGS:    The ventricles and cortical sulci are within normal limits. No evidence for acute intracranial hemorrhage, midline shift, or mass effect. Unchanged bifrontal focal hypoattenuation compatible with chronic injuries. The basal cisterns are patent. The visualized paranasal sinuses and mastoid air cells are clear. No calvarial abnormality. Impression IMPRESSION:    Stable exam without evidence for acute intracranial abnormality              MRI Results (most recent):  No results found for this or any previous visit.     Lab Review  Lab Results   Component Value Date/Time    WBC 4.4 03/11/2019 02:13 AM    HCT 37.6 03/11/2019 02:13 AM    HGB 12.5 03/11/2019 02:13 AM    PLATELET 87 (L) 51/15/6409 02:13 AM       Lab Results   Component Value Date/Time    Sodium 139 03/10/2019 11:31 AM    Potassium 3.9 03/10/2019 11:31 AM    Chloride 104 03/10/2019 11:31 AM    CO2 27 03/10/2019 11:31 AM    Glucose 131 (H) 03/10/2019 11:31 AM    BUN 39 (H) 03/10/2019 11:31 AM    Creatinine 1.94 (H) 03/10/2019 11:31 AM    Calcium 8.1 (L) 03/10/2019 11:31 AM         Lab Results   Component Value Date/Time    Hemoglobin A1c 4.9 03/11/2019 02:13 AM          Lab Results   Component Value Date/Time    Cholesterol, total 94 03/11/2019 02:13 AM    HDL Cholesterol 30 03/11/2019 02:13 AM LDL, calculated 29.4 03/11/2019 02:13 AM    VLDL, calculated 34.6 03/11/2019 02:13 AM    Triglyceride 173 (H) 03/11/2019 02:13 AM    CHOL/HDL Ratio 3.1 03/11/2019 02:13 AM

## 2019-03-12 NOTE — ROUTINE PROCESS
Bedside shift change report given to Beatriz Horn RN (oncoming nurse) by Preston Daily (offgoing nurse). Report included the following information [facility].     [facility] Phone:   [phone]        Significant changes during shift:  complain of pain 7 out of 10 but refuses lidocaine patch, given 5 mg of oxycodone   Patient Information     Olive Solorio  35 y.o.  3/10/2019 11:14 AM by Leroy Fields MD. Olive Solorio was admitted from Home     Problem List          Patient Active Problem List     Diagnosis Date Noted    Dizziness 03/11/2019    TIA (transient ischemic attack) 03/10/2019    Chronic chest pain 10/11/2016    Transposition great arteries 10/11/2016    Right ventricular failure ([address] 10/11/2016    Wheezing 10/11/2016    History of DVT (deep vein thrombosis) 10/11/2016    H/O Mustard procedure 10/11/2016    History of CVA (cerebrovascular accident) 10/11/2016    Pacemaker 10/11/2016    Chronic pain 10/11/2016           Past Medical History:   Diagnosis Date    Asthma      CAD (coronary artery disease)      Calculus of kidney      Dizziness 3/11/2019    Gout      Heart failure ([address]       with pacemaker, states transition of great vessels    Pacemaker      Stroke ([address]      Syncope      Transposition great arteries              Core Measures:     CVA: Yes Yes  CHF:No No  PNA:No No     Activity Status:     OOB to Chair No  Ambulated this shift Yes   Bed Rest No     Supplemental O2: (If Applicable)     NC No  NRB No  Venti-mask No  Room air     LINES AND DRAINS:     PIV     DVT prophylaxis:     DVT prophylaxis Med- Yes - refused  DVT prophylaxis SCD or FRED- No      Wounds: (If Applicable)     Wounds- No     Location      Patient Safety:     Falls Score Total Score: 3  Safety Level_______  Bed Alarm On? No  Sitter?  No     Plan for upcoming shift: safety, family to bring in home meds, echo and carotid in AM           Discharge Plan: Yes TBD     Active Consults:  IP CONSULT TO NEUROLOGY  IP CONSULT TO NEUROLOGY  IP CONSULT TO HOSPITALIST  IP CONSULT TO CARDIOLOGY

## 2019-03-12 NOTE — PROGRESS NOTES
Spiritual Care Assessment/Progress Note  [facility]      NAME: Magan Leal      MRN: [id number]  AGE: 35 y.o.  SEX: male  Yarsani Affiliation: [employer]ian   Language: English     3/12/2019     Total Time (in minutes): 19     Spiritual Assessment begun in [facility]LE[facility] through conversation with:         [x]Patient        [] Family    [] Friend(s)        Reason for Consult: Palliative Care, Initial/Spiritual Assessment     Spiritual beliefs: (Please include comment if needed)     [x] Identifies with a leah tradition:         [] Supported by a leah community:            [] Claims no spiritual orientation:           [] Seeking spiritual identity:                [] Adheres to an individual form of spirituality:           [] Not able to assess:                           Identified resources for coping:      [] Prayer                               [] Music                  [] Guided Imagery     [x] Family/friends                 [] Pet visits     [] Devotional reading                         [] Unknown     [] Other:                                          Interventions offered during this visit: (See comments for more details)    Patient Interventions: Affirmation of emotions/emotional suffering, Coping skills reviewed/reinforced, Prayer (assurance of), Iconic (affirming the presence of God/Higher Power)     Family/Friend(s): Prayer (assurance of)     Plan of Care:     [x] Support spiritual and/or cultural needs    [] Support AMD and/or advance care planning process      [] Support grieving process   [] Coordinate Rites and/or Rituals    [] Coordination with community clergy   [x] No spiritual needs identified at this time   [] Detailed Plan of Care below (See Comments)  [] Make referral to Music Therapy  [] Make referral to Pet Therapy     [] Make referral to Addiction services  [] Make referral to [facility]  [] Make referral to Spiritual Care Partner  [] No future visits requested        [x] Follow up visits as needed     Comments: The patient was resting in bed with the TV playing quietly. The patient had a female visitor who was speaking on her phone the duration of the visit. The patient listened as I introduced myself and explained the purpose of the visit. The patient indicated that he did not desire any spiritual support at this time. The patient mentioned that he has several tests approaching. When the patient mentioned his future tests, he suggested that he may wish to see a  in the near future. The patient asked how he could get back in touch with me. I pointed out the four digit number on his chalk board and I provided the patient with one of my business cards. Rev.  Rosalia Bearden EdD MDiv  Palliative  Fellow  For [facility] Page [phone])

## 2019-03-12 NOTE — PROGRESS NOTES
[address] [facility], [facility] [address] [facility]  [address] [facility]     Date of  Admission: 3/10/2019 11:14 AM      Subjective:     Hemant Last still c/o 10/10 chest discomfort, asking for pain meds on regular basis. He initially refused echo until he received his pain medications. Also refusing to take Toprol XL (Dr. Reymundo Nelson restarted this medication, d/cd Betaxalol). Echo completed. Received information from [facility]. Patient has been seen fairly regularly since late last year. Ongoing c/o worsening CP noted, and continual recommendations for pain management clinic - supposedly he did have at one time. Toprol XL was d/c'd late Jan 2019 due to fatigue. Patient then started experiencing frequent symptomatic tachycardia. It was recommended to restart Toprol but patient insisted on using Betoxaolol instead due to less side effects. [place] states that he is not a transplant candidate due to his opiod use in inconsistent compliance with medications and f/u. Today, when talking with patient ref  where he lives was inconsistent throughout the conversation. He states that he lives near [place], but has a house here in [place], which then became 2 houses, and that he was visiting for a family function. His house in [place] is at [facility] which is no where near [place]. And he cannot recite his address there as he just purchased house. He states that he has a pain management appt here in [place] in 3 weeks, but does not know where or with who. 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 Mariaelena Carver (sp) ([phone]). Seen last week with adjustment of medications - was on Toprol, but c/o tiredness, weakness. Changed to Betaxalol. Cardiac cath at [place] 1 year ago, normal coronaries.  Patient states he is on the heart transplant list.     Medtronic pacemaker x 10 years (no RV lead, A paced only). Last week during visit at [place], saw EP who \"adjusted pacemaker\" as patient c/o \"stabbing pain\" at site of pacemaker. Pain has been relieved since. CVA several years ago, right sided weakness    Significant psych hx. Patient Active Problem List    Diagnosis Date Noted    Abnormal EEG 03/12/2019    Acute metabolic encephalopathy 98/03/0972    Opioid abuse, daily use ([address] 03/12/2019    Dizziness 03/11/2019    TIA (transient ischemic attack) 03/10/2019    Chronic chest pain 10/11/2016    Transposition great arteries 10/11/2016    Right ventricular failure ([address] 10/11/2016    Wheezing 10/11/2016    History of DVT (deep vein thrombosis) 10/11/2016    H/O Mustard procedure 10/11/2016    History of CVA (cerebrovascular accident) 10/11/2016    Pacemaker 10/11/2016    Chronic pain 10/11/2016      Norm Stark NP  Past Medical History:   Diagnosis Date    Asthma     CAD (coronary artery disease)     Calculus of kidney     Dizziness 3/11/2019    Gout     Heart failure ([address]     with pacemaker, states transition of great vessels    Opioid abuse, daily use ([address] 3/12/2019    Pacemaker     Stroke [facility])     Syncope     Transposition great arteries       Social History     Socioeconomic History    Marital status:      Spouse name: Not on file    Number of children: Not on file    Years of education: Not on file    Highest education level: Not on file   Tobacco Use    Smoking status: Never Smoker    Smokeless tobacco: Never Used    Tobacco comment: advised not to start   Substance and Sexual Activity    Alcohol use: No    Drug use: Yes     Types: Marijuana    Sexual activity: Yes     Partners: Female     Birth control/protection: Condom     Allergies   Allergen Reactions    Betadine [Povidone-Iodine] Rash    Contrast Agent [Iodine] Itching     Need to pre-medicate with benadryl      No family history on file.    Current Facility-Administered Medications   Medication Dose Route Frequency    predniSONE (DELTASONE) tablet 40 mg  40 mg Oral DAILY WITH BREAKFAST    lidocaine 4 % patch 1 Patch  1 Patch TransDERmal Q24H    oxyCODONE IR (ROXICODONE) tablet 5 mg  5 mg Oral Q4H PRN    spironolactone (ALDACTONE) tablet 25 mg  25 mg Oral DAILY    metoprolol succinate (TOPROL-XL) XL tablet 12.5 mg  12.5 mg Oral DAILY    FLUoxetine (PROzac) capsule 20 mg  20 mg Oral DAILY    sodium chloride (NS) flush 5-40 mL  5-40 mL IntraVENous Q8H    sodium chloride (NS) flush 5-40 mL  5-40 mL IntraVENous PRN    acetaminophen (TYLENOL) tablet 650 mg  650 mg Oral Q4H PRN    Or    acetaminophen (TYLENOL) solution 650 mg  650 mg Per NG tube Q4H PRN    Or    acetaminophen (TYLENOL) suppository 650 mg  650 mg Rectal Q4H PRN    aspirin chewable tablet 81 mg  81 mg Oral DAILY    famotidine (PEPCID) tablet 20 mg  20 mg Oral Q12H    heparin (porcine) injection 5,000 Units  5,000 Units SubCUTAneous Q8H    nitroglycerin (NITROSTAT) tablet 0.4 mg  0.4 mg SubLINGual Q5MIN PRN    ondansetron (ZOFRAN) injection 4 mg  4 mg IntraVENous Q4H PRN            Objective:      Visit Vitals  /60 (BP 1 Location: Left arm, BP Patient Position: At rest)   Pulse 84   Temp 98.4 °F (36.9 °C)   Resp 18   Ht 5' 7\" (1.702 m)   Wt 73.9 kg (163 lb)   SpO2 97%   BMI 25.53 kg/m²       Physical:   General: thin young   Male in no acute distress  Heart: regular, no m/S3/JVD, no carotid bruits   Lungs: clear   Abdomen: Soft, +BS, NTND   Extremities: LE shane +DP/PT, no edema   Neurologic: Grossly normal    Data Review:   Recent Labs     03/11/19  0213 03/10/19  1131   WBC 4.4 5.6   HGB 12.5 14.1   HCT 37.6 41.2   PLT 87* 114*     Recent Labs     03/12/19  1206 03/10/19  1231 03/10/19  1131     --  139   K 4.1  --  3.9     --  104   CO2 30  --  27   *  --  131*   BUN 18  --  39*   CREA 1.27  --  1.94*   CA 6.9*  --  8.1*   ALB  --   --  3.7   TBILI  -- --  0.3   SGOT  --   --  29   ALT  --   --  24   INR  --  1.0  --        Recent Labs     03/11/19  0213 03/10/19  2251 03/10/19  1649 03/10/19  1131   TROIQ <0.05 <0.05 <0.05 <0.05   CPK  --  279 367* 473*         Intake/Output Summary (Last 24 hours) at 3/12/2019 1547  Last data filed at 3/12/2019 1344  Gross per 24 hour   Intake 1060 ml   Output    Net 1060 ml        Cardiographics    Telemetry: a paced  ECG: A paced with no acute chagnes    Echocardiogram:   D-TGA s/p Mustard with dilated dysfunctional systemic RV. No obvious baffle obstruction. No significant change and seemingly stable ventricular function compared with last study seen in [facility] system       Assessment:       Principal Problem:    Dizziness (3/11/2019)    Active Problems:    Chronic chest pain (10/11/2016)      Transposition great arteries (10/11/2016)      H/O Mustard procedure (10/11/2016)      History of CVA (cerebrovascular accident) (10/11/2016)      Pacemaker (10/11/2016)      Overview: Medtronic RV lead only pacemaker      TIA (transient ischemic attack) (3/10/2019)      Abnormal EEG (3/12/2019)      Acute metabolic encephalopathy (5/96/0375)      Opioid abuse, daily use ([facility] (3/12/2019)         Plan:     Chronic CP:  Neg troponin's, CPK, ECG paced without acute changes  Echo completed, no acute changes   Appreciate Dr. Radha Kelly consult. No further cardiac evaluation to be done here, have recommended that patient and family return to [facility]/Cardiologist for further management as his case is extremely complicated with his significant hx. Continue on ASA. Restarting low dose betaxolol as patient refusing to take Toprol  Monitor I/Os, daily weights, labs. LINDA:  Restarted aldactone    Chronic Pain with opioid abuse:  Pain management clinic??  Palliative medicine to see. Discussed with Dr. Fabien Rogers Cardiology    3/12/2019         Patient seen, examined by me personally. Plan discussed as detailed.  Agree with note as outlined by  NP. I confirm findings in history and physical exam. No additional findings noted. Agree with plan as outlined above. He can f/u with advanced HF center for local care.     Regina Lanes, MD

## 2019-03-12 NOTE — ROUTINE PROCESS
Patient does not want to take metoprolol, wants betaxolol per his normal routine which he was told not to change - attempted to reach Dr. Le Ferguson who had changed meds yesterday but was unable to reach anything but office voicemail. Contacted Bernie Greene NP and notified her of above, she advised to inform patient that cardiology is recommending this change to metoprolol. She said to administer metoprolol with 12pm vital signs if systolic >74, to monitor BP and give aldactone later in the day if systolic >86.

## 2019-03-12 NOTE — PROGRESS NOTES
* No surgery found *  * No surgery found *  Bedside shift change report given to Neida Medrano (oncoming nurse) by Kelly Rivas RN (offgoing nurse). Report included the following information [facility] Phone:   [phone]      Significant changes during shift: Seen by neurologist, cardiologist, pain medications given twice      Patient Information    Kendall Ferrer  35 y.o.  3/10/2019 11:14 AM by Anthony Bruce MD. Kendall Ferrer was admitted from Home    Problem List    Patient Active Problem List    Diagnosis Date Noted    Dizziness 03/11/2019    TIA (transient ischemic attack) 03/10/2019    Chronic chest pain 10/11/2016    Transposition great arteries 10/11/2016    Right ventricular failure ([address] 10/11/2016    Wheezing 10/11/2016    History of DVT (deep vein thrombosis) 10/11/2016    H/O Mustard procedure 10/11/2016    History of CVA (cerebrovascular accident) 10/11/2016    Pacemaker 10/11/2016    Chronic pain 10/11/2016     Past Medical History:   Diagnosis Date    Asthma     CAD (coronary artery disease)     Calculus of kidney     Dizziness 3/11/2019    Gout     Heart failure ([address]     with pacemaker, states transition of great vessels    Pacemaker     Stroke ([address]     Syncope     Transposition great arteries          Core Measures:    CVA: Yes Yes  CHF:No No  PNA:No No    Activity Status:    OOB to Chair No  Ambulated this shift Yes   Bed Rest No    Supplemental O2: (If Applicable)    NC No  NRB No  Venti-mask No  Room air    LINES AND DRAINS:    PIV    DVT prophylaxis:    DVT prophylaxis Med- Yes - refused  DVT prophylaxis SCD or FRED- No     Wounds: (If Applicable)    Wounds- No    Location     Patient Safety:    Falls Score Total Score: 3  Safety Level_______  Bed Alarm On? No  Sitter?  No    Plan for upcoming shift: safety, family to bring in home meds, echo and carotid in AM        Discharge Plan: Yes TBD    Active Consults:  IP CONSULT TO NEUROLOGY  IP CONSULT TO NEUROLOGY  IP CONSULT TO HOSPITALIST  IP CONSULT TO CARDIOLOGY

## 2019-03-12 NOTE — PROGRESS NOTES
Pt a high risk for falls but ambulates to bathroom without calling for assistance, given urinal tonight will check often.

## 2019-03-12 NOTE — PROCEDURES
[place] [facility], [address]      Electroencephalogram         Procedure Date: 03/12/19      Procedure ID: [id number]    Procedure Type: Routine    Patient Name: Lakesha Mancia     YOB: 1985    Medical Record No: [id number]    INDICATION: Altered mental status    Medications:  Current Facility-Administered Medications   Medication Dose Route Frequency    predniSONE (DELTASONE) tablet 40 mg  40 mg Oral DAILY WITH BREAKFAST    lidocaine 4 % patch 1 Patch  1 Patch TransDERmal Q24H    oxyCODONE IR (ROXICODONE) tablet 5 mg  5 mg Oral Q4H PRN    spironolactone (ALDACTONE) tablet 25 mg  25 mg Oral DAILY    metoprolol succinate (TOPROL-XL) XL tablet 12.5 mg  12.5 mg Oral DAILY    FLUoxetine (PROzac) capsule 20 mg  20 mg Oral DAILY    sodium chloride (NS) flush 5-40 mL  5-40 mL IntraVENous Q8H    sodium chloride (NS) flush 5-40 mL  5-40 mL IntraVENous PRN    acetaminophen (TYLENOL) tablet 650 mg  650 mg Oral Q4H PRN    Or    acetaminophen (TYLENOL) solution 650 mg  650 mg Per NG tube Q4H PRN    Or    acetaminophen (TYLENOL) suppository 650 mg  650 mg Rectal Q4H PRN    aspirin chewable tablet 81 mg  81 mg Oral DAILY    famotidine (PEPCID) tablet 20 mg  20 mg Oral Q12H    heparin (porcine) injection 5,000 Units  5,000 Units SubCUTAneous Q8H    nitroglycerin (NITROSTAT) tablet 0.4 mg  0.4 mg SubLINGual Q5MIN PRN    ondansetron (ZOFRAN) injection 4 mg  4 mg IntraVENous Q4H PRN       DESCRIPTION OF PROCEDURE: Electrodes were applied in accordance with the international 10-20 system of electrode placement. EEG was reviewed in both bipolar and referential montages    DESCRIPTION OF FINDINGS: Background consists of symmetric  12 hz activity. This activity attenuates with eye opening. Intermittent symmetric frontal delta. With photic stimulation a symmetric occipital driving response is noted. No seizures were noted.      IMPRESSION:  Abnormal EEG.  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 This can be seen in metabolic encephalopathies as well as stroke. The latter is less likely with symmetric patterns. No seizures noted. Absence of seizures on this study does not exclude epilepsy. Clinical correlation is advised.

## 2019-03-12 NOTE — PROGRESS NOTES
* No surgery found *  * No surgery found *  Bedside shift change report given to Abner Lennox and Nawaf Palma (oncoming nurse) by Reyna Vital (offgoing nurse). Report included the following information [facility] Phone:   [phone]      Significant changes during shift:  Seen by cardiology and neurology, records received from [address] [place] [address], echo/duplex and head CT performed, states continued pain and pressure in chest and down right arm and up neck since before admission, patient states pain only partially alleviated by current meds, patient reports pain in both feet and states from gout. Patient refused metoprolol and did receive aldactone.     Patient Information    Cha Evans  35 y.o.  3/10/2019 11:14 AM by Zamzam Lopez Fátima [place] was admitted from Home    Problem List    Patient Active Problem List    Diagnosis Date Noted    Abnormal EEG 03/12/2019    Acute metabolic encephalopathy 37/09/7912    Dizziness 03/11/2019    TIA (transient ischemic attack) 03/10/2019    Chronic chest pain 10/11/2016    Transposition great arteries 10/11/2016    Right ventricular failure ([address] 10/11/2016    Wheezing 10/11/2016    History of DVT (deep vein thrombosis) 10/11/2016    H/O Mustard procedure 10/11/2016    History of CVA (cerebrovascular accident) 10/11/2016    Pacemaker 10/11/2016    Chronic pain 10/11/2016     Past Medical History:   Diagnosis Date    Asthma     CAD (coronary artery disease)     Calculus of kidney     Dizziness 3/11/2019    Gout     Heart failure ([address]     with pacemaker, states transition of great vessels    Pacemaker     Stroke ([address]     Syncope     Transposition great arteries          Core Measures:    CVA: Yes Yes  CHF:No No  PNA:No No    Activity Status:    OOB to Chair No  Ambulated this shift Yes   Bed Rest No    Supplemental O2: (If Applicable)    NC No  NRB No  Venti-mask No  Room air    LINES AND DRAINS:    PIV    DVT prophylaxis:    DVT prophylaxis Med- Yes - refused  DVT prophylaxis SCD or FRED- No     Wounds: (If Applicable)    Wounds- No    Location     Patient Safety:    Falls Score Total Score: 3  Safety Level_______  Bed Alarm On? No  Sitter?  No    Plan for upcoming shift: family to bring in home meds, monitor blood pressure, manage pain      Discharge Plan: Yes TBD    Active Consults:  IP CONSULT TO NEUROLOGY  IP CONSULT TO NEUROLOGY  IP CONSULT TO PALLIATIVE CARE - PROVIDER  IP CONSULT TO HOSPITALIST  IP CONSULT TO CARDIOLOGY

## 2019-03-12 NOTE — PHYSICIAN ADVISORY
Letter of Status Determination: Current Status   OBSERVATION is Appropriate*         Pt Name:  Lakesha Mancia   MR#  [id number]   [facility]#   [id number]   [address] [place], [place] [address]/71  @ [facility]   Hospitalization date  3/10/2019 11:14 AM   Current Attending Physician  Koffi Jenkins,*   Principal diagnosis  Dizziness    Clinicals  The patient is 19-year-old gentleman with past medical history of transposition of the great   arteries, status post procedure, history of stroke with residual affect  on the right side as an infant, chronic systolic heart failure, anxiety, sinoatrial node dysfunction, pacemaker and chronic chest pain, who presents to the hospital with the above mentioned symptoms. Pt reports theat he wok up this morning with pain in the right side of his chest along with a HA, double vision, and some \" spasms\" and weakness of right UE and LE. Pt reports he has not experienced weakness of arms or leg ever in the past. Pt reports that he is on a cardiac transplant list and  His cardiologist is at [facility]. He reports that he was at [facility] a week ago and had some \"abnormal heart beat\". Thus, his pacemaker was interrogated and  His medications were adjusted. Pt reports that he is no longer taking Metoprolol and is on a new medication ,the name of which starts with a \"B\" but does not remember the exact name. Pt had a VQ scan in ED which was low probability for PE, Pt denies any palpations, diaphoresis, N/V, radiation of the pain or exertional component associated with the CP. Pt reports that his double vision and right arm/leg weakness has resolved.  unddrgoing workup, repeat CT and ECHO,       [place] MCG criteria   Does  NOT apply    STATUS DETERMINATION  On the basis of clinical data, available documentaion, we believe that the current status of this patient as OBSERVATION is Appropriate     The final decision of the patient's hospitalization status depends on the attending physician's judgment    Additional comments     Insurance  Payor: [employer] / Plan: [address] [id number] PPO / Product Type: PPO /    [address] PPO Phone:     Subscriber: Amado Navarro Subscriber#:  [id number]    Group#:  Preccarolyne#:                    Rebeca Rachel MD  Cell: [phone]  Physician Advisor

## 2019-03-13 ENCOUNTER — APPOINTMENT (OUTPATIENT)
Dept: MRI IMAGING | Age: 34
DRG: 069 | End: 2019-03-13
Attending: FAMILY MEDICINE
Payer: COMMERCIAL

## 2019-03-13 VITALS
TEMPERATURE: 98 F | SYSTOLIC BLOOD PRESSURE: 111 MMHG | DIASTOLIC BLOOD PRESSURE: 62 MMHG | WEIGHT: 163.58 LBS | HEIGHT: 67 IN | BODY MASS INDEX: 25.67 KG/M2 | HEART RATE: 84 BPM | OXYGEN SATURATION: 94 % | RESPIRATION RATE: 18 BRPM

## 2019-03-13 LAB
GLUCOSE BLD STRIP.AUTO-MCNC: 131 MG/DL (ref 65–100)
GLUCOSE BLD STRIP.AUTO-MCNC: 175 MG/DL (ref 65–100)
SERVICE CMNT-IMP: ABNORMAL
SERVICE CMNT-IMP: ABNORMAL

## 2019-03-13 PROCEDURE — 74011250637 HC RX REV CODE- 250/637: Performed by: FAMILY MEDICINE

## 2019-03-13 PROCEDURE — 74011250637 HC RX REV CODE- 250/637: Performed by: INTERNAL MEDICINE

## 2019-03-13 PROCEDURE — 94760 N-INVAS EAR/PLS OXIMETRY 1: CPT

## 2019-03-13 PROCEDURE — 74011250637 HC RX REV CODE- 250/637: Performed by: NURSE PRACTITIONER

## 2019-03-13 PROCEDURE — 82962 GLUCOSE BLOOD TEST: CPT

## 2019-03-13 PROCEDURE — 74011636637 HC RX REV CODE- 636/637: Performed by: INTERNAL MEDICINE

## 2019-03-13 RX ORDER — OXYCODONE HYDROCHLORIDE 5 MG/1
5 TABLET ORAL
Status: DISCONTINUED | OUTPATIENT
Start: 2019-03-13 | End: 2019-03-13 | Stop reason: HOSPADM

## 2019-03-13 RX ORDER — PREDNISONE 10 MG/1
TABLET ORAL
Qty: 21 TAB | Refills: 0 | Status: SHIPPED | OUTPATIENT
Start: 2019-03-13 | End: 2020-05-28 | Stop reason: ALTCHOICE

## 2019-03-13 RX ADMIN — Medication 10 ML: at 12:54

## 2019-03-13 RX ADMIN — ASPIRIN 81 MG 81 MG: 81 TABLET ORAL at 09:57

## 2019-03-13 RX ADMIN — PREDNISONE 40 MG: 20 TABLET ORAL at 09:57

## 2019-03-13 RX ADMIN — OXYCODONE HYDROCHLORIDE 5 MG: 5 TABLET ORAL at 12:51

## 2019-03-13 RX ADMIN — SPIRONOLACTONE 25 MG: 25 TABLET ORAL at 11:23

## 2019-03-13 RX ADMIN — BETAXOLOL HYDROCHLORIDE 5 MG: 10 TABLET, COATED ORAL at 10:03

## 2019-03-13 RX ADMIN — FAMOTIDINE 20 MG: 20 TABLET ORAL at 09:57

## 2019-03-13 RX ADMIN — FLUOXETINE 20 MG: 10 CAPSULE ORAL at 09:57

## 2019-03-13 RX ADMIN — OXYCODONE HYDROCHLORIDE 5 MG: 5 TABLET ORAL at 06:45

## 2019-03-13 NOTE — PROGRESS NOTES
Chief Complaint: Altered mental status    Patient again endorses recurrent complaints of numbness of bilateral upper extremities, flushing of the face and ears and dizziness. He feels the urgent need to be transferred to [facility] and he and his mother feel that they have not received proper care despite reassuring them that they have received the proper attention and treatment from cardiology and neurology. His mother feels that Mr. Elizabeth Hair has a unique genetic makeup and that the diagnostic studies would mask serious life threatening complications. I reassured her that he is does not qualify for TPA because of the time of onset of his symptoms, all addressable stroke risk factors were evaluated and that further treatment is not currently indicated. She stated that nature of his unique cardiac condition can't be met at this facility and insisted on transfer to [facility]. I spoke with Dr. Kaleigh Cuellar at [facility] who did not feel that a transfer was necessary and that Mr. Elizabeth Hair should follow up at the transplant clinic in [facility] in two weeks. Mr. Pepe Booth mother then called [facility] transplant clinic and spoke with Dr. Alix Remy who assured her that a transfer was possible and necessary. I called the transfer center who will reach out to Dr. Wynema Lesch. Assesment and Plan    1. Altered mental status  EEG - Has symmetric FIRDA   Doubt that this is a cerebrovascular event. continue aspirin  Repeat head ct is unremarkable. LDL 29.4  HGBA1C 4.9    2. Acute chest pain  Cardiology following        3. Transposition great arteries  Cardiology following  S/p mustard procedure   On transplant list.     4. Hypotension, unspecified hypotension type  Baseline for him per patient.      D/w team, patient advocate and family. 120 minutes spent on the floor  reviewing chart,  evaluation and discussion with patient and family and coordinating care .     Allergies  Betadine [povidone-iodine] and Contrast agent [iodine] Medications  Current Facility-Administered Medications   Medication Dose Route Frequency    oxyCODONE IR (ROXICODONE) tablet 5 mg  5 mg Oral Q6H PRN    predniSONE (DELTASONE) tablet 40 mg  40 mg Oral DAILY WITH BREAKFAST    betaxolol (KERLONE) tablet 5 mg  5 mg Oral DAILY    lidocaine 4 % patch 1 Patch  1 Patch TransDERmal Q24H    spironolactone (ALDACTONE) tablet 25 mg  25 mg Oral DAILY    FLUoxetine (PROzac) capsule 20 mg  20 mg Oral DAILY    sodium chloride (NS) flush 5-40 mL  5-40 mL IntraVENous Q8H    sodium chloride (NS) flush 5-40 mL  5-40 mL IntraVENous PRN    acetaminophen (TYLENOL) tablet 650 mg  650 mg Oral Q4H PRN    Or    acetaminophen (TYLENOL) solution 650 mg  650 mg Per NG tube Q4H PRN    Or    acetaminophen (TYLENOL) suppository 650 mg  650 mg Rectal Q4H PRN    aspirin chewable tablet 81 mg  81 mg Oral DAILY    famotidine (PEPCID) tablet 20 mg  20 mg Oral Q12H    heparin (porcine) injection 5,000 Units  5,000 Units SubCUTAneous Q8H    nitroglycerin (NITROSTAT) tablet 0.4 mg  0.4 mg SubLINGual Q5MIN PRN    ondansetron (ZOFRAN) injection 4 mg  4 mg IntraVENous Q4H PRN        Medical History  Past Medical History:   Diagnosis Date    Asthma     CAD (coronary artery disease)     Calculus of kidney     Dizziness 3/11/2019    Gout     Heart failure ([facility] [address]     with pacemaker, states transition of great vessels    Opioid abuse, daily use ([facility] [address] 3/12/2019    Pacemaker     Stroke [facility])     Syncope     Transposition great arteries      Review of Systems   Constitutional: Negative for chills and fever. HENT: Negative for ear pain. Eyes: Negative for pain and discharge. Respiratory: Negative for cough and hemoptysis. Cardiovascular: Negative for chest pain and claudication. Gastrointestinal: Negative for constipation and diarrhea. Genitourinary: Negative for flank pain and hematuria. Musculoskeletal: Positive for myalgias. Negative for back pain.    Skin: Negative for itching and rash. Neurological: Positive for tingling and sensory change. Negative for headaches. Endo/Heme/Allergies: Negative for environmental allergies. Does not bruise/bleed easily. Psychiatric/Behavioral: Positive for memory loss. Negative for depression and hallucinations. The patient is nervous/anxious. Exam:    Visit Vitals  /60   Pulse 84   Temp 97.7 °F (36.5 °C)   Resp 18   Ht 5' 7\" (1.702 m)   Wt 163 lb 9.3 oz (74.2 kg)   SpO2 97%   BMI 25.62 kg/m²      General: Well developed, well nourished. Patient in no apparent distress  Eating dinner independantly   Head: Normocephalic, atraumatic, anicteric sclera   Neck Normal ROM, No thyromegally   Cardiac: Regular rate and rhythm   Ext:  Hyperemic feet tender and warm to the touch. Skin: Supple      NeurologicExam:  Mental Status: Alert and oriented to person place and time. Speech: Fluent no aphasia slow speech. Cranial Nerves:  II - XII Intact subtle right facial droop   Motor:   Subtle weakness of the right upper extremity. .   Sensory:   Symmetric and intact with no perceived deficits modalities involving small or large fibers. Tremor:   No tremor noted. Cerebellar:  Coordination intact. Imaging  CT Results (most recent):  Results from Hospital Encounter encounter on 03/10/19   CT HEAD WO CONT    Narrative EXAM: CT HEAD WO CONT    INDICATION: altered mental status acute metabolic encephalopathy    COMPARISON: March 10, 2019    CONTRAST: None. TECHNIQUE: Unenhanced CT of the head was performed using 5 mm images. Brain and  bone windows were generated. CT dose reduction was achieved through use of a  standardized protocol tailored for this examination and automatic exposure  control for dose modulation. FINDINGS:  There is no extra-axial fluid collection hemorrhage shift or masses. Periventricular white matter changes are stable. Impression  impression: No change, no acute findings.      Lab Review  Lab Results   Component Value Date/Time    WBC 4.4 03/11/2019 02:13 AM    HCT 37.6 03/11/2019 02:13 AM    HGB 12.5 03/11/2019 02:13 AM    PLATELET 87 (L) 15/76/8328 02:13 AM       Lab Results   Component Value Date/Time    Sodium 137 03/12/2019 12:06 PM    Potassium 4.1 03/12/2019 12:06 PM    Chloride 102 03/12/2019 12:06 PM    CO2 30 03/12/2019 12:06 PM    Glucose 154 (H) 03/12/2019 12:06 PM    BUN 18 03/12/2019 12:06 PM    Creatinine 1.27 03/12/2019 12:06 PM    Calcium 6.9 (L) 03/12/2019 12:06 PM       Lab Results   Component Value Date/Time    Hemoglobin A1c 4.9 03/11/2019 02:13 AM        Lab Results   Component Value Date/Time    Cholesterol, total 94 03/11/2019 02:13 AM    HDL Cholesterol 30 03/11/2019 02:13 AM    LDL, calculated 29.4 03/11/2019 02:13 AM    VLDL, calculated 34.6 03/11/2019 02:13 AM    Triglyceride 173 (H) 03/11/2019 02:13 AM    CHOL/HDL Ratio 3.1 03/11/2019 02:13 AM

## 2019-03-13 NOTE — PROGRESS NOTES
Plans for transfer to [facility] Dr Kenneth Granda noted. Will be available if needed. Discussed with Dr. Connie Hayden who is hapyy to see him in f/u.

## 2019-03-13 NOTE — PROGRESS NOTES
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, follows at [address] as above  Per patient, on heart transplant list    ?Gout  Lt foot pain  Check uric acid    Hypotension:   Per patient BP runs low end of nL  Asymptomatic now  Monitor for now, hold diuretics, cont gently IVF     Acute on Chronic Kidney injury:   -suspect prerenal  cont gentle IV hydration, renal US, trend creatinine  hold lisinopril, avoid nephrotoxic meds    Anxiety/depression  Anxious mood  Cont prozac     GI/DVT: on subq Heparin   Diet: cardiac  Code status : Full     Subjective:     Chief Complaint / Reason for Physician Visit  Pt c/o Rt foot pain, \"I hurt everywhere\"    Review of Systems:  Symptom Y/N Comments  Symptom Y/N Comments   Fever/Chills n   Chest Pain y chronic   Poor Appetite n   Edema     Cough    Abdominal Pain n    Sputum    Joint Pain y Rt foot   SOB/GOOD n   Pruritis/Rash     Nausea/vomit n   Tolerating PT/OT     Diarrhea    Tolerating Diet     Constipation    Other       Could NOT obtain due to:      Objective:     VITALS:   Last 24hrs VS reviewed since prior progress note.  Most recent are:  Patient Vitals for the past 24 hrs:   Temp Pulse Resp BP SpO2   19 2309 97.9 °F (36.6 °C) 83 18 97/58 94 %   19 1808 98.7 °F (37.1 °C) 86 18 100/57 93 %   19 1440    100/60    19 1209 98.4 °F (36.9 °C) 84 18 101/58 97 %   19 1014    100/50    19 0728 97.8 °F (36.6 °C) 84 16 108/72 100 %   03/12/19 0256 97.5 °F (36.4 °C) 85 16 104/58 99 %       Intake/Output Summary (Last 24 hours) at 3/12/2019 2343  Last data filed at 3/12/2019 1344  Gross per 24 hour   Intake 480 ml   Output    Net 480 ml        PHYSICAL EXAM:  General: WD, WN. Alert, cooperative, no acute distress    EENT:  EOMI. Anicteric sclerae. MMM  Resp:  CTA bilaterally, no wheezing or rales. No accessory muscle use  CV:  Regular  rhythm,  No edema  GI:  Soft, Non distended, Non tender.  +Bowel sounds  Neurologic:  Alert and oriented X 3, normal speech,   Psych:   Moderately anxious  Skin:  No rashes. No jaundice    Reviewed most current lab test results and cultures  YES  Reviewed most current radiology test results   YES  Review and summation of old records today    NO  Reviewed patient's current orders and MAR    YES  PMH/SH reviewed - no change compared to H&P  ________________________________________________________________________  Care Plan discussed with:    Comments   Patient x    Family  x    RN x    Care Manager     Consultant                       x Multidiciplinary team rounds were held today with , nursing, pharmacist and clinical coordinator. Patient's plan of care was discussed; medications were reviewed and discharge planning was addressed. ________________________________________________________________________  Total NON critical care TIME:  30   Minutes    Total CRITICAL CARE TIME Spent:   Minutes non procedure based      Comments   >50% of visit spent in counseling and coordination of care x    ________________________________________________________________________  Baby Embs, DO     Procedures: see electronic medical records for all procedures/Xrays and details which were not copied into this note but were reviewed prior to creation of Plan. LABS:  I reviewed today's most current labs and imaging studies.   Pertinent labs include:  Recent Labs     03/11/19  0213 03/10/19  1131   WBC 4.4 5.6   HGB 12.5 14.1   HCT 37.6 41.2   PLT 87* 114*     Recent Labs     03/12/19  1206 03/10/19  1231 03/10/19  1131     --  139   K 4.1  --  3.9     --  104   CO2 30  --  27   *  --  131*   BUN 18  --  39*   CREA 1.27  --  1.94*   CA 6.9*  --  8.1*   ALB  --   --  3.7   TBILI  --   --  0.3   SGOT  --   --  29   ALT  --   --  24   INR  --  1.0  --        Signed: Duane Porter DO

## 2019-03-13 NOTE — PROGRESS NOTES
* No surgery found *  * No surgery found *  Bedside shift change report given to [address] (oncoming nurse) by Barbara Hamm (offgoing nurse). Report included the following information [facility] Phone:   [phone]      Significant changes during shift: Intermittent blurred vision DR Nichols was aware  Patient Information    Adra Ada  35 y.o.  3/10/2019 11:14 AM by Ban Pruett Salima Boogie was admitted from Home    Problem List    Patient Active Problem List    Diagnosis Date Noted    Abnormal EEG 03/12/2019    Acute metabolic encephalopathy 82/54/3960    Opioid abuse, daily use ([address] 03/12/2019    Dizziness 03/11/2019    TIA (transient ischemic attack) 03/10/2019    Chronic chest pain 10/11/2016    Transposition great arteries 10/11/2016    Right ventricular failure ([address] 10/11/2016    Wheezing 10/11/2016    History of DVT (deep vein thrombosis) 10/11/2016    H/O Mustard procedure 10/11/2016    History of CVA (cerebrovascular accident) 10/11/2016    Pacemaker 10/11/2016    Chronic pain 10/11/2016     Past Medical History:   Diagnosis Date    Asthma     CAD (coronary artery disease)     Calculus of kidney     Dizziness 3/11/2019    Gout     Heart failure ([address]     with pacemaker, states transition of great vessels    Opioid abuse, daily use ([address] 3/12/2019    Pacemaker     Stroke ([address]     Syncope     Transposition great arteries          Core Measures:    CVA: Yes Yes  CHF:No No  PNA:No No    Activity Status:    OOB to Chair No  Ambulated this shift Yes   Bed Rest No    Supplemental O2: (If Applicable)    NC No  NRB No  Venti-mask No  Room air    LINES AND DRAINS:    PIV    DVT prophylaxis:    DVT prophylaxis Med- Yes - refused  DVT prophylaxis SCD or FRED- No     Wounds: (If Applicable)    Wounds- No    Location     Patient Safety:    Falls Score Total Score: 3  Safety Level_______  Bed Alarm On? No  Sitter?  No    Plan for upcoming shift: family to bring in home meds, monitor blood pressure, manage pain      Discharge Plan: Yes TBD    Active Consults:  IP CONSULT TO NEUROLOGY  IP CONSULT TO NEUROLOGY  IP CONSULT TO PALLIATIVE CARE - PROVIDER  IP CONSULT TO HOSPITALIST  IP CONSULT TO CARDIOLOGY

## 2019-03-13 NOTE — PROGRESS NOTES
Spoke with patient and mother and she still wants patient to transfer to [facility]. Informed her payment is not guaranteed if transferred and she understands. Neurologist awaiting call back from the md at [facility].

## 2019-03-13 NOTE — PROGRESS NOTES
Patients mother at nurses station yelling \"I need you in his room now! \" went to patient's room where he is lying quietly. When asked what was wrong. he sated his hands felt shaky and his ears are burning and his vision feels blurred. His mother is yelling \"This is what he was like when we brought him in\". Dr Maulik Krueger notified of above and stated he would be up to see patient but was still planning on discharging him later today. TA sent in to check VS  systolic. TA stated patient said that is high for me something is wrong! \"Mother is yelling\" I Want a cardi logist in here\". I spoke with [place] cardiology NP and notified her of above. Went in give patients his scheduled meds. Mother is saying she wasn't neurologist in room. Dr Félix Correa called and notified of above. 1145. Dr Maulik Krueger in room.  Patient advocate on unit

## 2019-03-13 NOTE — PROGRESS NOTES
Spoke with nursing and Dr Fremean Neighbours spoke with Pyle and then spoke with patient and the mother. Per nursing patient being discharged and mother is transporting. Phil Shape with [facility] and informed him patient has left and to cancel the will call.

## 2019-03-13 NOTE — DISCHARGE SUMMARY
Hospitalist Discharge Summary     Patient ID:  Fabiola Lundborg  [id number]  76 y.o.  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 Pt reports theat he wok up this morning with pain in the right side of his chest along with a HA, double vision, and some \" spasms\" and weakness of right UE and LE. Pt reports he has not experienced weakness of arms or leg ever in the past. Pt reports that he is on a cardiac transplant list and  His cardiologist is at [facility]. He reports that he was at [facility] a week ago and had some \"abnormal heart beat\". Thus, his pacemaker was interrogated and  His medications were adjusted. Pt reports that he is no longer taking Metoprolol and is on a new medication ,the name of which starts with a \"B\" but does not remember the exact name. Pt had a VQ scan in ED which was low probability for PE, Pt denies any palpations, diaphoresis, N/V, radiation of the pain or exertional component associated with the CP. Pt reports that his double vision and right arm/leg weakness has resolved now. The patient also denies any weight gain or shortness of breath with his symptoms.  The patient also denies any headache, sore throat, trouble swallowing, trouble with speech, any ightheadedness, dizziness, abdominal pain, constipation, diarrhea, urinary symptoms, fever, chills,other focal generalized neurological weakness, recent travel, sick contacts or any other concerns or problems. ______________________________________________________________________  DISCHARGE SUMMARY/HOSPITAL COURSE:  for full details see H&P, daily progress notes, labs, consult notes. TIA:   Admitted w Rt sided numbness, transient diplopia, both resolved  Check MRI if possible (has pacemaker), echo, lipids, A1C, vascular studies  Reported Hx of CVA in youth  Neuro consult appreciated  CT head negative for acute process x 2 on 3/10 and 3/12 (no MRI d/t pacemaker)           Chest pain  Chronic systolic CHF NYHA class II  CHD/TGA  Atypical, reproducible  Cardio consult appreciated, awaiting records from his cardiologist at [facility], tele  Continue spironolactone, betaxolol  trops nL x 3  EKG atrially paced  Complicated cardiac Hx with CHD, transposition of great arteries s/p childhood surgical repair, follows at [facility] as above  To follow up outpatient with his CHD specialist at [facility]     Gout  Lt foot pain  Check uric acid  To complete prednisone taper  Outpatient f/u with PCP to consider UA lowering agent     Hypotension POA  resolved  Per patient BP runs low end of nL  Asymptomatic now  Monitor for now, hold diuretics, received gentleIVF     Acute on Chronic Kidney injury:   suspect prerenal  cont gentle IV hydration, renal US, trend creatinine  hold lisinopril, avoid nephrotoxic meds  Cr trending down, appears to be at approximate baseline (Cr 1.27 on day of DC)     Anxiety/depression  Anxious mood  Cont prozac and buspar  Severe anxiety has been on benzos in the past  Complicated by treatment of ADHD with stimulant medication, may benefit from exploring alternative treatments for the ADHD    _______________________________________________________________________  Patient seen and examined by me on discharge day.   Pertinent Findings:  Gen:    Not in distress  Chest: Clear lungs  CVS:   Regular rhythm. No edema  Abd:  Soft, not distended, not tender  Neuro:  Alert, oriented x 3  _______________________________________________________________________  DISCHARGE MEDICATIONS:   Current Discharge Medication List      START taking these medications    Details   predniSONE (DELTASONE) 10 mg tablet Take 4 tabs with breakfast for 3 days, then take 2 tabs for 3 days, then take 1 tab for 3 days, then discontinue  Qty: 21 Tab, Refills: 0         CONTINUE these medications which have NOT CHANGED    Details   busPIRone (BUSPAR) 30 mg tablet Take 0.5 Tabs by mouth three (3) times daily. Indications: Generalized Anxiety Disorder  Qty: 45 Tab, Refills: 0      FLUoxetine (PROZAC) 20 mg capsule Take 1 Cap by mouth daily. Qty: 30 Cap, Refills: 1      ondansetron (ZOFRAN ODT) 4 mg disintegrating tablet Take 1 Tab by mouth every eight (8) hours as needed for Nausea. Qty: 10 Tab, Refills: 0      metoprolol succinate (TOPROL-XL) 50 mg XL tablet Take 1 Tab by mouth daily. Qty: 90 Tab, Refills: 0      spironolactone (ALDACTONE) 25 mg tablet Take 1 Tab by mouth daily. Qty: 30 Tab, Refills: 1         STOP taking these medications       torsemide (DEMADEX) 10 mg tablet Comments:   Reason for Stopping:               My Recommended Diet, Activity, Wound Care, and follow-up labs are listed in the patient's Discharge Insturctions which I have personally completed and reviewed.     ______________________________________________________________________    Risk of deterioration: Low    Condition at Discharge:  Stable  ______________________________________________________________________    Disposition  DC home, outpatient follow-up at [address]  ______________________________________________________________________    Care Plan discussed with:   Patient, Family, RN, Care Manager, Consultant    ______________________________________________________________________    Code Status: Full Code  ______________________________________________________________________      Follow up with:   PCP : Mario Whalen NP  Follow-up Information     Follow up With Specialties Details Why Contact Info    Mario Whalen NP Nurse Practitioner On 3/21/2019 Please follow up on March 21, 2019 at 10:00  [address] [phone]      Edi Valadez MD Neurology Go on 4/9/2019 Please follow up on April 9, 2019 at 11:40 arriving 15 minutes early to register with photo ID, insurance card and current list of medication  [address]  [facility]  [facility]  [phone]                Total time in minutes spent coordinating this discharge (includes going over instructions, follow-up, prescriptions, and preparing report for sign off to her PCP) :  45 minutes    Signed:  Ban Pruett DO

## 2019-03-13 NOTE — PROGRESS NOTES
Reason for Admission:   Patient came to ed for right sided numbness and diplopia. He lives in one story home with his mother. 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 He states he was independent prior to coming to the hospital. He drives. He is on the transplant list at [facility] for heart. Konrad Diaz RRAT Score:    3                 Plan for utilizing home health:    He states he has not used home health services or rehab in the past.                       Likelihood of Readmission:  Low                         Transition of Care Plan:   Patient plans to follow up with his medical team when discharged. He will return home with his mother when medically stable. He states md informed him he was still awaiting testing . Care Management Interventions  PCP Verified by CM: Yes  Transition of Care Consult (CM Consult): Discharge Planning  Physical Therapy Consult: Yes  Occupational Therapy Consult: Yes  Speech Therapy Consult: Yes  Current Support Network:  Other(His mother lives with him. )  Confirm Follow Up Transport: Family  Plan discussed with Pt/Family/Caregiver: Yes  Discharge Location  Discharge Placement: Home

## 2019-03-13 NOTE — PROGRESS NOTES
Received a call back from [facility] ([facility] center) who had reached out to Dr. Heather Torres with the adult congenital heart specialty clinic at [facility] who informed Peggy through his nurse that he would not accept Mr. Rich Burkett for transfer and defers to Dr. Sangita Britton who also had declined transfer to [facility]. I informed the patient and his mother who did not have any further requests or questions.

## 2019-03-13 NOTE — PROGRESS NOTES
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 Gout  Lt foot pain  Check uric acid  To complete prednisone taper  Outpatient f/u with PCP to consider UA lowering agent     Hypotension:   Per patient BP runs low end of nL  Asymptomatic now  Monitor for now, hold diuretics, cont gently IVF     Acute on Chronic Kidney injury:   -suspect prerenal  cont gentle IV hydration, renal US, trend creatinine  hold lisinopril, avoid nephrotoxic meds  Cr trending down, appears to be at approximate baseline (Cr 1.27 on day of DC)     Anxiety/depression  Anxious mood  Cont prozac and buspar  Severe anxiety has been on benzos in the past  Complicated by treatment of ADHD with stimulant medication, may benefit from exploring alternative treatments for the ADHD     GI/DVT: on subq Heparin   Diet: cardiac  Code status : Full     Subjective:     Chief Complaint / Reason for Physician Visit  No new complaints    Discussed with RN events overnight.      Review of Systems:  Symptom Y/N Comments  Symptom Y/N Comments   Fever/Chills n   Chest Pain y    Poor Appetite n   Edema n    Cough n   Abdominal Pain n    Sputum n   Joint Pain SOB/GOOD    Pruritis/Rash     Nausea/vomit    Tolerating PT/OT     Diarrhea    Tolerating Diet     Constipation    Other       Could NOT obtain due to:      Objective:     VITALS:   Last 24hrs VS reviewed since prior progress note. Most recent are:  Patient Vitals for the past 24 hrs:   Temp Pulse Resp BP SpO2   03/12/19 2309 97.9 °F (36.6 °C) 83 18 97/58 94 %   03/12/19 1808 98.7 °F (37.1 °C) 86 18 100/57 93 %   03/12/19 1440    100/60    03/12/19 1209 98.4 °F (36.9 °C) 84 18 101/58 97 %   03/12/19 1014    100/50    03/12/19 0728 97.8 °F (36.6 °C) 84 16 108/72 100 %   03/12/19 0256 97.5 °F (36.4 °C) 85 16 104/58 99 %       Intake/Output Summary (Last 24 hours) at 3/12/2019 2344  Last data filed at 3/12/2019 1344  Gross per 24 hour   Intake 480 ml   Output    Net 480 ml        PHYSICAL EXAM:  General: WD, WN. Alert, cooperative, no acute distress    EENT:  EOMI. Anicteric sclerae. MMM  Resp:  CTA bilaterally, no wheezing or rales. No accessory muscle use  CV:  Regular  rhythm,  No edema  GI:  Soft, Non distended, Non tender.  +Bowel sounds  Neurologic:  Alert and oriented X 3, normal speech,   Psych:   Not anxious nor agitated  Skin:  No rashes. No jaundice    Reviewed most current lab test results and cultures  YES  Reviewed most current radiology test results   YES  Review and summation of old records today    NO  Reviewed patient's current orders and MAR    YES  PMH/SH reviewed - no change compared to H&P  ________________________________________________________________________  Care Plan discussed with:    Comments   Patient x    Family      RN x    Care Manager     Consultant                        Multidiciplinary team rounds were held today with , nursing, pharmacist and clinical coordinator. Patient's plan of care was discussed; medications were reviewed and discharge planning was addressed.      ________________________________________________________________________  Total NON critical care TIME:  25   Minutes    Total CRITICAL CARE TIME Spent:   Minutes non procedure based      Comments   >50% of visit spent in counseling and coordination of care x    ________________________________________________________________________  Ian Barry DO     Procedures: see electronic medical records for all procedures/Xrays and details which were not copied into this note but were reviewed prior to creation of Plan. LABS:  I reviewed today's most current labs and imaging studies.   Pertinent labs include:  Recent Labs     03/11/19  0213 03/10/19  1131   WBC 4.4 5.6   HGB 12.5 14.1   HCT 37.6 41.2   PLT 87* 114*     Recent Labs     03/12/19  1206 03/10/19  1231 03/10/19  1131     --  139   K 4.1  --  3.9     --  104   CO2 30  --  27   *  --  131*   BUN 18  --  39*   CREA 1.27  --  1.94*   CA 6.9*  --  8.1*   ALB  --   --  3.7   TBILI  --   --  0.3   SGOT  --   --  29   ALT  --   --  24   INR  --  1.0  --        Signed: Ian Barry DO

## 2019-03-14 LAB
ACETONE SERPL-MCNC: NEGATIVE % (ref 0–0.01)
AMITRIP SERPL-MCNC: NORMAL NG/ML
APAP SERPL-MCNC: NORMAL UG/ML (ref 10–30)
BUTALBITAL SERPL-MCNC: NORMAL UG/ML (ref 1–10)
CHLORDIAZEP SERPL-MCNC: NORMAL UG/ML (ref 0.1–0.9)
DESIPRAMINE SERPL-MCNC: NORMAL NG/ML
DIAZEPAM SERPL-MCNC: NORMAL UG/ML (ref 0.1–0.9)
DOXEPIN SERPL-MCNC: NORMAL NG/ML
ETHANOL BLD GC-MCNC: NEGATIVE % (ref 0–0.01)
IMIPRAMINE SERPL-MCNC: NORMAL NG/ML
ISOPROPANOL SERPL-MCNC: NEGATIVE % (ref 0–0.01)
METHANOL SERPL-MCNC: NEGATIVE % (ref 0–0.01)
NORCHLORDIAZEP SERPL-MCNC: NORMAL UG/ML (ref 0.1–0.6)
NORDIAZEPAM SERPL-MCNC: NORMAL UG/ML (ref 0.1–1.4)
NORDOXEPIN SERPL-MCNC: NORMAL NG/ML
NORTRIP SERPL-MCNC: NORMAL NG/ML (ref 50–150)
PENTOBARB SERPL-MCNC: NORMAL UG/ML (ref 1–5)
PHENOBARB SERPL-MCNC: NORMAL UG/ML (ref 15–40)
PHENYTOIN SERPL-MCNC: NORMAL UG/ML (ref 10–20)
SALICYLATES SERPL SCN-MCNC: NORMAL UG/ML (ref 30–250)

## 2019-03-15 NOTE — ADT AUTH CERT NOTES
Transient Ischemic Attack (TIA) - Care Day 2 (3/11/2019) by Brunilda oMser RN        Review Entered Review Status   3/12/2019 08:09 Completed      Criteria Review      Care Day: 2 Care Date: 3/11/2019 Level of Care:    Guideline Day 2    Clinical Status   (X) * Hemodynamic stability   3/12/2019 08:09:09 EDT by Rayo Sen     VS: 98.1, 82, 18, 93/58, 99%      (X) * Blood pressure acceptable   3/12/2019 08:09:09 EDT by Rayo Sen     93/58      (X) * Resolution of presenting signs and symptoms   3/12/2019 08:09:09 EDT by Rayo Sen     none noted      (X) * No TIA recurrence or new neurologic deficit   3/12/2019 08:09:09 EDT by Rayo Sen     none noted      ( ) * Etiology requiring inpatient treatment absent   (X) * No bleeding secondary to antithrombotic medication   3/12/2019 08:09:09 EDT by Sudhir Staton      ( ) * Discharge plans and education understood      Activity   (X) * Ambulatory   3/12/2019 08:09:09 EDT by Sabine George with assist,         Routes   (X) * Oral hydration, medications, and diet   3/12/2019 08:09:09 EDT by Rayo Sen     Pepcid 20mg po bid, Prozac 20mg po qd,oxycodone 5mg po prn x 5,      (X) Usual diet   3/12/2019 08:09:09 EDT by Rayo Sen     cardiac reg diet,         Medications   (X) * Anticoagulation arranged for next level of care, if indicated   3/12/2019 08:09:09 EDT by Rayo Sen     asa 81mg po qd,      (X) Antiplatelet therapy   9/57/7591 08:09:09 EDT by Rayo Sen     asa 81mg po qd,      (X) Possible antihypertensive medication   3/12/2019 08:09:09 EDT by Rayo Sen     betaxolol 5mg po qd,         3/12/2019 08:09:09 EDT by Rayo Sen   Subject: Additional Clinical Information   3/11/19     LOC: OBS Neuro/Stroke     VS: 98.1, 82, 18, 93/58, 99%     Abnl labs: RBC 3.88, plt 87, triglyc 173,      Meds: heparin 5000u sc x 1, ofirmev 1000mg iv x 1,      Plan: cardiac monitoring, daily weight, EEG, I/O, neuro/vasc checks, oximetry spot check, glucose monitoring,       cardiology: Anjali Francisco is a 35 y.o. male admitted for TIA (transient ischemic attack) [G45.9]. Admitted with right sided chest pain, HA, double vision, and right arm and hand spasms. States that he has chronic CP, but the other symptoms were \"new\".   He continues with 9/10 CP, pointing below xyphoid process, SOB, dizziness/lightheadedness, double vision, syncope.     Mother at bedside mentions strong family hx of heart disease, and \"he probably needs a cath\".   Admission to [facility] in 2017 with similar complaints.   Patient states that he is scheduled for a Pain Management clinic at [facility] in 3 weeks. ECG A paced, no other acute changes.   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 Colleen Kong (sp) ([phone]). Seen last week with adjustment of medications - was on Toprol, but c/o tiredness, weakness.   Changed to Betaxalol. Cardiac cath at [facility] 1 year ago, normal coronaries. Patient states he is on the heart transplant list.   Medtronic pacemaker x 10 years (no RV lead, A paced only).   Last week during visit at [facility], saw EP who \"adjusted pacemaker\" as patient c/o \"stabbing pain\" at site of pacemaker.   Pain has been relieved since. Celia Florentino several years ago, right sided weakness  Significant psych hx.  Cardiac risk factors: family history, sedentary life style, male gender.  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   No further cardiac evaluation to be done here, have recommended that patient and family return to [facility] Clinic/Cardiologist for further management as his case is extremely complicated with his significant hx. Continue on ASA.   PTA on BB but on hold with hypotension and symptomaticNegative orthostatic BPsRestarting low dose betaxololMonitor I/Os, daily weights, labs.  LINDA:PTA on torsemide and aldactone, on hold nowHydrating with NSI/Os.   Nephrology consult? HF Cardiology: 35y.o. 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 Betha Cordell He underwent evaluation including CT of his head which revealed no new deficits.      He has been followed by both the [facility] and [facility] adult congenital heart clinics but is now being followed at [facility] by Dr. Vee Faustin in the Adult [facility]. His CPX revealed significant myocardial dysfunction with MvO2 11.8 but his RHC revealed normal filling pressures and high cardiac output.   By the patient's report, he recently underwent a Holter monitor at [facility] and had adjustments to his pacemaker. This records are not available on Care Everywhere.  The double vision and upper extremity spasms have resolved. He continues to have chronic atypical chest pain. He denies orthopnea, PND, edema. He admits to palpitations, presyncope and syncope.  He notes dyspnea and fatigue with moderate exertion.  Impression / Plan: Heart Failure Status: NYHA Class II  1. CHD - TGA s/p Mustard (RV is systemic ventricle) with systemic ventricular failure and systemic AV valve regurgitation                     On Toprol XL, spironolactone, torsemide as outpatient - held on admission                     CPX with mVO2 11.8 but RHC with normal CO and PCWP                     Resume low doseToprol XL, spironolactone                     Hold torsemide                     Echo - request peds cardiology to read                     Check NT proBNP                     Recommend outpatient CPX                     Would happily follow him in the [facility] and coordinate care with [facility]                     2. Sinoatrial Dysfunction s/p atrial pacemaker (Medtronic)                     Interrogate pacer  3. Syncope                     Evaluated at [facility]                     Obtain recent Holter results  4. Depression/Anxiety                     On prozac and buspar as outpatient                     Continue prozac                      Would benefit from cognitive based therapy  5. Gout                     Check uric acid  6. Chronic Pain Syndrome                     History of seeking narcotics                     Dismissed from [facility] for violation of medication agreement on November 9, 2015                     Comprehensive drug screen  7. History of CVA as Infant with residual right sided deficits                     No evidence of new CVA                     Started on ASA 81 mg daily           Neuro: This is a 35 y.o. male with a medical history of seizures,   transposition of great arteries and stroke. He is admitted for confusion and sensory change affecting upper limbs. Also endorse dizziness. This has been ongoing for three days. His mother who is with him states that has not been himself. He has a history of febrile seizures and was on dilantin till age [de-identified]. He has not had any convulsions.  He has been recently place on the heart transplant list. He has residual left upper extremity weakness from a remote stroke.    ASSESSMENT AND PLAN   1. Altered mental statusEEGDoubt that this is a cerebrovascular event. Can't get an MRI because of the pace maker. May repeat CT in the amcontinue aspirin  2. Acute chest painCardiology following    3. Transposition great arteriesCardiology following  4. Hypotension, unspecified hypotension typeBaseline for him per patient.                              * Milestone         Transient Ischemic Attack (TIA) - Care Day 1 (3/10/2019) by Gray Alvarado RN        Review Entered Review Status   3/11/2019 09:05 Completed      Criteria Review      Care Day: 1 Care Date: 3/10/2019 Level of Care:    Guideline Day 1    Level Of Care   (X) Floor, intermediate care, or telemetry care[C]   3/11/2019 09:05:32 EDT by Guanako Olvera     neuro/stroke         Clinical Status   (X) * Clinical Indications met[D]   3/11/2019 09:05:32 EDT by Guanako Olvera     met         Activity   (X) As tolerated   3/11/2019 09:05:32 EDT by Myra Patel with assist,         Routes   (X) Oral hydration   3/11/2019 09:05:32 EDT by Guanako Olvera     cardiac reg diet,      (X) Diet as tolerated   3/11/2019 09:05:32 EDT by Guanako Olvera     cardiac reg diet,         Interventions   (X) Possible cardiac evaluation (eg, echocardiogram)   3/11/2019 09:05:32 EDT by Guanako Olvera     VQ Scan: Agree with preliminary report. Low probability for pulmonary  embolism. Asymmetric diminished left lung ventilation, nonspecific.   EKG: Atrial-paced rhythm with prolonged AV conduction   Right bundle branch block , plus right ventricular hypert      (X) Possible arterial and head imaging   3/11/2019 09:05:32 EDT by Guanako Olvera     CT Head: Stable exam without evidence for acute intracranial abnormality         Medications   (X) Possible anticoagulation[E]   3/11/2019 09:05:32 EDT by Guanako Olvera     heparin 5000u sc q8h,         3/11/2019 09:05:32 EDT by Guanako Olvera   Subject: Additional Clinical Information   3/10/19     LOC: OBS Neuro/Stroke     VS: 97.8, 86, 18, 101/64, 98%     Meds: Tylenol 650mg po prn x 1, Pepcid 20mg po bid, Zofran 4mg iv prn x 3, oxycodone 5mg po prn x 3, NS 250ml bolus iv x 1, NS 500ml bolus iv x 1, NS 75ml/hr iv cont,      Plan: IVF, IV antiemetics, MRI Brain ordered, cardiac monitoring, daily weight, duplex carotid bilateral ordered, echo adult ordered,  I/O, neuro/vasc checks,    IM: Assessment:  Active Problems:TIA (transient ischemic attack) (3/10/2019)Chest painHypotensionAcute on Chronic Kidney InjuryHTNH/O transposition of great vesselsH/OCVAChronic systolic CHF NYHA class IIS/p Pacemaker implantation          Plan:  1. TIA: admit to neuro floor, Patient with LINDA so may not be possible to obtain a CTA head/neck, Pt also with a pacemaker so MRI may not be possible, start Pt on   ASA 81 mg, neurovascular checks per protocol, PT, OT, speech consult, await neurology input, Carotid duplex, 2 D ECHO, Lipid Panel, any changes in neurological status will mandate emergent intervention,   2. Chest pain: Currently resolved, will cycle cardiac enzymes, ASA, telemetry monitoring, NTG for pain, if symptoms persist may consider further intervention and diagnostics. Will obtain Cardiology consult, monitor. Pt recently had his meds changed, he is not every sure of what he is taking , will obtain records from [facility] to restart home meds.  3. Hypotension: ? Hypovolemic, will hold diuretics and anti hypertensive for now, gentle IV hydration, no signs of sepsis, closely monitor, if persists may consider further intervention and diagnostics.  4. Acute on Chronic Kidney injury: ? Prerenal, gentle IV hydration, renal US, trend creatinine, hold lisinopril, avoid nephrotoxic meds, renally dose other meds, may consider renal consult if creatinine continues to rise, UA, monitor 5.  Chronic systolic CHF NYHA class II: no signs of exacerbation, monitor, strict I/O and daily wt.  6. H/O transposition of great vessels: s/p multiple surgeries, currently being treated at [facility], on cardiac transplant list   GI/DVT: on subq Heparin Diet: cardiacCode status : FullDisposition: probably home tomorrow                            * Milestone         Transient Ischemic Attack (TIA) - Clinical Indications for Admission to Inpatient Care by Grey Walsh RN        Review Entered Review Status   3/11/2019 08:49 Completed      Criteria Review      Clinical Indications for Admission to Inpatient Care   Most Recent : Juanis Madrid Most Recent Date: 3/11/2019 08:49:52 EDT   (X) Admission is indicated for1 or more of the following(1)(2)(3)(4)(5)(6)(7):      (X) Focal neurologic signs or symptoms persist or recur.      3/11/2019 08:49:52 EDT by Taz Escamilla  Pt reports theat he wok up this morning with pain in the right side of his chest along with a HA, double vision, and some \" spasms\" and weakness of right UE and LE. Notes:       3/11/2019 08:49:52 EDT by Juanis Madrid   Subject: Additional Clinical Information   3/10/19   The patient is 71-year-old gentleman with past medical history of transposition of the great arteries, status post procedure, history of stroke with residual affect   on the right side as an infant, chronic systolic heart failure, anxiety, sinoatrial node dysfunction, pacemaker and chronic chest pain, who presents to the hospital with the above mentioned symptoms. Pt reports theat he wok up this morning with pain in the right side of his chest along with a HA, double vision, and some \" spasms\" and weakness of right UE and LE. Pt reports he has not experienced weakness of arms or leg ever in the past. Pt reports that he is on a cardiac transplant list and   His cardiologist is at [facility]. He reports that he was at [facility] a week ago and had some \"abnormal heart beat\". Thus, his pacemaker was interrogated and   His medications were adjusted. Pt reports that he is no longer taking Metoprolol and is on a new medication ,the name of which starts with a \"B\" but does not remember the exact name.  Pt had a VQ scan in ED which was low probability for PE, Pt denies any palpations, diaphoresis, N/V, radiation of the pain or exertional component associated with the CP. Pt reports that his double vision and right arm/leg weakness has resolved now. The patient also denies any weight gain or shortness of breath with his symptoms. The patient also denies any headache, sore throat, trouble swallowing, trouble with speech, any ightheadedness, dizziness, abdominal pain, constipation, diarrhea, urinary symptoms, fever, chills,other focal generalized neurological weakness, recent travel, sick contacts or any other concerns or problems. LOC: OBS Neuro/Stroke     VS: 97.8, 86, 18, 101/64, 98%     Abnl labs: plt 114, d-dimer 3.77, Gluc 131, BUN 39, creat 1.94, ca 8.1, , ,      CT Head: Stable exam without evidence for acute intracranial abnormality     VQ Scan: Agree with preliminary report. Low probability for pulmonary     embolism. Asymmetric diminished left lung ventilation, nonspecific. EKG: Atrial-paced rhythm with prolonged AV conduction      Right bundle branch block , plus right ventricular hypertrophy      Inferior infarct (cited on or before 19-MAY-2018)      T wave abnormality, consider lateral ischemia                  H&P Notes      H&P by Velma Taveras MD at 03/10/19 4798 documented on ED to Hosp-Admission (Discharged) from 3/10/2019 in [facility] [address]     Author: Velma Taveras MD Author Type: Physician Filed: 03/10/19 6211   Note Status: Signed Cosign: Cosign Not Required Date of Service: 03/10/19 4540   : Velma Taveras MD (Physician)   Expand All Collapse All                                         History & Physical     Primary Care Provider: Jose Miguel Middleton NP  Source of Information: Patient          History of Presenting Illness:     The patient is 80-year-old gentleman with past medical history of transposition of the great   arteries, status post procedure, history of stroke with residual affect  on the right side as an infant, chronic systolic heart failure, anxiety, sinoatrial node dysfunction, pacemaker and chronic chest pain, who presents to the hospital with the above mentioned symptoms. Pt reports theat he wok up this morning with pain in the right side of his chest along with a HA, double vision, and some \" spasms\" and weakness of right UE and LE. Pt reports he has not experienced weakness of arms or leg ever in the past. Pt reports that he is on a cardiac transplant list and  His cardiologist is at [facility]. He reports that he was at [facility] a week ago and had some \"abnormal heart beat\". Thus, his pacemaker was interrogated and  His medications were adjusted. Pt reports that he is no longer taking Metoprolol and is on a new medication ,the name of which starts with a \"B\" but does not remember the exact name. Pt had a VQ scan in ED which was low probability for PE, Pt denies any palpations, diaphoresis, N/V, radiation of the pain or exertional component associated with the CP. Pt reports that his double vision and right arm/leg weakness has resolved now. The patient also denies any weight gain or shortness of breath with his symptoms.  The patient also denies any headache, sore throat, trouble swallowing, trouble with speech, any ightheadedness, dizziness, abdominal pain, constipation, diarrhea, urinary symptoms, fever, chills,other focal generalized neurological weakness, recent travel, sick contacts or any other concerns or problems.                  Review of Systems:  A comprehensive review of systems was negative except for that written in the History of Present Illness.           Past Medical History:   Diagnosis Date    Asthma      CAD (coronary artery disease)      Calculus of kidney      Gout      Heart failure ([address]       with pacemaker, states transition of great vessels    Pacemaker      Stroke [address] [address]      Syncope      Transposition great arteries   Past Surgical History:   Procedure Laterality Date    HX PACEMAKER                  Prior to Admission medications    Medication Sig Start Date End Date Taking? Authorizing Provider   busPIRone (BUSPAR) 30 mg tablet Take 0.5 Tabs by mouth three (3) times daily. Indications: Generalized Anxiety Disorder 10/16/18     Floydene Liming, NP   FLUoxetine (PROZAC) 20 mg capsule Take 1 Cap by mouth daily. 9/17/18     Joaoydene ANDREY Valentine   torsemide (DEMADEX) 10 mg tablet Take 10 mg by mouth daily.       Miranda Wood MD   ondansetron (ZOFRAN ODT) 4 mg disintegrating tablet Take 1 Tab by mouth every eight (8) hours as needed for Nausea. 8/6/18     Rosi Sheppard MD   metoprolol succinate (TOPROL-XL) 50 mg XL tablet Take 1 Tab by mouth daily. 12/5/17     Gordon PINEDA NP   spironolactone (ALDACTONE) 25 mg tablet Take 1 Tab by mouth daily. 10/10/17     Hari Cazares NP            Allergies   Allergen Reactions    Betadine [Povidone-Iodine] Rash    Contrast Agent [Iodine] Itching       Need to pre-medicate with benadryl      No family history on file.      SOCIAL HISTORY:  Patient resides:  Independently x   Assisted Living     SNF     With family care         Smoking history:   None x   Former     Chronic        Alcohol history:   None x   Social     Chronic        Ambulates:   Independently x   w/cane     w/walker     w/wc     CODE STATUS:  DNR     Full     Other        Objective:      Physical Exam:      Visit Vitals  BP 91/47   Pulse 85   Temp 97.8 °F (36.6 °C)   Resp 14   Ht 5' 7\" (1.702 m)   Wt 74.1 kg (163 lb 5.8 oz)   SpO2 96%   BMI 25.59 kg/m²      O2 Device: Room air     General:  Alert, cooperative, no distress, appears stated age. Head:  Normocephalic, without obvious abnormality, atraumatic. Eyes:  Conjunctivae/corneas clear. PERRL, EOMI   Nose: Nares normal. Septum midline.  Mucosa normal   Throat: Lips, mucosa, and tongue normal. Teeth normal   Neck: Supple, symmetrical, trachea midline, no adenopathy   Back:   Symmetric,ROM normal. No CVA tenderness. Lungs:   Clear to auscultation bilaterally. Chest wall:  No tenderness or deformity. Heart:  Regular rate and rhythm, S1, S2 normal   Abdomen:   Soft, non-tender. Bowel sounds normal. No masses,  No organomegaly. Extremities: Extremities normal, atraumatic, no cyanosis or edema. Pulses: 2+ and symmetric all extremities. Skin: Skin color, texture, turgor normal. No rashes or lesions   Neurologic: CNII-XII intact.  Strength 5/5 x 4, DTR 1+ x 4, sensory grossly wnl             EKG:  {Atrial paved, non specific ST changes     Data Review:      Recent Days:      Recent Labs     03/10/19  1131   WBC 5.6   HGB 14.1   HCT 41.2   *      Recent Labs     03/10/19  1231 03/10/19  1131   NA  --  139   K  --  3.9   CL  --  104   CO2  --  27   GLU  --  131*   BUN  --  39*   CREA  --  1.94*   CA  --  8.1*   ALB  --  3.7   SGOT  --  29   ALT  --  24   INR 1.0  --       No results for input(s): PH, PCO2, PO2, HCO3, FIO2 in the last 72 hours.     24 Hour Results:  Recent Results          Recent Results (from the past 24 hour(s))   EKG, 12 LEAD, INITIAL     Collection Time: 03/10/19 11:21 AM   Result Value Ref Range     Ventricular Rate 84 BPM     Atrial Rate 84 BPM     P-R Interval 218 ms     QRS Duration 108 ms     Q-T Interval 382 ms     QTC Calculation (Bezet) 451 ms     Calculated R Axis -168 degrees     Calculated T Axis 26 degrees     Diagnosis           Atrial-paced rhythm with prolonged AV conduction  Right bundle branch block , plus right ventricular hypertrophy  Inferior infarct (cited on or before 19-MAY-2018)  T wave abnormality, consider lateral ischemia  When compared with ECG of 06-AUG-2018 15:48,  No significant change was found      CBC WITH AUTOMATED DIFF     Collection Time: 03/10/19 11:31 AM   Result Value Ref Range     WBC 5.6 4.1 - 11.1 K/uL     RBC 4.34 4.10 - 5.70 M/uL     HGB 14.1 12.1 - 17.0 g/dL     HCT 41.2 36.6 - 50.3 %     MCV 94.9 80.0 - 99.0 FL     MCH 32.5 26.0 - 34.0 PG     MCHC 34.2 30.0 - 36.5 g/dL     RDW 12.8 11.5 - 14.5 %     PLATELET 239 (L) 438 - 400 K/uL     MPV 11.6 8.9 - 12.9 FL     NRBC 0.0 0  WBC     ABSOLUTE NRBC 0.00 0.00 - 0.01 K/uL     NEUTROPHILS 63 32 - 75 %     LYMPHOCYTES 25 12 - 49 %     MONOCYTES 9 5 - 13 %     EOSINOPHILS 2 0 - 7 %     BASOPHILS 1 0 - 1 %     IMMATURE GRANULOCYTES 0 0.0 - 0.5 %     ABS. NEUTROPHILS 3.5 1.8 - 8.0 K/UL     ABS. LYMPHOCYTES 1.4 0.8 - 3.5 K/UL     ABS. MONOCYTES 0.5 0.0 - 1.0 K/UL     ABS. EOSINOPHILS 0.1 0.0 - 0.4 K/UL     ABS. BASOPHILS 0.0 0.0 - 0.1 K/UL     ABS. IMM. GRANS. 0.0 0.00 - 0.04 K/UL     DF AUTOMATED     METABOLIC PANEL, COMPREHENSIVE     Collection Time: 03/10/19 11:31 AM   Result Value Ref Range     Sodium 139 136 - 145 mmol/L     Potassium 3.9 3.5 - 5.1 mmol/L     Chloride 104 97 - 108 mmol/L     CO2 27 21 - 32 mmol/L     Anion gap 8 5 - 15 mmol/L     Glucose 131 (H) 65 - 100 mg/dL     BUN 39 (H) 6 - 20 MG/DL     Creatinine 1.94 (H) 0.70 - 1.30 MG/DL     BUN/Creatinine ratio 20 12 - 20       GFR est AA 49 (L) >60 ml/min/1.73m2     GFR est non-AA 40 (L) >60 ml/min/1.73m2     Calcium 8.1 (L) 8.5 - 10.1 MG/DL     Bilirubin, total 0.3 0.2 - 1.0 MG/DL     ALT (SGPT) 24 12 - 78 U/L     AST (SGOT) 29 15 - 37 U/L     Alk.  phosphatase 67 45 - 117 U/L     Protein, total 7.2 6.4 - 8.2 g/dL     Albumin 3.7 3.5 - 5.0 g/dL     Globulin 3.5 2.0 - 4.0 g/dL     A-G Ratio 1.1 1.1 - 2.2     TROPONIN I     Collection Time: 03/10/19 11:31 AM   Result Value Ref Range     Troponin-I, Qt. <0.05 <0.05 ng/mL   CK     Collection Time: 03/10/19 11:31 AM   Result Value Ref Range      (H) 39 - 308 U/L   D DIMER     Collection Time: 03/10/19 12:31 PM   Result Value Ref Range     D-dimer 3.77 (H) 0.00 - 0.65 mg/L FEU   PROTHROMBIN TIME + INR     Collection Time: 03/10/19 12:31 PM   Result Value Ref Range     INR 1.0 0.9 - 1.1       Prothrombin time 10.0 9.0 - 11.1 sec          Imaging:      Assessment:      Active Problems:  TIA (transient ischemic attack) (3/10/2019)  Chest pain  Hypotension  Acute on Chronic Kidney Injury  HTN  H/O transposition of great vessels  H/OCVA  Chronic systolic CHF NYHA class II  S/p Pacemaker implantation                  Plan:      1. TIA: admit to neuro floor, Patient with LINDA so may not be possible to obtain a CTA head/neck, Pt also with a pacemaker so MRI may not be possible, start Pt on  ASA 81 mg, neurovascular checks per protocol, PT, OT, speech consult, await neurology input, Carotid duplex, 2 D ECHO, Lipid Panel, any changes in neurological status will mandate emergent intervention,      2. Chest pain: Currently resolved, will cycle cardiac enzymes, ASA, telemetry monitoring, NTG for pain, if symptoms persist may consider further intervention and diagnostics. Will obtain Cardiology consult, monitor. Pt recently had his meds changed, he is not every sure of what he is taking , will obtain records from [facility] to restart home meds.     3. Hypotension: ? Hypovolemic, will hold diuretics and anti hypertensive for now, gentle IV hydration, no signs of sepsis, closely monitor, if persists may consider further intervention and diagnostics.     4. Acute on Chronic Kidney injury: ? Prerenal, gentle IV hydration, renal US, trend creatinine, hold lisinopril, avoid nephrotoxic meds, renally dose other meds, may consider renal consult if creatinine continues to rise, UA, monitor   5.  Chronic systolic CHF NYHA class II: no signs of exacerbation, monitor, strict I/O and daily wt.     6. 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

## 2019-05-30 ENCOUNTER — APPOINTMENT (OUTPATIENT)
Dept: GENERAL RADIOLOGY | Age: 34
End: 2019-05-30
Attending: EMERGENCY MEDICINE
Payer: COMMERCIAL

## 2019-05-30 ENCOUNTER — HOSPITAL ENCOUNTER (EMERGENCY)
Age: 34
Discharge: HOME | End: 2019-05-30
Attending: EMERGENCY MEDICINE
Payer: COMMERCIAL

## 2019-05-30 VITALS
OXYGEN SATURATION: 96 % | TEMPERATURE: 98.7 F | HEIGHT: 67 IN | HEART RATE: 85 BPM | BODY MASS INDEX: 24.33 KG/M2 | RESPIRATION RATE: 14 BRPM | WEIGHT: 155 LBS | DIASTOLIC BLOOD PRESSURE: 81 MMHG | SYSTOLIC BLOOD PRESSURE: 128 MMHG

## 2019-05-30 DIAGNOSIS — G89.29 CHRONIC CHEST PAIN: Primary | ICD-10-CM

## 2019-05-30 DIAGNOSIS — F11.20 UNCOMPLICATED OPIOID DEPENDENCE (HCC): ICD-10-CM

## 2019-05-30 DIAGNOSIS — R07.9 CHRONIC CHEST PAIN: Primary | ICD-10-CM

## 2019-05-30 LAB
ALBUMIN SERPL-MCNC: 4 G/DL (ref 3.5–5)
ALBUMIN/GLOB SERPL: 1.4 {RATIO} (ref 1.1–2.2)
ALP SERPL-CCNC: 70 U/L (ref 45–117)
ALT SERPL-CCNC: 28 U/L (ref 12–78)
ANION GAP SERPL CALC-SCNC: 7 MMOL/L (ref 5–15)
AST SERPL-CCNC: 21 U/L (ref 15–37)
ATRIAL RATE: 84 BPM
BASOPHILS # BLD: 0 K/UL (ref 0–0.1)
BASOPHILS NFR BLD: 0 % (ref 0–1)
BILIRUB SERPL-MCNC: 0.6 MG/DL (ref 0.2–1)
BUN SERPL-MCNC: 18 MG/DL (ref 6–20)
BUN/CREAT SERPL: 17 (ref 12–20)
CALCIUM SERPL-MCNC: 8 MG/DL (ref 8.5–10.1)
CALCULATED R AXIS, ECG10: 146 DEGREES
CALCULATED T AXIS, ECG11: 17 DEGREES
CHLORIDE SERPL-SCNC: 114 MMOL/L (ref 97–108)
CO2 SERPL-SCNC: 20 MMOL/L (ref 21–32)
COMMENT, HOLDF: NORMAL
CREAT SERPL-MCNC: 1.03 MG/DL (ref 0.7–1.3)
DIAGNOSIS, 93000: NORMAL
DIFFERENTIAL METHOD BLD: ABNORMAL
EOSINOPHIL # BLD: 0 K/UL (ref 0–0.4)
EOSINOPHIL NFR BLD: 0 % (ref 0–7)
ERYTHROCYTE [DISTWIDTH] IN BLOOD BY AUTOMATED COUNT: 13.5 % (ref 11.5–14.5)
GLOBULIN SER CALC-MCNC: 2.9 G/DL (ref 2–4)
GLUCOSE SERPL-MCNC: 103 MG/DL (ref 65–100)
HCT VFR BLD AUTO: 36.9 % (ref 36.6–50.3)
HGB BLD-MCNC: 12.8 G/DL (ref 12.1–17)
IMM GRANULOCYTES # BLD AUTO: 0 K/UL (ref 0–0.04)
IMM GRANULOCYTES NFR BLD AUTO: 0 % (ref 0–0.5)
LIPASE SERPL-CCNC: 98 U/L (ref 73–393)
LYMPHOCYTES # BLD: 1.3 K/UL (ref 0.8–3.5)
LYMPHOCYTES NFR BLD: 21 % (ref 12–49)
MCH RBC QN AUTO: 32.4 PG (ref 26–34)
MCHC RBC AUTO-ENTMCNC: 34.7 G/DL (ref 30–36.5)
MCV RBC AUTO: 93.4 FL (ref 80–99)
MONOCYTES # BLD: 0.5 K/UL (ref 0–1)
MONOCYTES NFR BLD: 7 % (ref 5–13)
NEUTS SEG # BLD: 4.4 K/UL (ref 1.8–8)
NEUTS SEG NFR BLD: 72 % (ref 32–75)
NRBC # BLD: 0 K/UL (ref 0–0.01)
NRBC BLD-RTO: 0 PER 100 WBC
P-R INTERVAL, ECG05: 210 MS
PLATELET # BLD AUTO: 146 K/UL (ref 150–400)
PMV BLD AUTO: 11.6 FL (ref 8.9–12.9)
POTASSIUM SERPL-SCNC: 3.3 MMOL/L (ref 3.5–5.1)
PROT SERPL-MCNC: 6.9 G/DL (ref 6.4–8.2)
Q-T INTERVAL, ECG07: 396 MS
QRS DURATION, ECG06: 106 MS
QTC CALCULATION (BEZET), ECG08: 467 MS
RBC # BLD AUTO: 3.95 M/UL (ref 4.1–5.7)
SAMPLES BEING HELD,HOLD: NORMAL
SODIUM SERPL-SCNC: 141 MMOL/L (ref 136–145)
TROPONIN I SERPL-MCNC: <0.05 NG/ML
VENTRICULAR RATE, ECG03: 84 BPM
WBC # BLD AUTO: 6.2 K/UL (ref 4.1–11.1)

## 2019-05-30 PROCEDURE — 74011250636 HC RX REV CODE- 250/636: Performed by: EMERGENCY MEDICINE

## 2019-05-30 PROCEDURE — 93005 ELECTROCARDIOGRAM TRACING: CPT

## 2019-05-30 PROCEDURE — 96374 THER/PROPH/DIAG INJ IV PUSH: CPT

## 2019-05-30 PROCEDURE — 71045 X-RAY EXAM CHEST 1 VIEW: CPT

## 2019-05-30 PROCEDURE — 96375 TX/PRO/DX INJ NEW DRUG ADDON: CPT

## 2019-05-30 PROCEDURE — 80053 COMPREHEN METABOLIC PANEL: CPT

## 2019-05-30 PROCEDURE — 84484 ASSAY OF TROPONIN QUANT: CPT

## 2019-05-30 PROCEDURE — 74011250637 HC RX REV CODE- 250/637: Performed by: EMERGENCY MEDICINE

## 2019-05-30 PROCEDURE — 74011250636 HC RX REV CODE- 250/636

## 2019-05-30 PROCEDURE — 36415 COLL VENOUS BLD VENIPUNCTURE: CPT

## 2019-05-30 PROCEDURE — 74011250637 HC RX REV CODE- 250/637

## 2019-05-30 PROCEDURE — 99284 EMERGENCY DEPT VISIT MOD MDM: CPT

## 2019-05-30 PROCEDURE — 83690 ASSAY OF LIPASE: CPT

## 2019-05-30 PROCEDURE — 85025 COMPLETE CBC W/AUTO DIFF WBC: CPT

## 2019-05-30 RX ORDER — KETOROLAC TROMETHAMINE 30 MG/ML
15 INJECTION, SOLUTION INTRAMUSCULAR; INTRAVENOUS
Status: COMPLETED | OUTPATIENT
Start: 2019-05-30 | End: 2019-05-30

## 2019-05-30 RX ORDER — POTASSIUM CHLORIDE 750 MG/1
TABLET, FILM COATED, EXTENDED RELEASE ORAL
Status: COMPLETED
Start: 2019-05-30 | End: 2019-05-30

## 2019-05-30 RX ORDER — FENTANYL CITRATE 50 UG/ML
50 INJECTION, SOLUTION INTRAMUSCULAR; INTRAVENOUS
Status: COMPLETED | OUTPATIENT
Start: 2019-05-30 | End: 2019-05-30

## 2019-05-30 RX ORDER — POTASSIUM CHLORIDE 20 MEQ/1
40 TABLET, EXTENDED RELEASE ORAL
Status: COMPLETED | OUTPATIENT
Start: 2019-05-30 | End: 2019-05-30

## 2019-05-30 RX ORDER — KETOROLAC TROMETHAMINE 30 MG/ML
INJECTION, SOLUTION INTRAMUSCULAR; INTRAVENOUS
Status: DISPENSED
Start: 2019-05-30 | End: 2019-05-30

## 2019-05-30 RX ORDER — FENTANYL CITRATE 50 UG/ML
INJECTION, SOLUTION INTRAMUSCULAR; INTRAVENOUS
Status: DISCONTINUED
Start: 2019-05-30 | End: 2019-05-30 | Stop reason: HOSPADM

## 2019-05-30 RX ADMIN — POTASSIUM CHLORIDE 40 MEQ: 750 TABLET, EXTENDED RELEASE ORAL at 03:19

## 2019-05-30 RX ADMIN — POTASSIUM CHLORIDE 40 MEQ: 20 TABLET, EXTENDED RELEASE ORAL at 02:57

## 2019-05-30 RX ADMIN — FENTANYL CITRATE 50 MCG: 50 INJECTION, SOLUTION INTRAMUSCULAR; INTRAVENOUS at 03:02

## 2019-05-30 RX ADMIN — KETOROLAC TROMETHAMINE 15 MG: 30 INJECTION, SOLUTION INTRAMUSCULAR at 02:24

## 2019-05-30 NOTE — ED TRIAGE NOTES
Assumed pt care EMS. Pt began with chest pain around 2230. When awaking had nausea, weakness, and reports loss of consciousness. When waking was aware chest pain was continuing, called EMS. In route was given nitroglycerin 0.4mg and ASA 324mg with no relief of pain. 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 Sees Dr. Ryan Hirsch at [facility] for cardiac.

## 2019-05-30 NOTE — DISCHARGE INSTRUCTIONS
Patient Education   List of Pain Management Specialists:    Poli Pompa M.D. [phone] Pain Management  Geeta Alejandro M.D. [phone] Physical Medicine and Lionel Tom M.D. [phone] Physical Medicine and Noam Reyes M.D. [phone] Pain Management  Justen Conte M.D. [phone] Pain Management    Dr. Rachell Valladares, [facility] [address]                                         [address] [facility], [address] [place], [address]  [phone]    Pain Management Center                            LILI Hudson MD DO  [address]                               [address] [facility], [address]  [phone]                                                  [phone]    Dr. Rhesa Kayser Dr. Marland Osgood. [address]                                     [place] [phone]                                                                            Dr. Judith Hill, [facility] [address] [facility]  [address] [facility]                                           [address], [address] [place] [address] [address]  www. [employer]                                            [phone] Dr. Ferro Monday      Dr. Roland Rangel, [address]  [place], [facility] [address]       [phone]              Dr. Rebecca Walshcinderick Tobar, [address] [facility]  21       Dr. Tristan Ham  [address] [facility], Jose Red. [address] [facility]. Jaret Costa 31, [address] [address]  [phone]  [phone]     Please also consider natural alternatives to pain relief such as physical therapy, massage therapy, acupuncture, chiropractors, reflexology, and trigger point injections. Chronic Pain: Care Instructions  Your Care Instructions    Chronic pain is pain that lasts a long time (months or even years) and may or may not have a clear cause. It is different from acute pain, which usually does have a clear cause--like an injury or illness--and gets better over time. Chronic pain:  · Lasts over time but may vary from day to day. · Does not go away despite efforts to end it. · May disrupt your sleep and lead to fatigue. · May cause depression or anxiety. · May make your muscles tense, causing more pain. · Can disrupt your work, hobbies, home life, and relationships with friends and family. Chronic pain is a very real condition. It is not just in your head. Treatment can help and usually includes several methods used together, such as medicines, physical therapy, exercise, and other treatments. Learning how to relax and changing negative thought patterns can also help you cope. Chronic pain is complex.  Taking an active role in your treatment will help you better manage your pain. Tell your doctor if you have trouble dealing with your pain. You may have to try several things before you find what works best for you. Follow-up care is a key part of your treatment and safety. Be sure to make and go to all appointments, and call your doctor if you are having problems. It's also a good idea to know your test results and keep a list of the medicines you take. How can you care for yourself at home? · Pace yourself. Break up large jobs into smaller tasks. Save harder tasks for days when you have less pain, or go back and forth between hard tasks and easier ones. Take rest breaks. · Relax, and reduce stress. Relaxation techniques such as deep breathing or meditation can help. · Keep moving. Gentle, daily exercise can help reduce pain over the long run. Try low- or no-impact exercises such as walking, swimming, and stationary biking. Do stretches to stay flexible. · Try heat, cold packs, and massage. · Get enough sleep. Chronic pain can make you tired and drain your energy. Talk with your doctor if you have trouble sleeping because of pain. · Think positive. Your thoughts can affect your pain level. Do things that you enjoy to distract yourself when you have pain instead of focusing on the pain. See a movie, read a book, listen to music, or spend time with a friend. · If you think you are depressed, talk to your doctor about treatment. · Keep a daily pain diary. Record how your moods, thoughts, sleep patterns, activities, and medicine affect your pain. You may find that your pain is worse during or after certain activities or when you are feeling a certain emotion. Having a record of your pain can help you and your doctor find the best ways to treat your pain. · Take pain medicines exactly as directed. ? If the doctor gave you a prescription medicine for pain, take it as prescribed.   ? If you are not taking a prescription pain medicine, ask your doctor if you can take an over-the-counter medicine. Reducing constipation caused by pain medicine  · Include fruits, vegetables, beans, and whole grains in your diet each day. These foods are high in fiber. · Drink plenty of fluids, enough so that your urine is light yellow or clear like water. If you have kidney, heart, or liver disease and have to limit fluids, talk with your doctor before you increase the amount of fluids you drink. · If your doctor recommends it, get more exercise. Walking is a good choice. Bit by bit, increase the amount you walk every day. Try for at least 30 minutes on most days of the week. · Schedule time each day for a bowel movement. A daily routine may help. Take your time and do not strain when having a bowel movement. When should you call for help? Call your doctor now or seek immediate medical care if:    · Your pain gets worse or is out of control.     · You feel down or blue, or you do not enjoy things like you once did. You may be depressed, which is common in people with chronic pain. Depression can be treated.     · You have vomiting or cramps for more than 2 hours.    Watch closely for changes in your health, and be sure to contact your doctor if:    · You cannot sleep because of pain.     · You are very worried or anxious about your pain.     · You have trouble taking your pain medicine.     · You have any concerns about your pain medicine.     · You have trouble with bowel movements, such as:  ? No bowel movement in 3 days. ? Blood in the anal area, in your stool, or on the toilet paper. ? Diarrhea for more than 24 hours. Where can you learn more? Go to http://mj-niels.info/. Enter N004 in the search box to learn more about \"Chronic Pain: Care Instructions. \"  Current as of: Jessica 3, 2018  Content Version: 11.9  © 0621-3794 [employer].  Care instructions adapted under license by [employer] (which disclaims liability or warranty for this information). If you have questions about a medical condition or this instruction, always ask your healthcare professional. [place] [address] any warranty or liability for your use of this information.

## 2019-05-30 NOTE — ED NOTES
Medicated as per EMAR. Dr. Jane Muniz has reviewed discharge instructions with the patient. The patient verbalized understanding. Pt. A&Ox4, respirations even and unlabored. VS stable as noted in flowsheet. Declined wheelchair assist from department; paperwork in hand.

## 2019-05-30 NOTE — ED PROVIDER NOTES
EMERGENCY DEPARTMENT HISTORY AND PHYSICAL EXAM      Date: 5/30/2019  Patient Name: Sherry Arriaga  Patient Age and Sex: 35 y.o. male     History of Presenting Illness     Chief Complaint   Patient presents with    Chest Pain       History Provided By: Patient    HPI: Sherry Arriaga is a 80-year-old male with a history of asthma, CAD, heart failure, transposition of the great arteries, opioid abuse, presenting for chest pain. Patient states that an hour prior to arrival started to have chest pressure and sharp pains in his left chest as well as in his epigastric region radiating to the left side. Patient states it was associated with nausea, diarrhea, constipation, shortness of breath, fatigue, numbness of the right side. Patient states that the pain is a 10 out of 10. Patient states that he takes hydrocodone as well as Percocet at home which is prescribed by a cardiologist at [facility]. Patient states that all of his history and cardiology records are [facility] but he has seen at [facility] here. When asked, pt States he is planning on seeing pain management with the [facility] in the upcoming weeks. There are no other complaints, changes, or physical findings at this time. PCP: Dee Blackburn NP    No current facility-administered medications on file prior to encounter. Current Outpatient Medications on File Prior to Encounter   Medication Sig Dispense Refill    predniSONE (DELTASONE) 10 mg tablet Take 4 tabs with breakfast for 3 days, then take 2 tabs for 3 days, then take 1 tab for 3 days, then discontinue 21 Tab 0    busPIRone (BUSPAR) 30 mg tablet Take 0.5 Tabs by mouth three (3) times daily. Indications: Generalized Anxiety Disorder 45 Tab 0    FLUoxetine (PROZAC) 20 mg capsule Take 1 Cap by mouth daily. 30 Cap 1    ondansetron (ZOFRAN ODT) 4 mg disintegrating tablet Take 1 Tab by mouth every eight (8) hours as needed for Nausea.  10 Tab 0    metoprolol succinate (TOPROL-XL) 50 mg XL tablet Take 1 Tab by mouth daily. 90 Tab 0    spironolactone (ALDACTONE) 25 mg tablet Take 1 Tab by mouth daily. 27 Tab 1       Past History     Past Medical History:  Past Medical History:   Diagnosis Date    Asthma     CAD (coronary artery disease)     transposition of cardiac arteries    Calculus of kidney     Dizziness 3/11/2019    Gout     Heart failure ([facility]     with pacemaker, states transition of great vessels    Opioid abuse, daily use ([facility] 3/12/2019    Pacemaker     [facility]     x2 in 2019 with right sided weakness.  Syncope     Transposition great arteries        Past Surgical History:  Past Surgical History:   Procedure Laterality Date    CARDIAC SURG PROCEDURE UNLIST      in 195 for transposition of the cardiac arteries    HX PACEMAKER         Family History:  History reviewed. No pertinent family history. Social History:  Social History     Tobacco Use    Smoking status: Never Smoker    Smokeless tobacco: Never Used    Tobacco comment: advised not to start   Substance Use Topics    Alcohol use: No    Drug use: Yes     Types: Marijuana       Allergies: Allergies   Allergen Reactions    Betadine [Povidone-Iodine] Rash    Contrast Agent [Iodine] Itching     Need to pre-medicate with benadryl         Review of Systems   Review of Systems   Constitutional: Positive for chills and diaphoresis. Negative for fever. Respiratory: Positive for cough and shortness of breath. Cardiovascular: Positive for chest pain. Gastrointestinal: Positive for abdominal pain, constipation, diarrhea, nausea and vomiting. Genitourinary: Negative for dysuria. Neurological: Positive for weakness, light-headedness and numbness. All other systems reviewed and are negative. Physical Exam   Physical Exam   Constitutional: He is oriented to person, place, and time. He appears well-developed and well-nourished. No distress.    Patient is perseverating about pain is rubbing his left chest   HENT: Head: Normocephalic and atraumatic. Eyes: Conjunctivae and EOM are normal.   Neck: Normal range of motion. Neck supple. Cardiovascular: Normal rate and regular rhythm. Pulmonary/Chest: Effort normal and breath sounds normal. No respiratory distress. He exhibits tenderness. Abdominal: Soft. He exhibits no distension. There is tenderness. Musculoskeletal: Normal range of motion. Neurological: He is alert and oriented to person, place, and time. Skin: Skin is warm and dry. Psychiatric: He has a normal mood and affect. Nursing note and vitals reviewed.        Diagnostic Study Results     Labs -     Recent Results (from the past 12 hour(s))   EKG, 12 LEAD, INITIAL    Collection Time: 05/30/19  1:33 AM   Result Value Ref Range    Ventricular Rate 84 BPM    Atrial Rate 84 BPM    P-R Interval 210 ms    QRS Duration 106 ms    Q-T Interval 396 ms    QTC Calculation (Bezet) 467 ms    Calculated R Axis 146 degrees    Calculated T Axis 17 degrees    Diagnosis       Atrial-paced rhythm with prolonged AV conduction  Incomplete right bundle branch block , plus right ventricular hypertrophy  Cannot rule out Inferior infarct (cited on or before 19-MAY-2018)  When compared with ECG of 10-MAR-2019 11:21,  Questionable change in initial forces of Inferior leads     TROPONIN I    Collection Time: 05/30/19  1:42 AM   Result Value Ref Range    Troponin-I, Qt. <0.05 <0.05 ng/mL   CBC WITH AUTOMATED DIFF    Collection Time: 05/30/19  1:42 AM   Result Value Ref Range    WBC 6.2 4.1 - 11.1 K/uL    RBC 3.95 (L) 4.10 - 5.70 M/uL    HGB 12.8 12.1 - 17.0 g/dL    HCT 36.9 36.6 - 50.3 %    MCV 93.4 80.0 - 99.0 FL    MCH 32.4 26.0 - 34.0 PG    MCHC 34.7 30.0 - 36.5 g/dL    RDW 13.5 11.5 - 14.5 %    PLATELET 928 (L) 014 - 400 K/uL    MPV 11.6 8.9 - 12.9 FL    NRBC 0.0 0  WBC    ABSOLUTE NRBC 0.00 0.00 - 0.01 K/uL    NEUTROPHILS 72 32 - 75 %    LYMPHOCYTES 21 12 - 49 %    MONOCYTES 7 5 - 13 %    EOSINOPHILS 0 0 - 7 % BASOPHILS 0 0 - 1 %    IMMATURE GRANULOCYTES 0 0.0 - 0.5 %    ABS. NEUTROPHILS 4.4 1.8 - 8.0 K/UL    ABS. LYMPHOCYTES 1.3 0.8 - 3.5 K/UL    ABS. MONOCYTES 0.5 0.0 - 1.0 K/UL    ABS. EOSINOPHILS 0.0 0.0 - 0.4 K/UL    ABS. BASOPHILS 0.0 0.0 - 0.1 K/UL    ABS. IMM. GRANS. 0.0 0.00 - 0.04 K/UL    DF AUTOMATED     METABOLIC PANEL, COMPREHENSIVE    Collection Time: 05/30/19  1:42 AM   Result Value Ref Range    Sodium 141 136 - 145 mmol/L    Potassium 3.3 (L) 3.5 - 5.1 mmol/L    Chloride 114 (H) 97 - 108 mmol/L    CO2 20 (L) 21 - 32 mmol/L    Anion gap 7 5 - 15 mmol/L    Glucose 103 (H) 65 - 100 mg/dL    BUN 18 6 - 20 MG/DL    Creatinine 1.03 0.70 - 1.30 MG/DL    BUN/Creatinine ratio 17 12 - 20      GFR est AA >60 >60 ml/min/1.73m2    GFR est non-AA >60 >60 ml/min/1.73m2    Calcium 8.0 (L) 8.5 - 10.1 MG/DL    Bilirubin, total 0.6 0.2 - 1.0 MG/DL    ALT (SGPT) 28 12 - 78 U/L    AST (SGOT) 21 15 - 37 U/L    Alk. phosphatase 70 45 - 117 U/L    Protein, total 6.9 6.4 - 8.2 g/dL    Albumin 4.0 3.5 - 5.0 g/dL    Globulin 2.9 2.0 - 4.0 g/dL    A-G Ratio 1.4 1.1 - 2.2     SAMPLES BEING HELD    Collection Time: 05/30/19  1:42 AM   Result Value Ref Range    SAMPLES BEING HELD 1blue     COMMENT        Add-on orders for these samples will be processed based on acceptable specimen integrity and analyte stability, which may vary by analyte. Radiologic Studies -   XR CHEST SNGL V   Final Result   IMPRESSION: No acute findings. CT Results  (Last 48 hours)    None        CXR Results  (Last 48 hours)               05/30/19 0241  XR CHEST SNGL V Final result    Impression:  IMPRESSION: No acute findings. Narrative:  EXAM: XR CHEST SNGL V       INDICATION: chest pain       COMPARISON: Chest x-ray 3/10/2019. FINDINGS: A portable AP radiograph of the chest was obtained at 02:20 hours. The   patient is on a cardiac monitor.  Pacemaker generator body projects over the   right chest wall with intact appearing lead traversing in expected course. The   lungs are clear. The cardiac and mediastinal contours and pulmonary vascularity   are normal.  The bones and soft tissues are grossly within normal limits. Medical Decision Making   I am the first provider for this patient. I reviewed the vital signs, available nursing notes, past medical history, past surgical history, family history and social history. Vital Signs-Reviewed the patient's vital signs. Patient Vitals for the past 12 hrs:   Temp Pulse Resp BP SpO2   05/30/19 0140 98.7 °F (37.1 °C) 85 14 128/81 96 %   05/30/19 0134 98.7 °F (37.1 °C)           Records Reviewed: Nursing Notes and Old Medical Records    EKG interpretation: (Preliminary)0133  Rhythm: Atrial-paced rhythm with prolonged AV conduction; and regular . Rate (approx.): 84; Axis: normal; MI interval: normal; QRS interval: normal ; ST/T wave: normal; Other findings: RBBB same as prior. Provider Notes (Medical Decision Making):   Pt presenting with a multitude of symptoms including chest pain. Patient is well-known to the department. He is can review positive. Everything I ask he states he has including both diarrhea and constipation. Patient is a history of drug-seeking behavior and is opioid dependent. Because of his history however will get labs, chest x-ray, lipase. His EKG is the same as prior with old right bundle branch block and atrial paced rhythm. No signs of ischemic changes. Will try to avoid narcotics here    ED Course:   Initial assessment performed. The patients presenting problems have been discussed, and they are in agreement with the care plan formulated and outlined with them. I have encouraged them to ask questions as they arise throughout their visit. Disposition:  Discharge Note:  The patient has been re-evaluated and is ready for discharge. Reviewed available results with patient. Counseled patient on diagnosis and care plan.  Patient has expressed understanding, and all questions have been answered. Patient agrees with plan and agrees to follow up as recommended, or to return to the ED if their symptoms worsen. Discharge instructions have been provided and explained to the patient, along with reasons to return to the ED. PLAN:  1. Current Discharge Medication List        2. Follow-up Information     Follow up With Specialties Details Why Contact Info    pain management  Schedule an appointment as soon as possible for a visit          3. Return to ED if worse     Diagnosis     Clinical Impression:   1. Chronic chest pain    2. Uncomplicated opioid dependence ([facility]        Attestations:  Jil Pearson M.D. Please note that this dictation was completed with [employer], the computer voice recognition software. Quite often unanticipated grammatical, syntax, homophones, and other interpretive errors are inadvertently transcribed by the computer software. Please disregard these errors. Please excuse any errors that have escaped final proofreading. Thank you.

## 2020-05-28 ENCOUNTER — VIRTUAL VISIT (OUTPATIENT)
Dept: BEHAVIORAL/MENTAL HEALTH CLINIC | Age: 35
End: 2020-05-28

## 2020-05-28 DIAGNOSIS — G47.01 INSOMNIA DUE TO MEDICAL CONDITION: ICD-10-CM

## 2020-05-28 DIAGNOSIS — F06.4 ANXIETY DISORDER DUE TO MEDICAL CONDITION: Primary | ICD-10-CM

## 2020-05-28 DIAGNOSIS — F06.4 ANXIETY DISORDER DUE TO MEDICAL CONDITION: ICD-10-CM

## 2020-05-28 RX ORDER — LORAZEPAM 0.5 MG/1
0.5 TABLET ORAL
Qty: 30 TAB | Refills: 1 | Status: SHIPPED | OUTPATIENT
Start: 2020-05-28 | End: 2020-05-28 | Stop reason: SDUPTHER

## 2020-05-28 RX ORDER — FLUOXETINE HYDROCHLORIDE 20 MG/1
20 CAPSULE ORAL DAILY
Qty: 30 CAP | Refills: 3 | Status: SHIPPED | OUTPATIENT
Start: 2020-05-28

## 2020-05-28 RX ORDER — LISINOPRIL 20 MG/1
20 TABLET ORAL DAILY
COMMUNITY
Start: 2019-02-26

## 2020-05-28 RX ORDER — ZOLPIDEM TARTRATE 5 MG/1
5 TABLET ORAL
Qty: 30 TAB | Refills: 2 | Status: SHIPPED | OUTPATIENT
Start: 2020-05-28

## 2020-05-28 RX ORDER — FLUOXETINE HYDROCHLORIDE 20 MG/1
20 CAPSULE ORAL DAILY
Qty: 30 CAP | Refills: 3 | Status: SHIPPED | OUTPATIENT
Start: 2020-05-28 | End: 2020-05-28 | Stop reason: SDUPTHER

## 2020-05-28 RX ORDER — LORAZEPAM 0.5 MG/1
0.5 TABLET ORAL
Qty: 30 TAB | Refills: 1 | Status: SHIPPED | OUTPATIENT
Start: 2020-05-28

## 2020-05-28 RX ORDER — ZOLPIDEM TARTRATE 5 MG/1
5 TABLET ORAL
Qty: 30 TAB | Refills: 2 | Status: SHIPPED | OUTPATIENT
Start: 2020-05-28 | End: 2020-05-28 | Stop reason: SDUPTHER
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
## 2020-07-21 DIAGNOSIS — F06.4 ANXIETY DISORDER DUE TO MEDICAL CONDITION: ICD-10-CM

## 2020-07-21 RX ORDER — LORAZEPAM 0.5 MG/1
TABLET ORAL
Qty: 30 TAB | OUTPATIENT
Start: 2020-07-21

## 2021-03-08 ENCOUNTER — HOSPITAL ENCOUNTER (EMERGENCY)
Age: 36
Discharge: HOME | End: 2021-03-08
Attending: EMERGENCY MEDICINE
Payer: MEDICAID

## 2021-03-08 ENCOUNTER — APPOINTMENT (OUTPATIENT)
Dept: GENERAL RADIOLOGY | Age: 36
End: 2021-03-08
Attending: EMERGENCY MEDICINE
Payer: MEDICAID

## 2021-03-08 ENCOUNTER — APPOINTMENT (OUTPATIENT)
Dept: CT IMAGING | Age: 36
End: 2021-03-08
Attending: EMERGENCY MEDICINE
Payer: MEDICAID

## 2021-03-08 VITALS
TEMPERATURE: 97.4 F | HEART RATE: 82 BPM | OXYGEN SATURATION: 96 % | SYSTOLIC BLOOD PRESSURE: 149 MMHG | RESPIRATION RATE: 14 BRPM | DIASTOLIC BLOOD PRESSURE: 76 MMHG

## 2021-03-08 DIAGNOSIS — R41.82 ALTERED MENTAL STATUS, UNSPECIFIED ALTERED MENTAL STATUS TYPE: Primary | ICD-10-CM

## 2021-03-08 DIAGNOSIS — F19.10 SUBSTANCE ABUSE (HCC): ICD-10-CM

## 2021-03-08 DIAGNOSIS — S09.90XA INJURY OF HEAD, INITIAL ENCOUNTER: ICD-10-CM

## 2021-03-08 DIAGNOSIS — E86.0 DEHYDRATION: ICD-10-CM

## 2021-03-08 LAB
ALBUMIN SERPL-MCNC: 4.1 G/DL (ref 3.5–5)
ALBUMIN/GLOB SERPL: 1 {RATIO} (ref 1.1–2.2)
ALP SERPL-CCNC: 99 U/L (ref 45–117)
ALT SERPL-CCNC: 34 U/L (ref 12–78)
AMPHET UR QL SCN: NEGATIVE
ANION GAP SERPL CALC-SCNC: 8 MMOL/L (ref 5–15)
APAP SERPL-MCNC: <2 UG/ML (ref 10–30)
APPEARANCE UR: ABNORMAL
AST SERPL-CCNC: 29 U/L (ref 15–37)
ATRIAL RATE: 89 BPM
BACTERIA URNS QL MICRO: ABNORMAL /HPF
BARBITURATES UR QL SCN: NEGATIVE
BASOPHILS # BLD: 0 K/UL (ref 0–0.1)
BASOPHILS NFR BLD: 0 % (ref 0–1)
BENZODIAZ UR QL: POSITIVE
BILIRUB SERPL-MCNC: 0.6 MG/DL (ref 0.2–1)
BILIRUB UR QL: NEGATIVE
BNP SERPL-MCNC: 389 PG/ML
BUN SERPL-MCNC: 13 MG/DL (ref 6–20)
BUN/CREAT SERPL: 12 (ref 12–20)
CALCIUM SERPL-MCNC: 9.4 MG/DL (ref 8.5–10.1)
CALCULATED P AXIS, ECG09: 67 DEGREES
CALCULATED R AXIS, ECG10: -129 DEGREES
CALCULATED T AXIS, ECG11: 47 DEGREES
CANNABINOIDS UR QL SCN: POSITIVE
CHLORIDE SERPL-SCNC: 106 MMOL/L (ref 97–108)
CK MB CFR SERPL CALC: 1.4 % (ref 0–2.5)
CK MB SERPL-MCNC: 1.8 NG/ML (ref 5–25)
CK SERPL-CCNC: 133 U/L (ref 39–308)
CO2 SERPL-SCNC: 26 MMOL/L (ref 21–32)
COCAINE UR QL SCN: NEGATIVE
COLOR UR: ABNORMAL
COMMENT, HOLDF: NORMAL
CREAT SERPL-MCNC: 1.13 MG/DL (ref 0.7–1.3)
DIAGNOSIS, 93000: NORMAL
DIFFERENTIAL METHOD BLD: ABNORMAL
DRUG SCRN COMMENT,DRGCM: ABNORMAL
EOSINOPHIL # BLD: 0 K/UL (ref 0–0.4)
EOSINOPHIL NFR BLD: 0 % (ref 0–7)
EPITH CASTS URNS QL MICRO: ABNORMAL /LPF
ERYTHROCYTE [DISTWIDTH] IN BLOOD BY AUTOMATED COUNT: 12.6 % (ref 11.5–14.5)
ETHANOL SERPL-MCNC: <10 MG/DL
GLOBULIN SER CALC-MCNC: 4 G/DL (ref 2–4)
GLUCOSE BLD STRIP.AUTO-MCNC: 124 MG/DL (ref 65–100)
GLUCOSE SERPL-MCNC: 126 MG/DL (ref 65–100)
GLUCOSE UR STRIP.AUTO-MCNC: NEGATIVE MG/DL
HCT VFR BLD AUTO: 47.5 % (ref 36.6–50.3)
HGB BLD-MCNC: 15.9 G/DL (ref 12.1–17)
HGB UR QL STRIP: ABNORMAL
IMM GRANULOCYTES # BLD AUTO: 0 K/UL (ref 0–0.04)
IMM GRANULOCYTES NFR BLD AUTO: 0 % (ref 0–0.5)
KETONES UR QL STRIP.AUTO: ABNORMAL MG/DL
LACTATE BLD-SCNC: 2.02 MMOL/L (ref 0.4–2)
LEUKOCYTE ESTERASE UR QL STRIP.AUTO: NEGATIVE
LYMPHOCYTES # BLD: 1.2 K/UL (ref 0.8–3.5)
LYMPHOCYTES NFR BLD: 18 % (ref 12–49)
MCH RBC QN AUTO: 30.6 PG (ref 26–34)
MCHC RBC AUTO-ENTMCNC: 33.5 G/DL (ref 30–36.5)
MCV RBC AUTO: 91.3 FL (ref 80–99)
METHADONE UR QL: NEGATIVE
MONOCYTES # BLD: 0.6 K/UL (ref 0–1)
MONOCYTES NFR BLD: 8 % (ref 5–13)
NEUTS SEG # BLD: 5 K/UL (ref 1.8–8)
NEUTS SEG NFR BLD: 74 % (ref 32–75)
NITRITE UR QL STRIP.AUTO: POSITIVE
NRBC # BLD: 0 K/UL (ref 0–0.01)
NRBC BLD-RTO: 0 PER 100 WBC
OPIATES UR QL: NEGATIVE
P-R INTERVAL, ECG05: 194 MS
PCP UR QL: NEGATIVE
PH UR STRIP: 7.5 [PH] (ref 5–8)
PLATELET # BLD AUTO: 134 K/UL (ref 150–400)
PMV BLD AUTO: 11.9 FL (ref 8.9–12.9)
POTASSIUM SERPL-SCNC: 3.5 MMOL/L (ref 3.5–5.1)
PROT SERPL-MCNC: 8.1 G/DL (ref 6.4–8.2)
PROT UR STRIP-MCNC: NEGATIVE MG/DL
Q-T INTERVAL, ECG07: 422 MS
QRS DURATION, ECG06: 118 MS
QTC CALCULATION (BEZET), ECG08: 513 MS
RBC # BLD AUTO: 5.2 M/UL (ref 4.1–5.7)
RBC #/AREA URNS HPF: ABNORMAL /HPF (ref 0–5)
SALICYLATES SERPL-MCNC: <1.7 MG/DL (ref 2.8–20)
SAMPLES BEING HELD,HOLD: NORMAL
SERVICE CMNT-IMP: ABNORMAL
SODIUM SERPL-SCNC: 140 MMOL/L (ref 136–145)
SP GR UR REFRACTOMETRY: 1.02 (ref 1–1.03)
TROPONIN I SERPL-MCNC: <0.05 NG/ML
UA: UC IF INDICATED,UAUC: ABNORMAL
UROBILINOGEN UR QL STRIP.AUTO: 1 EU/DL (ref 0.2–1)
VENTRICULAR RATE, ECG03: 89 BPM
WBC # BLD AUTO: 6.9 K/UL (ref 4.1–11.1)
WBC URNS QL MICRO: ABNORMAL /HPF (ref 0–4)

## 2021-03-08 PROCEDURE — 74011250636 HC RX REV CODE- 250/636: Performed by: EMERGENCY MEDICINE

## 2021-03-08 PROCEDURE — 83880 ASSAY OF NATRIURETIC PEPTIDE: CPT

## 2021-03-08 PROCEDURE — 82962 GLUCOSE BLOOD TEST: CPT

## 2021-03-08 PROCEDURE — 84484 ASSAY OF TROPONIN QUANT: CPT

## 2021-03-08 PROCEDURE — 83605 ASSAY OF LACTIC ACID: CPT

## 2021-03-08 PROCEDURE — 80179 DRUG ASSAY SALICYLATE: CPT

## 2021-03-08 PROCEDURE — 80307 DRUG TEST PRSMV CHEM ANLYZR: CPT

## 2021-03-08 PROCEDURE — 82553 CREATINE MB FRACTION: CPT

## 2021-03-08 PROCEDURE — 71045 X-RAY EXAM CHEST 1 VIEW: CPT

## 2021-03-08 PROCEDURE — 85025 COMPLETE CBC W/AUTO DIFF WBC: CPT

## 2021-03-08 PROCEDURE — 99285 EMERGENCY DEPT VISIT HI MDM: CPT

## 2021-03-08 PROCEDURE — 96374 THER/PROPH/DIAG INJ IV PUSH: CPT

## 2021-03-08 PROCEDURE — 70450 CT HEAD/BRAIN W/O DYE: CPT

## 2021-03-08 PROCEDURE — 81001 URINALYSIS AUTO W/SCOPE: CPT

## 2021-03-08 PROCEDURE — 80053 COMPREHEN METABOLIC PANEL: CPT

## 2021-03-08 PROCEDURE — 82077 ASSAY SPEC XCP UR&BREATH IA: CPT

## 2021-03-08 PROCEDURE — 36415 COLL VENOUS BLD VENIPUNCTURE: CPT

## 2021-03-08 PROCEDURE — 80143 DRUG ASSAY ACETAMINOPHEN: CPT

## 2021-03-08 PROCEDURE — 93005 ELECTROCARDIOGRAM TRACING: CPT

## 2021-03-08 RX ORDER — SODIUM CHLORIDE 0.9 % (FLUSH) 0.9 %
5-40 SYRINGE (ML) INJECTION AS NEEDED
Status: DISCONTINUED | OUTPATIENT
Start: 2021-03-08 | End: 2021-03-08 | Stop reason: HOSPADM

## 2021-03-08 RX ORDER — LORAZEPAM 2 MG/ML
1 INJECTION INTRAMUSCULAR
Status: COMPLETED | OUTPATIENT
Start: 2021-03-08 | End: 2021-03-08

## 2021-03-08 RX ORDER — SODIUM CHLORIDE 0.9 % (FLUSH) 0.9 %
5-40 SYRINGE (ML) INJECTION EVERY 8 HOURS
Status: DISCONTINUED | OUTPATIENT
Start: 2021-03-08 | End: 2021-03-08 | Stop reason: HOSPADM

## 2021-03-08 RX ADMIN — LORAZEPAM 1 MG: 2 INJECTION INTRAMUSCULAR; INTRAVENOUS at 07:51

## 2021-03-08 NOTE — ED NOTES
Bedside and Verbal shift change report given to [facility] (oncoming nurse) by Thu Rothman (offgoing nurse). Report included the following information SBAR, ED Summary, MAR and Recent Results.
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
## 2021-03-08 NOTE — ED NOTES
Dr. Silvia Tam and Yen Cardenas RN reviewed discharge instructions with the patient. The patient verbalized understanding. All questions and concerns were addressed. The patient was taken out of the department in a wheelchair and is discharged ambulatory in the care of family members with instructions and prescriptions in hand. Pt is alert and oriented x 4. Respirations are clear and unlabored.

## 2021-03-08 NOTE — ED NOTES
This RN accompanied pt to CT. Patient had attempted to get out of the bed to use the bathroom unassisted. Pt was confused and agitated. Assisted to the bedside commode where he could not go. Bed alarm actively being used for patient.

## 2021-03-08 NOTE — ED NOTES
Pt not responding to verbal command, resisting staff. Nurse Lucía Dolan and [facility] checked rectal temp. Pt's temp is 94.4, Pt now on raulfaye desouza    0730: Pt trying to get out of bed, wants to use rest room, but he is risk for fall, bed side commode offer, Pt resisting staff. Pt's aunt in the room now, trying to reason with Pt.

## 2021-10-15 NOTE — PROGRESS NOTES
Chief Complaint   Patient presents with   [facility] Establish Care     Refill meds PAST MEDICAL HISTORY:  No pertinent past medical history

## 2022-02-05 ENCOUNTER — HOSPITAL ENCOUNTER (EMERGENCY)
Age: 37
Discharge: HOME | End: 2022-02-05
Attending: EMERGENCY MEDICINE
Payer: MEDICARE

## 2022-02-05 ENCOUNTER — APPOINTMENT (OUTPATIENT)
Dept: CT IMAGING | Age: 37
End: 2022-02-05
Attending: EMERGENCY MEDICINE
Payer: MEDICARE

## 2022-02-05 VITALS
DIASTOLIC BLOOD PRESSURE: 59 MMHG | TEMPERATURE: 98.2 F | RESPIRATION RATE: 14 BRPM | OXYGEN SATURATION: 99 % | SYSTOLIC BLOOD PRESSURE: 105 MMHG | HEART RATE: 85 BPM | HEIGHT: 67 IN | BODY MASS INDEX: 23.54 KG/M2 | WEIGHT: 150 LBS

## 2022-02-05 DIAGNOSIS — R07.89 CHEST WALL PAIN: Primary | ICD-10-CM

## 2022-02-05 LAB
ALBUMIN SERPL-MCNC: 3.7 G/DL (ref 3.5–5)
ALBUMIN/GLOB SERPL: 1.2 {RATIO} (ref 1.1–2.2)
ALP SERPL-CCNC: 80 U/L (ref 45–117)
ALT SERPL-CCNC: 35 U/L (ref 12–78)
ANION GAP SERPL CALC-SCNC: 6 MMOL/L (ref 5–15)
AST SERPL-CCNC: 30 U/L (ref 15–37)
ATRIAL RATE: 86 BPM
BASOPHILS # BLD: 0 K/UL (ref 0–0.1)
BASOPHILS NFR BLD: 1 % (ref 0–1)
BILIRUB SERPL-MCNC: 0.6 MG/DL (ref 0.2–1)
BUN SERPL-MCNC: 11 MG/DL (ref 6–20)
BUN/CREAT SERPL: 11 (ref 12–20)
CALCIUM SERPL-MCNC: 8.2 MG/DL (ref 8.5–10.1)
CALCULATED P AXIS, ECG09: 67 DEGREES
CALCULATED R AXIS, ECG10: 164 DEGREES
CALCULATED T AXIS, ECG11: 15 DEGREES
CHLORIDE SERPL-SCNC: 106 MMOL/L (ref 97–108)
CO2 SERPL-SCNC: 26 MMOL/L (ref 21–32)
CREAT SERPL-MCNC: 0.98 MG/DL (ref 0.7–1.3)
DIAGNOSIS, 93000: NORMAL
DIFFERENTIAL METHOD BLD: ABNORMAL
EOSINOPHIL # BLD: 0 K/UL (ref 0–0.4)
EOSINOPHIL NFR BLD: 1 % (ref 0–7)
ERYTHROCYTE [DISTWIDTH] IN BLOOD BY AUTOMATED COUNT: 13.2 % (ref 11.5–14.5)
GLOBULIN SER CALC-MCNC: 3.1 G/DL (ref 2–4)
GLUCOSE SERPL-MCNC: 84 MG/DL (ref 65–100)
HCT VFR BLD AUTO: 38.7 % (ref 36.6–50.3)
HGB BLD-MCNC: 13.5 G/DL (ref 12.1–17)
IMM GRANULOCYTES # BLD AUTO: 0 K/UL (ref 0–0.04)
IMM GRANULOCYTES NFR BLD AUTO: 0 % (ref 0–0.5)
LYMPHOCYTES # BLD: 1.2 K/UL (ref 0.8–3.5)
LYMPHOCYTES NFR BLD: 28 % (ref 12–49)
MCH RBC QN AUTO: 32.1 PG (ref 26–34)
MCHC RBC AUTO-ENTMCNC: 34.9 G/DL (ref 30–36.5)
MCV RBC AUTO: 92.1 FL (ref 80–99)
MONOCYTES # BLD: 0.5 K/UL (ref 0–1)
MONOCYTES NFR BLD: 11 % (ref 5–13)
NEUTS SEG # BLD: 2.7 K/UL (ref 1.8–8)
NEUTS SEG NFR BLD: 59 % (ref 32–75)
NRBC # BLD: 0 K/UL (ref 0–0.01)
NRBC BLD-RTO: 0 PER 100 WBC
P-R INTERVAL, ECG05: 186 MS
PLATELET # BLD AUTO: 109 K/UL (ref 150–400)
PMV BLD AUTO: 11.7 FL (ref 8.9–12.9)
POTASSIUM SERPL-SCNC: 3.2 MMOL/L (ref 3.5–5.1)
PROT SERPL-MCNC: 6.8 G/DL (ref 6.4–8.2)
Q-T INTERVAL, ECG07: 404 MS
QRS DURATION, ECG06: 108 MS
QTC CALCULATION (BEZET), ECG08: 483 MS
RBC # BLD AUTO: 4.2 M/UL (ref 4.1–5.7)
SARS-COV-2, COV2: NORMAL
SARS-COV-2, XPLCVT: NOT DETECTED
SODIUM SERPL-SCNC: 138 MMOL/L (ref 136–145)
SOURCE, COVRS: NORMAL
TROPONIN-HIGH SENSITIVITY: 31 NG/L (ref 0–76)
TROPONIN-HIGH SENSITIVITY: 36 NG/L (ref 0–76)
VENTRICULAR RATE, ECG03: 86 BPM
WBC # BLD AUTO: 4.5 K/UL (ref 4.1–11.1)

## 2022-02-05 PROCEDURE — 96374 THER/PROPH/DIAG INJ IV PUSH: CPT

## 2022-02-05 PROCEDURE — 71250 CT THORAX DX C-: CPT

## 2022-02-05 PROCEDURE — 99284 EMERGENCY DEPT VISIT MOD MDM: CPT

## 2022-02-05 PROCEDURE — U0005 INFEC AGEN DETEC AMPLI PROBE: HCPCS

## 2022-02-05 PROCEDURE — 36415 COLL VENOUS BLD VENIPUNCTURE: CPT

## 2022-02-05 PROCEDURE — 85025 COMPLETE CBC W/AUTO DIFF WBC: CPT

## 2022-02-05 PROCEDURE — 84484 ASSAY OF TROPONIN QUANT: CPT

## 2022-02-05 PROCEDURE — 80053 COMPREHEN METABOLIC PANEL: CPT

## 2022-02-05 PROCEDURE — 74011250636 HC RX REV CODE- 250/636: Performed by: EMERGENCY MEDICINE

## 2022-02-05 PROCEDURE — 93005 ELECTROCARDIOGRAM TRACING: CPT

## 2022-02-05 PROCEDURE — 74011250637 HC RX REV CODE- 250/637: Performed by: EMERGENCY MEDICINE

## 2022-02-05 RX ORDER — HYDROCODONE BITARTRATE AND ACETAMINOPHEN 5; 325 MG/1; MG/1
1 TABLET ORAL
Status: COMPLETED | OUTPATIENT
Start: 2022-02-05 | End: 2022-02-05

## 2022-02-05 RX ORDER — ONDANSETRON 2 MG/ML
4 INJECTION INTRAMUSCULAR; INTRAVENOUS
Status: COMPLETED | OUTPATIENT
Start: 2022-02-05 | End: 2022-02-05

## 2022-02-05 RX ADMIN — HYDROCODONE BITARTRATE AND ACETAMINOPHEN 1 TABLET: 5; 325 TABLET ORAL at 09:22

## 2022-02-05 RX ADMIN — ONDANSETRON 4 MG: 2 INJECTION INTRAMUSCULAR; INTRAVENOUS at 07:35

## 2022-02-05 RX ADMIN — HYDROCODONE BITARTRATE AND ACETAMINOPHEN 1 TABLET: 5; 325 TABLET ORAL at 07:04

## 2022-02-05 NOTE — ED NOTES
Patient discharged home. Patient received discharge paperwork and follow up instructions. All questions were answered.

## 2022-02-05 NOTE — ED PROVIDER NOTES
EMERGENCY DEPARTMENT HISTORY AND PHYSICAL EXAM      Date: 2/5/2022  Patient Name: Summer Smith    History of Presenting Illness     Chief Complaint   Patient presents with    Fall     Pt to ED by EMS c/o fall. Pt was walking down the steps when he started to feel SOB and then he had a syncopal episode. Reports falling down about 4 or 5 steps. Pt reports hx of heart problems & has a pacemaker. Pt has been having CP since the fall. Denies any other injuries. HPI: Summer Smith, 39 y.o. male with history of cardiac disease significant for transposition of great vessels status post repair, congestive heart failure, reported valve abnormalities, presenting to ED after fall. He reports having a syncopal episode and falling forward, hitting his chest on something. He now has pain at the site of impact. He did not have pain before hand. He notes having worsening shortness of breath over the last couple months. He reports being seen at [facility] for this, told his valve is leaking. He reports that considerations for this may be heart transplant versus valve repair. There are no other complaints, changes, or physical findings at this time. PCP: Gopal Bradley NP    No current facility-administered medications on file prior to encounter. Current Outpatient Medications on File Prior to Encounter   Medication Sig Dispense Refill    lisinopriL (PRINIVIL, ZESTRIL) 20 mg tablet Take 20 mg by mouth daily.  LORazepam (ATIVAN) 0.5 mg tablet Take 1 Tab by mouth daily as needed for Anxiety. 30 Tab 1    FLUoxetine (PROzac) 20 mg capsule Take 1 Cap by mouth daily. 30 Cap 3    zolpidem (AMBIEN) 5 mg tablet Take 1 Tab by mouth nightly as needed for Sleep. Max Daily Amount: 5 mg. 30 Tab 2    metoprolol succinate (TOPROL-XL) 50 mg XL tablet Take 1 Tab by mouth daily. 90 Tab 0    spironolactone (ALDACTONE) 25 mg tablet Take 1 Tab by mouth daily.  30 Tab 1       Past History     Past Medical History:  Past Medical History:   Diagnosis Date    Asthma     CAD (coronary artery disease)     transposition of cardiac arteries    Calculus of kidney     Dizziness 3/11/2019    Gout     Heart failure ([facility] [address]     with pacemaker, states transition of great vessels    Opioid abuse, daily use ([facility] [address] 3/12/2019    Pacemaker     Stroke ([facility] [address]     x2 in 2019 with right sided weakness.  Syncope     Transposition great arteries        Past Surgical History:  Past Surgical History:   Procedure Laterality Date    CARDIAC SURG PROCEDURE UNLIST      in 195 for transposition of the cardiac arteries    HX PACEMAKER         Family History:  No family history on file. Social History:  Social History     Tobacco Use    Smoking status: Never Smoker    Smokeless tobacco: Never Used    Tobacco comment: advised not to start   Substance Use Topics    Alcohol use: No    Drug use: Yes     Types: Marijuana       Allergies: Allergies   Allergen Reactions    Betadine [Povidone-Iodine] Rash    Contrast Agent [Iodine] Itching     Need to pre-medicate with benadryl         Review of Systems   Review of Systems   Constitutional: Negative for chills and fever. HENT: Negative for sore throat. Eyes: Negative for redness. Respiratory: Positive for shortness of breath. Cardiovascular: Positive for chest pain. Gastrointestinal: Negative for abdominal pain. Genitourinary: Negative for dysuria. Musculoskeletal: Negative for back pain. Neurological: Positive for syncope. Psychiatric/Behavioral: The patient is not nervous/anxious. All other systems reviewed and are negative. Physical Exam   Physical Exam  Vitals and nursing note reviewed. Constitutional:       Appearance: Normal appearance. HENT:      Head: Normocephalic and atraumatic. Mouth/Throat:      Mouth: Mucous membranes are moist.   Cardiovascular:      Rate and Rhythm: Normal rate and regular rhythm.    Pulmonary:      Effort: Pulmonary effort is normal.      Breath sounds: Normal breath sounds. Comments: TTP to ant chest wall w/o deformity  Abdominal:      Palpations: Abdomen is soft. Tenderness: There is no abdominal tenderness. Musculoskeletal:         General: No deformity. Cervical back: Neck supple. Skin:     General: Skin is warm and dry. Neurological:      General: No focal deficit present. Mental Status: He is alert. Psychiatric:         Mood and Affect: Mood normal.         Behavior: Behavior normal.         Diagnostic Study Results     Labs -         Radiologic Studies -   CT CHEST WO CONT   Final Result   Transposition of the great arteries. Pulmonary arterial enlargement. No evidence for acute chest trauma. Nonobstructing bilateral renal calculi        CT Results  (Last 48 hours)               02/05/22 0759  CT CHEST WO CONT Final result    Impression:  Transposition of the great arteries. Pulmonary arterial enlargement. No evidence for acute chest trauma. Nonobstructing bilateral renal calculi       Narrative:  INDICATION: Fall with anterior wall intact. COMPARISON: 3/8/2021 chest radiograph. 2017 CTA. CONTRAST: None. TECHNIQUE:  5 mm axial images were obtained through the chest. Coronal and   sagittal reformats were generated. CT dose reduction was achieved through use   of a standardized protocol tailored for this examination and automatic exposure   control for dose modulation. The absence of intravenous contrast reduces the sensitivity for evaluation of   the mediastinum, faiza, vasculature, and upper abdominal organs. FINDINGS:       CHEST WALL: No mass or axillary lymphadenopathy. THYROID: No nodule. MEDIASTINUM: No mass or lymphadenopathy. FAIZA: No mass or lymphadenopathy. THORACIC AORTA: No aneurysm. Ascending aorta originates anterior to the main   pulmonary artery from the right ventricle. [place] MAIN PULMONARY ARTERY: Axial caliber of 4.7 cm. TRACHEA/BRONCHI: Patent. ESOPHAGUS: No wall thickening or dilatation. HEART: Pacemaker is present. PLEURA: No effusion or pneumothorax. LUNGS: No nodule, mass, or airspace disease. INCIDENTALLY IMAGED UPPER ABDOMEN: Nonobstructing bilateral renal calculi. BONES: No destructive bone lesion. Breast asymmetry is seen. CXR Results  (Last 48 hours)    None            Medical Decision Making   I am the first provider for this patient. I reviewed the vital signs, available nursing notes, past medical history, past surgical history, family history and social history. Vital Signs-Reviewed the patient's vital signs. Patient Vitals for the past 24 hrs:   Temp Pulse Resp BP SpO2   02/05/22 0940  85 14 (!) 105/59 99 %   02/05/22 0925  85 14 (!) 102/57 100 %   02/05/22 0910  85 10 (!) 102/58 95 %   02/05/22 0855  85 15 (!) 104/59 95 %   02/05/22 0840  85 16 (!) 102/59 95 %   02/05/22 0718  85 15 111/75 98 %   02/05/22 0700  91 24 111/72 96 %   02/05/22 0645  87 21 101/69 94 %   02/05/22 0640 98.2 °F (36.8 °C) 90 16 119/73 98 %         Provider Notes (Medical Decision Making):   72-year-old with cardiac history presenting to ED after fall. Reports an episode of syncope but unclear circumstances for this, never had chest pain or shortness of breath suggestive of PE, has a normal pacemaker interrogation in the emergency department. He has no significant cardiac murmur though he reports some regurgitation and possible ongoing management for his known anatomy abnormalities, does not sound like acute worsening at this time. His main complaint is pain to his anterior chest wall associated with impact. CT imaging done and demonstrates no acute abnormalities. His troponins are stable and his EKG has no changes, does not sound like ACS. Ultimately, I think patient is safe for discharge at this time with plan to follow-up with his physician for further evaluation.   Return precautions given.  EKG sinus, rate 86, T wave inversions, paced, unchanged from previous    ED Course:     Initial assessment performed. The patients presenting problems have been discussed, and they are in agreement with the care plan formulated and outlined with them. I have encouraged them to ask questions as they arise throughout their visit. Disposition:  dc    PLAN:  1. Discharge Medication List as of 2/5/2022  9:53 AM        2.    Follow-up Information    None       Return to ED if worse     Diagnosis     Clinical Impression: chest wall pain

## 2022-02-05 NOTE — ED NOTES
Pt to ED by EMS c/o fall. Pt was walking down the steps when he started to feel SOB and then he had a syncopal episode. Reports falling down about 4 or 5 steps. Pt reports hx of heart problems & has a pacemaker. Pt has been having CP since the fall. Denies any other injuries. Pt A&Ox4 (person, place, time, and situation). Respirations even and unlabored. Skin warm, dry, and appropriate for ethnicity. PMS intact. Pt on continuous monitor. VSS. NAD noted. Stretcher in low and locked position. Denies having any further needs at this time. Call light within reach.

## 2022-08-24 NOTE — ED NOTES
Assumed care of patient. Patient postitioned for comfort, resting quietly in stretcher with call bell in reach. The patient is Stable - Low risk of patient condition declining or worsening    Shift Goals  Clinical Goals: wean O2, pulmonary hygiene, pain control  Patient Goals: pain control, rest, go home  Family Goals: no family present    Progress made toward(s) clinical / shift goals:  wean O2, pulmonary hygiene, pain control     Patient is not progressing towards the following goals: n/a      Problem: Pain - Standard  Goal: Alleviation of pain or a reduction in pain to the patient’s comfort goal  Outcome: Progressing     Problem: Neurovascular Monitoring  Goal: Patient's neurovascular status will be maintained or improve  Outcome: Progressing     Problem: Early Mobilization - Post Surgery  Goal: Early mobilization post surgery  Outcome: Progressing     Problem: Bowel Elimination - Post Surgical  Goal: Patient will resume regular bowel sounds and function with no discomfort or distention  Outcome: Progressing

## 2022-09-06 NOTE — LETTER
Pt called and stated that the pharmacy received a script for Trelegy. Pt does not want to take this medication. She wants to take Advair and would like to have a script for Advair sent to her pharmacy.     [facility] [address] 
[facility] EMERGENCY DEPT 
[address] [place] [address]. [address] [id number] [phone] Work/School Note Date: 8/6/2018 To Whom It May concern: 
 
Dee Palma was seen and treated today in the emergency room by the following provider(s): 
Attending Provider: Jose Preciado MD. Dee Palma may return to work on 8/8/18.  
 
Sincerely, 
 
 
 
 
Jose Preciado MD

## 2022-11-04 ENCOUNTER — APPOINTMENT (OUTPATIENT)
Dept: GENERAL RADIOLOGY | Age: 37
End: 2022-11-04
Attending: EMERGENCY MEDICINE
Payer: MEDICARE

## 2022-11-04 ENCOUNTER — HOSPITAL ENCOUNTER (EMERGENCY)
Age: 37
Discharge: HOME | End: 2022-11-04
Attending: EMERGENCY MEDICINE
Payer: MEDICARE

## 2022-11-04 VITALS
OXYGEN SATURATION: 97 % | HEIGHT: 67 IN | SYSTOLIC BLOOD PRESSURE: 105 MMHG | RESPIRATION RATE: 21 BRPM | BODY MASS INDEX: 24.33 KG/M2 | DIASTOLIC BLOOD PRESSURE: 83 MMHG | HEART RATE: 90 BPM | TEMPERATURE: 98 F | WEIGHT: 155 LBS

## 2022-11-04 DIAGNOSIS — R07.9 CHEST PAIN, UNSPECIFIED TYPE: Primary | ICD-10-CM

## 2022-11-04 LAB
ALBUMIN SERPL-MCNC: 4.1 G/DL (ref 3.5–5)
ALBUMIN/GLOB SERPL: 1.3 {RATIO} (ref 1.1–2.2)
ALP SERPL-CCNC: 89 U/L (ref 45–117)
ALT SERPL-CCNC: 37 U/L (ref 12–78)
AMPHET UR QL SCN: POSITIVE
ANION GAP SERPL CALC-SCNC: 9 MMOL/L (ref 5–15)
AST SERPL-CCNC: 29 U/L (ref 15–37)
ATRIAL RATE: 84 BPM
BARBITURATES UR QL SCN: NEGATIVE
BASOPHILS # BLD: 0 K/UL (ref 0–0.1)
BASOPHILS NFR BLD: 1 % (ref 0–1)
BENZODIAZ UR QL: NEGATIVE
BILIRUB SERPL-MCNC: 0.6 MG/DL (ref 0.2–1)
BUN SERPL-MCNC: 14 MG/DL (ref 6–20)
BUN/CREAT SERPL: 13 (ref 12–20)
CALCIUM SERPL-MCNC: 9.1 MG/DL (ref 8.5–10.1)
CALCULATED P AXIS, ECG09: 112 DEGREES
CALCULATED R AXIS, ECG10: 151 DEGREES
CALCULATED T AXIS, ECG11: 25 DEGREES
CANNABINOIDS UR QL SCN: POSITIVE
CHLORIDE SERPL-SCNC: 108 MMOL/L (ref 97–108)
CO2 SERPL-SCNC: 22 MMOL/L (ref 21–32)
COCAINE UR QL SCN: NEGATIVE
CREAT SERPL-MCNC: 1.07 MG/DL (ref 0.7–1.3)
D DIMER PPP FEU-MCNC: 0.36 MG/L FEU (ref 0–0.65)
DIAGNOSIS, 93000: NORMAL
DIFFERENTIAL METHOD BLD: NORMAL
DRUG SCRN COMMENT,DRGCM: ABNORMAL
EOSINOPHIL # BLD: 0.1 K/UL (ref 0–0.4)
EOSINOPHIL NFR BLD: 2 % (ref 0–7)
ERYTHROCYTE [DISTWIDTH] IN BLOOD BY AUTOMATED COUNT: 13 % (ref 11.5–14.5)
GLOBULIN SER CALC-MCNC: 3.2 G/DL (ref 2–4)
GLUCOSE SERPL-MCNC: 87 MG/DL (ref 65–100)
HCT VFR BLD AUTO: 39.9 % (ref 36.6–50.3)
HGB BLD-MCNC: 13.5 G/DL (ref 12.1–17)
IMM GRANULOCYTES # BLD AUTO: 0 K/UL (ref 0–0.04)
IMM GRANULOCYTES NFR BLD AUTO: 0 % (ref 0–0.5)
LIPASE SERPL-CCNC: 192 U/L (ref 73–393)
LYMPHOCYTES # BLD: 1.2 K/UL (ref 0.8–3.5)
LYMPHOCYTES NFR BLD: 23 % (ref 12–49)
MCH RBC QN AUTO: 30.8 PG (ref 26–34)
MCHC RBC AUTO-ENTMCNC: 33.8 G/DL (ref 30–36.5)
MCV RBC AUTO: 91.1 FL (ref 80–99)
METHADONE UR QL: NEGATIVE
MONOCYTES # BLD: 0.4 K/UL (ref 0–1)
MONOCYTES NFR BLD: 7 % (ref 5–13)
NEUTS SEG # BLD: 3.5 K/UL (ref 1.8–8)
NEUTS SEG NFR BLD: 67 % (ref 32–75)
NRBC # BLD: 0 K/UL (ref 0–0.01)
NRBC BLD-RTO: 0 PER 100 WBC
OPIATES UR QL: NEGATIVE
P-R INTERVAL, ECG05: 230 MS
PCP UR QL: NEGATIVE
PLATELET # BLD AUTO: 156 K/UL (ref 150–400)
PMV BLD AUTO: 11.7 FL (ref 8.9–12.9)
POTASSIUM SERPL-SCNC: 3.8 MMOL/L (ref 3.5–5.1)
PROT SERPL-MCNC: 7.3 G/DL (ref 6.4–8.2)
Q-T INTERVAL, ECG07: 402 MS
QRS DURATION, ECG06: 114 MS
QTC CALCULATION (BEZET), ECG08: 475 MS
RBC # BLD AUTO: 4.38 M/UL (ref 4.1–5.7)
SODIUM SERPL-SCNC: 139 MMOL/L (ref 136–145)
TROPONIN-HIGH SENSITIVITY: 20 NG/L (ref 0–76)
TROPONIN-HIGH SENSITIVITY: 20 NG/L (ref 0–76)
VENTRICULAR RATE, ECG03: 84 BPM
WBC # BLD AUTO: 5.2 K/UL (ref 4.1–11.1)

## 2022-11-04 PROCEDURE — 85379 FIBRIN DEGRADATION QUANT: CPT

## 2022-11-04 PROCEDURE — 85025 COMPLETE CBC W/AUTO DIFF WBC: CPT

## 2022-11-04 PROCEDURE — 99284 EMERGENCY DEPT VISIT MOD MDM: CPT

## 2022-11-04 PROCEDURE — 93005 ELECTROCARDIOGRAM TRACING: CPT

## 2022-11-04 PROCEDURE — 96374 THER/PROPH/DIAG INJ IV PUSH: CPT

## 2022-11-04 PROCEDURE — 96375 TX/PRO/DX INJ NEW DRUG ADDON: CPT

## 2022-11-04 PROCEDURE — 83690 ASSAY OF LIPASE: CPT

## 2022-11-04 PROCEDURE — 74011250636 HC RX REV CODE- 250/636: Performed by: EMERGENCY MEDICINE

## 2022-11-04 PROCEDURE — 80307 DRUG TEST PRSMV CHEM ANLYZR: CPT

## 2022-11-04 PROCEDURE — 80053 COMPREHEN METABOLIC PANEL: CPT

## 2022-11-04 PROCEDURE — 36415 COLL VENOUS BLD VENIPUNCTURE: CPT

## 2022-11-04 PROCEDURE — 84484 ASSAY OF TROPONIN QUANT: CPT

## 2022-11-04 RX ORDER — KETOROLAC TROMETHAMINE 30 MG/ML
30 INJECTION, SOLUTION INTRAMUSCULAR; INTRAVENOUS
Status: DISCONTINUED | OUTPATIENT
Start: 2022-11-04 | End: 2022-11-04

## 2022-11-04 RX ORDER — IBUPROFEN 600 MG/1
600 TABLET ORAL
Qty: 20 TABLET | Refills: 0 | Status: SHIPPED | OUTPATIENT
Start: 2022-11-04

## 2022-11-04 RX ORDER — DEXTROAMPHETAMINE SACCHARATE, AMPHETAMINE ASPARTATE MONOHYDRATE, DEXTROAMPHETAMINE SULFATE AND AMPHETAMINE SULFATE 5; 5; 5; 5 MG/1; MG/1; MG/1; MG/1
20 CAPSULE, EXTENDED RELEASE ORAL
COMMUNITY

## 2022-11-04 RX ORDER — GABAPENTIN 100 MG/1
100 CAPSULE ORAL 3 TIMES DAILY
COMMUNITY

## 2022-11-04 RX ORDER — DIAZEPAM 5 MG/1
5 TABLET ORAL
Status: DISCONTINUED | OUTPATIENT
Start: 2022-11-04 | End: 2022-11-04

## 2022-11-04 RX ORDER — HYDROMORPHONE HYDROCHLORIDE 1 MG/ML
1 INJECTION, SOLUTION INTRAMUSCULAR; INTRAVENOUS; SUBCUTANEOUS
Status: COMPLETED | OUTPATIENT
Start: 2022-11-04 | End: 2022-11-04

## 2022-11-04 RX ORDER — DULOXETIN HYDROCHLORIDE 20 MG/1
20 CAPSULE, DELAYED RELEASE ORAL DAILY
COMMUNITY

## 2022-11-04 RX ORDER — ONDANSETRON 2 MG/ML
4 INJECTION INTRAMUSCULAR; INTRAVENOUS
Status: COMPLETED | OUTPATIENT
Start: 2022-11-04 | End: 2022-11-04

## 2022-11-04 RX ADMIN — HYDROMORPHONE HYDROCHLORIDE 1 MG: 1 INJECTION, SOLUTION INTRAMUSCULAR; INTRAVENOUS; SUBCUTANEOUS at 01:00

## 2022-11-04 RX ADMIN — ONDANSETRON 4 MG: 2 INJECTION INTRAMUSCULAR; INTRAVENOUS at 01:00
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
## 2022-11-04 NOTE — ED NOTES
Patient wants to leave right now. Does not want to wait for transport. Says he will walk. Dr ordoñez--  Repeat troponin drawn before IV removed  Patient plans to walk home if transportation can not be arranged quickly.

## 2022-11-04 NOTE — ED NOTES
Patient in the room--  No distress  Has taken off his gown  Climbed out of bed to get the phone  Trying to reach someone--  Asked for his contact numbers  Only number seen by this nurse was father--  Heidi Thorpe him his father's number--

## 2022-12-02 NOTE — ROUTINE PROCESS
Bedside shift change report given to [place] - CHAZ RN (oncoming nurse) by Moon Bland nurse). Report included the following information SBAR.     Zone Phone:   [phone]        Significant changes during shift:   none                                Patient Information     Cristian Due  35 y.o.  3/10/2019 11:14 AM by Anum Garcia MD. Umberto Garcia was admitted from Home     Problem List             Patient Active Problem List     Diagnosis Date Noted    Dizziness 03/11/2019    TIA (transient ischemic attack) 03/10/2019    Chronic chest pain 10/11/2016    Transposition great arteries 10/11/2016    Right ventricular failure ([address] 10/11/2016    Wheezing 10/11/2016    History of DVT (deep vein thrombosis) 10/11/2016    H/O Mustard procedure 10/11/2016    History of CVA (cerebrovascular accident) 10/11/2016    Pacemaker 10/11/2016    Chronic pain 10/11/2016              Past Medical History:   Diagnosis Date    Asthma      CAD (coronary artery disease)      Calculus of kidney      Dizziness 3/11/2019    Gout      Heart failure ([address]       with pacemaker, states transition of great vessels    Pacemaker      Stroke ([facility] [address]      Syncope      Transposition great arteries              Core Measures:     CVA: Yes Yes  CHF:No No  PNA:No No     Activity Status:     OOB to Chair No  Ambulated this shift Yes   Bed Rest No     Supplemental Z9: (QK Applicable)     NC No  NRB No  Venti-mask No  Room air     LINES AND DRAINS:     PIV     DVT prophylaxis:     DVT prophylaxis Med- Yes - refused  DVT prophylaxis SCD or FRED- No      Wounds: (If Applicable)     Wounds- No     Location      Patient Safety:     Falls Score Total Score: 3  Safety Level_______  Bed Alarm On? No  Sitter?  No     Plan for upcoming shift: s          Discharge Plan: Yes TBD     Active Consults:  IP CONSULT TO NEUROLOGY  IP CONSULT TO NEUROLOGY  IP CONSULT TO HOSPITALIST  IP CONSULT TO CARDIOLOGY                          Dr. Davidson - please advise on refill request.

## 2024-05-29 NOTE — PROGRESS NOTES
Bedside and Verbal shift change report given to Ann Marie Venegas (oncoming nurse) by Christopher Cobb (offgoing nurse). Report included the following information SBAR, Kardex and Recent Results. Zone Phone:   [phone]      Significant changes during shift:  Pain medication given, not much relief        Patient Information    Ascencion Sandoval  35 y.o.  3/10/2019 11:14 AM by Stacey Castro MD. Ascencion Sandoval was admitted from Home    Problem List    Patient Active Problem List    Diagnosis Date Noted    TIA (transient ischemic attack) 03/10/2019    Chest pain 10/11/2016    Transposition great arteries 10/11/2016    Right ventricular failure ([address] 10/11/2016    Wheezing 10/11/2016    History of DVT (deep vein thrombosis) 10/11/2016    H/O Mustard procedure 10/11/2016    History of CVA (cerebrovascular accident) 10/11/2016    Pacemaker 10/11/2016    Chronic pain 10/11/2016     Past Medical History:   Diagnosis Date    Asthma     CAD (coronary artery disease)     Calculus of kidney     Gout     Heart failure ([address]     with pacemaker, states transition of great vessels    Pacemaker     Stroke ([address]     Syncope     Transposition great arteries          Core Measures:    CVA: Yes Yes  CHF:No No  PNA:No No      Activity Status:    OOB to Chair Yes  Ambulated this shift Yes   Bed Rest No    Supplemental O2: (If Applicable)    NC No  NRB No  Venti-mask No  On room air      LINES AND DRAINS:    Central Line? No     PICC LINE? No    Urinary Catheter? No     DVT prophylaxis:    DVT prophylaxis Med- Yes  DVT prophylaxis SCD or FRED- No     Wounds: (If Applicable)    Wounds- No    Location     Patient Safety:    Falls Score Total Score: 3  Safety Level_______  Bed Alarm On? No  Sitter?  No    Plan for upcoming shift: echo, carotid, MRI (able with pacemaker), neuro consult, cardiology consult        Discharge Plan: No TBD    Active Consults:  IP CONSULT TO NEUROLOGY  IP CONSULT TO NEUROLOGY  IP CONSULT TO HOSPITALIST  IP CONSULT TO CARDIOLOGY Performed

## 2025-03-31 NOTE — PROGRESS NOTES
Discharge instructions including medications. follow up appointments  and instructions to follow up with [facility] as outpatient reviewed with patient . He verbalized understanding. Discharged via wheelchair by volunteer with grandmother. 140